# Patient Record
Sex: FEMALE | Race: WHITE | Employment: OTHER | URBAN - METROPOLITAN AREA
[De-identification: names, ages, dates, MRNs, and addresses within clinical notes are randomized per-mention and may not be internally consistent; named-entity substitution may affect disease eponyms.]

---

## 2017-01-01 PROBLEM — D72.819 LEUKOPENIA: Status: ACTIVE | Noted: 2017-01-01

## 2017-01-01 PROBLEM — R55 SYNCOPE: Status: ACTIVE | Noted: 2017-01-01

## 2017-01-02 PROBLEM — D63.8 ANEMIA OF CHRONIC DISEASE: Chronic | Status: ACTIVE | Noted: 2017-01-02

## 2017-01-02 PROBLEM — E78.2 MIXED HYPERLIPIDEMIA: Chronic | Status: ACTIVE | Noted: 2017-01-02

## 2018-04-04 ENCOUNTER — HOSPITAL ENCOUNTER (OUTPATIENT)
Dept: GENERAL RADIOLOGY | Age: 68
Discharge: HOME | End: 2018-04-06
Payer: MEDICARE

## 2018-04-04 ENCOUNTER — HOSPITAL ENCOUNTER (OUTPATIENT)
Dept: NUCLEAR MEDICINE | Age: 68
Discharge: HOME | End: 2018-04-04
Payer: MEDICARE

## 2018-04-04 ENCOUNTER — HOSPITAL ENCOUNTER (OUTPATIENT)
Age: 68
Discharge: HOME | End: 2018-04-06
Payer: MEDICARE

## 2018-04-04 DIAGNOSIS — N20.0 CALCULUS OF KIDNEY: ICD-10-CM

## 2018-04-04 PROCEDURE — 6360000002 HC RX W HCPCS: Performed by: UROLOGY

## 2018-04-04 PROCEDURE — 3430000000 HC RX DIAGNOSTIC RADIOPHARMACEUTICAL: Performed by: RADIOLOGY

## 2018-04-04 PROCEDURE — A9562 TC99M MERTIATIDE: HCPCS | Performed by: RADIOLOGY

## 2018-04-04 PROCEDURE — 78708 K FLOW/FUNCT IMAGE W/DRUG: CPT

## 2018-04-04 PROCEDURE — 74018 RADEX ABDOMEN 1 VIEW: CPT

## 2018-04-04 RX ORDER — FUROSEMIDE 10 MG/ML
10 INJECTION INTRAMUSCULAR; INTRAVENOUS ONCE
Status: COMPLETED | OUTPATIENT
Start: 2018-04-04 | End: 2018-04-04

## 2018-04-04 RX ADMIN — FUROSEMIDE 10 MG: 10 INJECTION, SOLUTION INTRAVENOUS at 12:02

## 2018-04-04 RX ADMIN — Medication 10 MILLICURIE: at 11:16

## 2018-04-14 ENCOUNTER — PREP FOR PROCEDURE (OUTPATIENT)
Dept: UROLOGY | Age: 68
End: 2018-04-14

## 2018-04-14 RX ORDER — SODIUM CHLORIDE 0.9 % (FLUSH) 0.9 %
10 SYRINGE (ML) INJECTION PRN
Status: CANCELLED | OUTPATIENT
Start: 2018-04-14

## 2018-04-14 RX ORDER — SODIUM CHLORIDE, SODIUM LACTATE, POTASSIUM CHLORIDE, CALCIUM CHLORIDE 600; 310; 30; 20 MG/100ML; MG/100ML; MG/100ML; MG/100ML
INJECTION, SOLUTION INTRAVENOUS CONTINUOUS
Status: CANCELLED | OUTPATIENT
Start: 2018-04-14

## 2018-04-14 RX ORDER — SODIUM CHLORIDE 0.9 % (FLUSH) 0.9 %
10 SYRINGE (ML) INJECTION EVERY 12 HOURS SCHEDULED
Status: CANCELLED | OUTPATIENT
Start: 2018-04-14

## 2018-04-19 ENCOUNTER — HOSPITAL ENCOUNTER (OUTPATIENT)
Age: 68
Setting detail: OUTPATIENT SURGERY
Discharge: HOME | End: 2018-04-19
Attending: UROLOGY | Admitting: UROLOGY
Payer: MEDICARE

## 2018-04-19 ENCOUNTER — ANESTHESIA EVENT (OUTPATIENT)
Dept: OPERATING ROOM | Age: 68
End: 2018-04-19
Payer: MEDICARE

## 2018-04-19 ENCOUNTER — ANESTHESIA (OUTPATIENT)
Dept: OPERATING ROOM | Age: 68
End: 2018-04-19
Payer: MEDICARE

## 2018-04-19 ENCOUNTER — APPOINTMENT (OUTPATIENT)
Dept: GENERAL RADIOLOGY | Age: 68
End: 2018-04-19
Attending: UROLOGY
Payer: MEDICARE

## 2018-04-19 VITALS
SYSTOLIC BLOOD PRESSURE: 163 MMHG | WEIGHT: 158 LBS | TEMPERATURE: 98.6 F | DIASTOLIC BLOOD PRESSURE: 72 MMHG | RESPIRATION RATE: 16 BRPM | HEART RATE: 88 BPM | BODY MASS INDEX: 29.83 KG/M2 | HEIGHT: 61 IN | OXYGEN SATURATION: 98 %

## 2018-04-19 VITALS — OXYGEN SATURATION: 99 % | SYSTOLIC BLOOD PRESSURE: 90 MMHG | DIASTOLIC BLOOD PRESSURE: 43 MMHG

## 2018-04-19 DIAGNOSIS — N20.0 KIDNEY STONE: ICD-10-CM

## 2018-04-19 LAB
HCT VFR BLD CALC: 32.6 % (ref 34–48)
HEMOGLOBIN: 11 G/DL (ref 11.5–15.5)
MCH RBC QN AUTO: 32.9 PG (ref 26–35)
MCHC RBC AUTO-ENTMCNC: 33.7 % (ref 32–34.5)
MCV RBC AUTO: 97.6 FL (ref 80–99.9)
PDW BLD-RTO: 14 FL (ref 11.5–15)
PLATELET # BLD: 129 E9/L (ref 130–450)
PMV BLD AUTO: 9.5 FL (ref 7–12)
RBC # BLD: 3.34 E12/L (ref 3.5–5.5)
WBC # BLD: 3.8 E9/L (ref 4.5–11.5)

## 2018-04-19 PROCEDURE — 74420 UROGRAPHY RTRGR +-KUB: CPT

## 2018-04-19 PROCEDURE — 87088 URINE BACTERIA CULTURE: CPT

## 2018-04-19 PROCEDURE — 87077 CULTURE AEROBIC IDENTIFY: CPT

## 2018-04-19 PROCEDURE — 6360000002 HC RX W HCPCS: Performed by: UROLOGY

## 2018-04-19 PROCEDURE — 2720000010 HC SURG SUPPLY STERILE: Performed by: UROLOGY

## 2018-04-19 PROCEDURE — 6360000002 HC RX W HCPCS: Performed by: NURSE ANESTHETIST, CERTIFIED REGISTERED

## 2018-04-19 PROCEDURE — 36415 COLL VENOUS BLD VENIPUNCTURE: CPT

## 2018-04-19 PROCEDURE — 3600000014 HC SURGERY LEVEL 4 ADDTL 15MIN: Performed by: UROLOGY

## 2018-04-19 PROCEDURE — 3700000000 HC ANESTHESIA ATTENDED CARE: Performed by: UROLOGY

## 2018-04-19 PROCEDURE — A9577 INJ MULTIHANCE: HCPCS | Performed by: UROLOGY

## 2018-04-19 PROCEDURE — C2617 STENT, NON-COR, TEM W/O DEL: HCPCS | Performed by: UROLOGY

## 2018-04-19 PROCEDURE — C1894 INTRO/SHEATH, NON-LASER: HCPCS | Performed by: UROLOGY

## 2018-04-19 PROCEDURE — 3700000001 HC ADD 15 MINUTES (ANESTHESIA): Performed by: UROLOGY

## 2018-04-19 PROCEDURE — C1773 RET DEV, INSERTABLE: HCPCS | Performed by: UROLOGY

## 2018-04-19 PROCEDURE — 2580000003 HC RX 258: Performed by: NURSE ANESTHETIST, CERTIFIED REGISTERED

## 2018-04-19 PROCEDURE — 3600000004 HC SURGERY LEVEL 4 BASE: Performed by: UROLOGY

## 2018-04-19 PROCEDURE — 7100000010 HC PHASE II RECOVERY - FIRST 15 MIN: Performed by: UROLOGY

## 2018-04-19 PROCEDURE — C1758 CATHETER, URETERAL: HCPCS | Performed by: UROLOGY

## 2018-04-19 PROCEDURE — 6360000004 HC RX CONTRAST MEDICATION: Performed by: UROLOGY

## 2018-04-19 PROCEDURE — 2580000003 HC RX 258: Performed by: UROLOGY

## 2018-04-19 PROCEDURE — C1769 GUIDE WIRE: HCPCS | Performed by: UROLOGY

## 2018-04-19 PROCEDURE — 87186 SC STD MICRODIL/AGAR DIL: CPT

## 2018-04-19 PROCEDURE — 85027 COMPLETE CBC AUTOMATED: CPT

## 2018-04-19 PROCEDURE — 7100000011 HC PHASE II RECOVERY - ADDTL 15 MIN: Performed by: UROLOGY

## 2018-04-19 DEVICE — STENT URET 6FR L24CM PERCFLX HYDR+ DBL PGTL THRD 2: Type: IMPLANTABLE DEVICE | Status: FUNCTIONAL

## 2018-04-19 RX ORDER — SODIUM CHLORIDE 0.9 % (FLUSH) 0.9 %
10 SYRINGE (ML) INJECTION PRN
Status: DISCONTINUED | OUTPATIENT
Start: 2018-04-19 | End: 2018-04-19 | Stop reason: HOSPADM

## 2018-04-19 RX ORDER — PROPOFOL 10 MG/ML
INJECTION, EMULSION INTRAVENOUS CONTINUOUS PRN
Status: DISCONTINUED | OUTPATIENT
Start: 2018-04-19 | End: 2018-04-19 | Stop reason: SDUPTHER

## 2018-04-19 RX ORDER — SODIUM CHLORIDE 0.9 % (FLUSH) 0.9 %
10 SYRINGE (ML) INJECTION EVERY 12 HOURS SCHEDULED
Status: DISCONTINUED | OUTPATIENT
Start: 2018-04-19 | End: 2018-04-19 | Stop reason: HOSPADM

## 2018-04-19 RX ORDER — DEXAMETHASONE SODIUM PHOSPHATE 4 MG/ML
INJECTION, SOLUTION INTRA-ARTICULAR; INTRALESIONAL; INTRAMUSCULAR; INTRAVENOUS; SOFT TISSUE PRN
Status: DISCONTINUED | OUTPATIENT
Start: 2018-04-19 | End: 2018-04-19 | Stop reason: SDUPTHER

## 2018-04-19 RX ORDER — SODIUM CHLORIDE, SODIUM LACTATE, POTASSIUM CHLORIDE, CALCIUM CHLORIDE 600; 310; 30; 20 MG/100ML; MG/100ML; MG/100ML; MG/100ML
INJECTION, SOLUTION INTRAVENOUS CONTINUOUS
Status: DISCONTINUED | OUTPATIENT
Start: 2018-04-19 | End: 2018-04-19 | Stop reason: HOSPADM

## 2018-04-19 RX ORDER — SODIUM CHLORIDE 9 MG/ML
INJECTION, SOLUTION INTRAVENOUS CONTINUOUS PRN
Status: DISCONTINUED | OUTPATIENT
Start: 2018-04-19 | End: 2018-04-19 | Stop reason: SDUPTHER

## 2018-04-19 RX ORDER — ONDANSETRON 2 MG/ML
INJECTION INTRAMUSCULAR; INTRAVENOUS PRN
Status: DISCONTINUED | OUTPATIENT
Start: 2018-04-19 | End: 2018-04-19 | Stop reason: SDUPTHER

## 2018-04-19 RX ORDER — MIDAZOLAM HYDROCHLORIDE 1 MG/ML
INJECTION INTRAMUSCULAR; INTRAVENOUS PRN
Status: DISCONTINUED | OUTPATIENT
Start: 2018-04-19 | End: 2018-04-19 | Stop reason: SDUPTHER

## 2018-04-19 RX ORDER — FENTANYL CITRATE 50 UG/ML
INJECTION, SOLUTION INTRAMUSCULAR; INTRAVENOUS PRN
Status: DISCONTINUED | OUTPATIENT
Start: 2018-04-19 | End: 2018-04-19 | Stop reason: SDUPTHER

## 2018-04-19 RX ORDER — OXYCODONE HYDROCHLORIDE AND ACETAMINOPHEN 5; 325 MG/1; MG/1
1 TABLET ORAL EVERY 4 HOURS PRN
Status: DISCONTINUED | OUTPATIENT
Start: 2018-04-19 | End: 2018-04-19 | Stop reason: HOSPADM

## 2018-04-19 RX ADMIN — FENTANYL CITRATE 50 MCG: 50 INJECTION, SOLUTION INTRAMUSCULAR; INTRAVENOUS at 08:30

## 2018-04-19 RX ADMIN — FENTANYL CITRATE 50 MCG: 50 INJECTION, SOLUTION INTRAMUSCULAR; INTRAVENOUS at 07:50

## 2018-04-19 RX ADMIN — SODIUM CHLORIDE, POTASSIUM CHLORIDE, SODIUM LACTATE AND CALCIUM CHLORIDE: 600; 310; 30; 20 INJECTION, SOLUTION INTRAVENOUS at 06:19

## 2018-04-19 RX ADMIN — ONDANSETRON 4 MG: 2 INJECTION INTRAMUSCULAR; INTRAVENOUS at 07:50

## 2018-04-19 RX ADMIN — MIDAZOLAM HYDROCHLORIDE 2 MG: 1 INJECTION, SOLUTION INTRAMUSCULAR; INTRAVENOUS at 07:40

## 2018-04-19 RX ADMIN — DEXAMETHASONE SODIUM PHOSPHATE 10 MG: 4 INJECTION, SOLUTION INTRAMUSCULAR; INTRAVENOUS at 07:45

## 2018-04-19 RX ADMIN — FENTANYL CITRATE 50 MCG: 50 INJECTION, SOLUTION INTRAMUSCULAR; INTRAVENOUS at 07:45

## 2018-04-19 RX ADMIN — PROPOFOL 75 MCG/KG/MIN: 10 INJECTION, EMULSION INTRAVENOUS at 07:45

## 2018-04-19 RX ADMIN — CEFAZOLIN SODIUM 2 G: 2 SOLUTION INTRAVENOUS at 07:40

## 2018-04-19 RX ADMIN — MIDAZOLAM HYDROCHLORIDE 2 MG: 1 INJECTION, SOLUTION INTRAMUSCULAR; INTRAVENOUS at 07:43

## 2018-04-19 RX ADMIN — FENTANYL CITRATE 100 MCG: 50 INJECTION, SOLUTION INTRAMUSCULAR; INTRAVENOUS at 08:00

## 2018-04-19 RX ADMIN — SODIUM CHLORIDE: 9 INJECTION, SOLUTION INTRAVENOUS at 07:40

## 2018-04-19 ASSESSMENT — PULMONARY FUNCTION TESTS
PIF_VALUE: 0
PIF_VALUE: 1
PIF_VALUE: 1
PIF_VALUE: 0
PIF_VALUE: 1
PIF_VALUE: 1
PIF_VALUE: 0
PIF_VALUE: 0
PIF_VALUE: 1
PIF_VALUE: 0
PIF_VALUE: 1
PIF_VALUE: 1
PIF_VALUE: 0
PIF_VALUE: 1
PIF_VALUE: 0

## 2018-04-19 ASSESSMENT — PAIN SCALES - GENERAL
PAINLEVEL_OUTOF10: 0

## 2018-04-19 ASSESSMENT — PAIN - FUNCTIONAL ASSESSMENT: PAIN_FUNCTIONAL_ASSESSMENT: 0-10

## 2018-04-21 LAB
ORGANISM: ABNORMAL
URINE CULTURE, ROUTINE: ABNORMAL
URINE CULTURE, ROUTINE: ABNORMAL

## 2018-12-14 ENCOUNTER — HOSPITAL ENCOUNTER (OUTPATIENT)
Age: 68
Discharge: HOME | End: 2018-12-16

## 2018-12-14 PROCEDURE — 87081 CULTURE SCREEN ONLY: CPT

## 2018-12-14 PROCEDURE — 88305 TISSUE EXAM BY PATHOLOGIST: CPT

## 2018-12-17 LAB — CLOTEST: NORMAL

## 2020-02-25 ENCOUNTER — HOSPITAL ENCOUNTER (OUTPATIENT)
Age: 70
Discharge: HOME | End: 2020-02-27
Payer: MEDICARE

## 2020-02-25 PROCEDURE — 87186 SC STD MICRODIL/AGAR DIL: CPT

## 2020-02-25 PROCEDURE — 87077 CULTURE AEROBIC IDENTIFY: CPT

## 2020-02-25 PROCEDURE — 87088 URINE BACTERIA CULTURE: CPT

## 2020-02-28 LAB
ORGANISM: ABNORMAL
URINE CULTURE, ROUTINE: ABNORMAL

## 2021-07-18 ENCOUNTER — APPOINTMENT (OUTPATIENT)
Dept: CT IMAGING | Age: 71
End: 2021-07-18
Payer: MEDICARE

## 2021-07-18 ENCOUNTER — HOSPITAL ENCOUNTER (EMERGENCY)
Age: 71
Discharge: HOME | End: 2021-07-18
Payer: MEDICARE

## 2021-07-18 VITALS
HEART RATE: 78 BPM | DIASTOLIC BLOOD PRESSURE: 78 MMHG | RESPIRATION RATE: 18 BRPM | OXYGEN SATURATION: 98 % | WEIGHT: 158 LBS | TEMPERATURE: 97.1 F | BODY MASS INDEX: 31.85 KG/M2 | HEIGHT: 59 IN | SYSTOLIC BLOOD PRESSURE: 122 MMHG

## 2021-07-18 DIAGNOSIS — M25.451 RIGHT HIP JOINT EFFUSION: ICD-10-CM

## 2021-07-18 DIAGNOSIS — R10.9 RIGHT FLANK PAIN: ICD-10-CM

## 2021-07-18 DIAGNOSIS — M16.11 OSTEOARTHRITIS OF RIGHT HIP, UNSPECIFIED OSTEOARTHRITIS TYPE: ICD-10-CM

## 2021-07-18 DIAGNOSIS — N10 ACUTE PYELONEPHRITIS: Primary | ICD-10-CM

## 2021-07-18 LAB
ALBUMIN SERPL-MCNC: 4 G/DL (ref 3.5–5.2)
ALP BLD-CCNC: 87 U/L (ref 35–104)
ALT SERPL-CCNC: 14 U/L (ref 0–32)
ANION GAP SERPL CALCULATED.3IONS-SCNC: 10 MMOL/L (ref 7–16)
AST SERPL-CCNC: 26 U/L (ref 0–31)
BACTERIA: ABNORMAL /HPF
BASOPHILS ABSOLUTE: 0.03 E9/L (ref 0–0.2)
BASOPHILS RELATIVE PERCENT: 0.9 % (ref 0–2)
BILIRUB SERPL-MCNC: 0.4 MG/DL (ref 0–1.2)
BILIRUBIN DIRECT: 0.2 MG/DL (ref 0–0.3)
BILIRUBIN URINE: NEGATIVE
BILIRUBIN, INDIRECT: 0.2 MG/DL (ref 0–1)
BLOOD, URINE: ABNORMAL
BUN BLDV-MCNC: 40 MG/DL (ref 6–23)
CALCIUM SERPL-MCNC: 9.5 MG/DL (ref 8.6–10.2)
CHLORIDE BLD-SCNC: 103 MMOL/L (ref 98–107)
CLARITY: CLEAR
CO2: 24 MMOL/L (ref 22–29)
COLOR: YELLOW
CREAT SERPL-MCNC: 1 MG/DL (ref 0.5–1)
EOSINOPHILS ABSOLUTE: 0.11 E9/L (ref 0.05–0.5)
EOSINOPHILS RELATIVE PERCENT: 3.2 % (ref 0–6)
GFR AFRICAN AMERICAN: >60
GFR NON-AFRICAN AMERICAN: 55 ML/MIN/1.73
GLUCOSE BLD-MCNC: 125 MG/DL (ref 74–99)
GLUCOSE URINE: NEGATIVE MG/DL
HCT VFR BLD CALC: 31.2 % (ref 34–48)
HEMOGLOBIN: 10.3 G/DL (ref 11.5–15.5)
IMMATURE GRANULOCYTES #: 0.02 E9/L
IMMATURE GRANULOCYTES %: 0.6 % (ref 0–5)
KETONES, URINE: NEGATIVE MG/DL
LACTIC ACID: 1.2 MMOL/L (ref 0.5–2.2)
LEUKOCYTE ESTERASE, URINE: ABNORMAL
LIPASE: 26 U/L (ref 13–60)
LYMPHOCYTES ABSOLUTE: 1.18 E9/L (ref 1.5–4)
LYMPHOCYTES RELATIVE PERCENT: 34.8 % (ref 20–42)
MCH RBC QN AUTO: 33.8 PG (ref 26–35)
MCHC RBC AUTO-ENTMCNC: 33 % (ref 32–34.5)
MCV RBC AUTO: 102.3 FL (ref 80–99.9)
MONOCYTES ABSOLUTE: 0.34 E9/L (ref 0.1–0.95)
MONOCYTES RELATIVE PERCENT: 10 % (ref 2–12)
NEUTROPHILS ABSOLUTE: 1.71 E9/L (ref 1.8–7.3)
NEUTROPHILS RELATIVE PERCENT: 50.5 % (ref 43–80)
NITRITE, URINE: POSITIVE
PDW BLD-RTO: 14.6 FL (ref 11.5–15)
PH UA: 5.5 (ref 5–9)
PLATELET # BLD: 148 E9/L (ref 130–450)
PMV BLD AUTO: 9.1 FL (ref 7–12)
POTASSIUM SERPL-SCNC: 4.9 MMOL/L (ref 3.5–5)
PROTEIN UA: NEGATIVE MG/DL
RBC # BLD: 3.05 E12/L (ref 3.5–5.5)
RBC UA: ABNORMAL /HPF (ref 0–2)
SODIUM BLD-SCNC: 137 MMOL/L (ref 132–146)
SPECIFIC GRAVITY UA: 1.02 (ref 1–1.03)
TOTAL PROTEIN: 6.7 G/DL (ref 6.4–8.3)
UROBILINOGEN, URINE: 0.2 E.U./DL
WBC # BLD: 3.4 E9/L (ref 4.5–11.5)
WBC UA: ABNORMAL /HPF (ref 0–5)

## 2021-07-18 PROCEDURE — 81001 URINALYSIS AUTO W/SCOPE: CPT

## 2021-07-18 PROCEDURE — 87088 URINE BACTERIA CULTURE: CPT

## 2021-07-18 PROCEDURE — 6360000002 HC RX W HCPCS: Performed by: NURSE PRACTITIONER

## 2021-07-18 PROCEDURE — 96374 THER/PROPH/DIAG INJ IV PUSH: CPT

## 2021-07-18 PROCEDURE — 99283 EMERGENCY DEPT VISIT LOW MDM: CPT

## 2021-07-18 PROCEDURE — 2580000003 HC RX 258: Performed by: NURSE PRACTITIONER

## 2021-07-18 PROCEDURE — 74176 CT ABD & PELVIS W/O CONTRAST: CPT

## 2021-07-18 PROCEDURE — 83605 ASSAY OF LACTIC ACID: CPT

## 2021-07-18 PROCEDURE — 36415 COLL VENOUS BLD VENIPUNCTURE: CPT

## 2021-07-18 PROCEDURE — 6370000000 HC RX 637 (ALT 250 FOR IP): Performed by: NURSE PRACTITIONER

## 2021-07-18 PROCEDURE — 96375 TX/PRO/DX INJ NEW DRUG ADDON: CPT

## 2021-07-18 PROCEDURE — 83690 ASSAY OF LIPASE: CPT

## 2021-07-18 PROCEDURE — 85025 COMPLETE CBC W/AUTO DIFF WBC: CPT

## 2021-07-18 PROCEDURE — 80048 BASIC METABOLIC PNL TOTAL CA: CPT

## 2021-07-18 PROCEDURE — 87186 SC STD MICRODIL/AGAR DIL: CPT

## 2021-07-18 PROCEDURE — 80076 HEPATIC FUNCTION PANEL: CPT

## 2021-07-18 RX ORDER — KETOROLAC TROMETHAMINE 30 MG/ML
15 INJECTION, SOLUTION INTRAMUSCULAR; INTRAVENOUS ONCE
Status: COMPLETED | OUTPATIENT
Start: 2021-07-18 | End: 2021-07-18

## 2021-07-18 RX ORDER — SULFAMETHOXAZOLE AND TRIMETHOPRIM 800; 160 MG/1; MG/1
1 TABLET ORAL 2 TIMES DAILY
Qty: 20 TABLET | Refills: 0 | Status: SHIPPED | OUTPATIENT
Start: 2021-07-18 | End: 2021-07-22 | Stop reason: ALTCHOICE

## 2021-07-18 RX ORDER — 0.9 % SODIUM CHLORIDE 0.9 %
1000 INTRAVENOUS SOLUTION INTRAVENOUS ONCE
Status: COMPLETED | OUTPATIENT
Start: 2021-07-18 | End: 2021-07-18

## 2021-07-18 RX ORDER — SULFAMETHOXAZOLE AND TRIMETHOPRIM 800; 160 MG/1; MG/1
1 TABLET ORAL ONCE
Status: COMPLETED | OUTPATIENT
Start: 2021-07-18 | End: 2021-07-18

## 2021-07-18 RX ADMIN — SULFAMETHOXAZOLE AND TRIMETHOPRIM 1 TABLET: 800; 160 TABLET ORAL at 21:53

## 2021-07-18 RX ADMIN — KETOROLAC TROMETHAMINE 15 MG: 30 INJECTION, SOLUTION INTRAMUSCULAR; INTRAVENOUS at 21:18

## 2021-07-18 RX ADMIN — CEFTRIAXONE 1000 MG: 1 INJECTION, POWDER, FOR SOLUTION INTRAMUSCULAR; INTRAVENOUS at 21:19

## 2021-07-18 RX ADMIN — SODIUM CHLORIDE 1000 ML: 9 INJECTION, SOLUTION INTRAVENOUS at 19:47

## 2021-07-18 ASSESSMENT — PAIN SCALES - GENERAL
PAINLEVEL_OUTOF10: 8
PAINLEVEL_OUTOF10: 8

## 2021-07-18 ASSESSMENT — PAIN DESCRIPTION - LOCATION: LOCATION: FLANK

## 2021-07-18 ASSESSMENT — PAIN DESCRIPTION - FREQUENCY: FREQUENCY: CONTINUOUS

## 2021-07-18 ASSESSMENT — PAIN DESCRIPTION - ORIENTATION: ORIENTATION: RIGHT

## 2021-07-18 ASSESSMENT — PAIN DESCRIPTION - ONSET: ONSET: ON-GOING

## 2021-07-18 ASSESSMENT — PAIN DESCRIPTION - PAIN TYPE: TYPE: ACUTE PAIN

## 2021-07-18 ASSESSMENT — PAIN DESCRIPTION - PROGRESSION: CLINICAL_PROGRESSION: NOT CHANGED

## 2021-07-18 ASSESSMENT — PAIN DESCRIPTION - DESCRIPTORS: DESCRIPTORS: ACHING

## 2021-07-18 NOTE — ED PROVIDER NOTES
[facility] [facility]  Department of Emergency Medicine   ED  Encounter Note  Admit Date/RoomTime: 2021  7:17 PM  ED Room: CAROL/CAROL    NAME: Tim Pulido  : 1950  MRN: [id number]     Chief Complaint:  Flank Pain (right sided flank pain for 2 weeks  -[place])    History of Present Illness       Tim Pulido is a 70 y.o. old female who presents to the emergency department by private vehicle with her daughter, for gradual onset sharp pain in the right flank without radiation which began 2 week(s) prior to arrival.  There has been similar episodes in the past and has a history of kidney stones. Since onset the symptoms have been persistent and gradually worsening. Patient reports that when she is driving in the car every bump hurts she denies any rashes, fever, chills, nausea, vomiting she has had some constipation. She denies any bloody or tarry stools. Reports the pain is 8/10. She additionally was recently started on Mobic for right hip pain over the past 2 weeks for arthritis. Patient recently moved to an apartment has been emptying boxes and has been doing more than she normally does. She denies any loss of bladder bowel function denies any rectal numbness. Her urine is cloudy and denies any hematuria. The pain is associated with no additional symptoms and moderate in severity. The pain is aggravated by nothing in particular and relieved by none and nothing. There has been NO chest pain, shortness of breath, fever, chills, sweating, vomiting, anorexia, weight loss, diarrhea, constipation, dark/black stools, blood in stool, urinary frequency, hematuria, urinary urgency, urinary incontinence, vaginal discharge or vaginal itching. ROS   Pertinent positives and negatives are stated within HPI, all other systems reviewed and are negative.     Past Medical History:  has a past medical history of Anemia, Anxiety, Cancer ([address] Depression, Dyslipidemia, Dysrhythmia, High triglycerides, IBS (irritable bowel syndrome), Kidney stones, Muscle spasm, Neck injury, and Syncope and collapse. Surgical History has a past surgical history that includes Neck surgery; Hysterectomy; other surgical history (Left, 12/12/2013); Cholecystectomy (12/23/2013); Tonsillectomy; bone marrow biopsy (09/12/2016); Cataract removal (Bilateral); Colonoscopy; Upper gastrointestinal endoscopy; and pr cysto/uretero/pyeloscopy w/lithotripsy (Left, 4/19/2018). Social History:  reports that she has never smoked. She has never used smokeless tobacco. She reports that she does not drink alcohol and does not use drugs. Family History: family history is not on file. Allergies: Patient has no known allergies. Physical Exam   Oxygen Saturation Interpretation: Normal on room air analysis. ED Triage Vitals   BP Temp Temp src Pulse Resp SpO2 Height Weight   -- -- -- -- -- -- -- --          General Appearance/Constitutional:  Alert, development consistent with age. HEENT:  NC/NT. PERRLA. Airway patent. Neck:  Supple. No lymphadenopathy. Respiratory:  No retractions. Lungs Clear to auscultation and breath sounds equal.  CV:  Regular rate and rhythm. GI:  normal appearing, non-distended with no visible hernias. Bowel sounds: normal bowel sounds. Tenderness: There is moderate tenderness present - located diffusely in the entire abdomen., There is no rebound tenderness. , There is no guarding. There is no distension. , There is no pulsatile mass. .        Liver: non-tender and non-palpable. Spleen:  non-tender and non-palpable. Back: CVA Tenderness: No CVA tenderness. : /Pelvic examination deferred / declined. Integument:  Normal turgor. Warm, dry, without visible rash, unless noted elsewhere. Lymphatics: No edema, cap.refill <3sec. Neurological:  Orientation age-appropriate. Motor functions intact.     Lab / Imaging Results   (All laboratory American 55 >=60 mL/min/1.73    GFR African American >60     Calcium 9.5 8.6 - 10.2 mg/dL   Hepatic function panel   Result Value Ref Range    Total Protein 6.7 6.4 - 8.3 g/dL    Albumin 4.0 3.5 - 5.2 g/dL    Alkaline Phosphatase 87 35 - 104 U/L    ALT 14 0 - 32 U/L    AST 26 0 - 31 U/L    Total Bilirubin 0.4 0.0 - 1.2 mg/dL    Bilirubin, Direct 0.2 0.0 - 0.3 mg/dL    Bilirubin, Indirect 0.2 0.0 - 1.0 mg/dL   Microscopic Urinalysis   Result Value Ref Range    WBC, UA 10-20 (A) 0 - 5 /HPF    RBC, UA NONE 0 - 2 /HPF    Bacteria, UA MODERATE (A) None Seen /HPF     Imaging: All Radiology results interpreted by Radiologist unless otherwise noted. CT ABDOMEN PELVIS WO CONTRAST Additional Contrast? None   Final Result   No acute specific abdominopelvic process is identified to explain right flank   pain. No obstructive uropathy. Punctate non-obstructing right nephrolith. Moderate right hip joint effusion, not present in 2018. Bilateral hip joint DJD, without evidence of acute fracture. Postsurgical and other chronic/nonemergent findings, as described. ED Course / Medical Decision Making     Medications   0.9 % sodium chloride bolus (0 mLs Intravenous Stopped 7/18/21 2017)   cefTRIAXone (ROCEPHIN) 1,000 mg in sterile water 10 mL IV syringe (0 mg Intravenous Stopped 7/18/21 2123)   ketorolac (TORADOL) injection 15 mg (15 mg Intravenous Given 7/18/21 2118)   sulfamethoxazole-trimethoprim (BACTRIM DS;SEPTRA DS) 800-160 MG per tablet 1 tablet (1 tablet Oral Given 7/18/21 2153)      7/18/21      Time: 2105 Patients condition is improving after treatment. 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  Patient prefers being consulted with Dr. Opal Patel because her family members go to him.     Consultations:             None    Procedures:   none    MDM: This is a 70-year-old female who arrives today with complaints of right flank pain and will obtain labs, give IV fluids and obtain CT of the abdomen to rule out obstructive uropathy. Urinalysis reveals UTI with moderate bacteria, positive nitrates, 10-20 WBCs will submit culture. BUN 40 creatinine 1.0. WBCs 3.4 Hgb is 10.3. Patient was given Rocephin in the ED for suspected pyelonephritis. CT of the abdomen reveals no acute specific abdominal pelvic process to explain the right flank pain there is a nonobstructing right kidney stone. Moderate right hip joint effusion is new since 2018 she has bilateral hip degenerative joints without any acute fractures. Patient has no signs of acute septic joint. She is requesting to see Dr. Daniel Esquivel and will prescribe a cane for ambulation. Patient has asymmetric atrophy of the left kidney compared to the right. Patient will be given urology for follow-up as well as orthopedic. Previous urine cultures reviewed with Dr. Mellisa Novak and responded to Bactrim and will give first dose in the ED and send her home with a prescription for Bactrim. Patient is afebrile, nontoxic in appearance, hemodynamically stable for discharge and given specific signs and symptoms to her and her daughter for reevaluation at any time. Patient will also follow-up with her PCP. Plan of Care/Counseling:  BETTY Brooks CNP reviewed today's visit with the patient in addition to providing specific details for the plan of care and counseling regarding the diagnosis and prognosis. Questions are answered at this time and are agreeable with the plan. Assessment      1. Acute pyelonephritis    2. Right flank pain    3. Right hip joint effusion    4.  Osteoarthritis of right hip, unspecified osteoarthritis type      This patient's ED course included: a personal history and physicial examination, re-evaluation prior to disposition, multiple bedside re-evaluations, IV medications and complex medical decision making and emergency management  This patient has remained hemodynamically stable, improved and been closely monitored during their ED course. Plan   Discharged home  Patient condition is good. New Medications     Discharge Medication List as of 7/18/2021  9:47 PM      START taking these medications    Details   sulfamethoxazole-trimethoprim (BACTRIM DS) 800-160 MG per tablet Take 1 tablet by mouth 2 times daily for 10 days, Disp-20 tablet, R-0Normal           Electronically signed by BETTY Rose CNP   DD: 7/18/21  **This report was transcribed using voice recognition software. Every effort was made to ensure accuracy; however, inadvertent computerized transcription errors may be present.   END OF PROVIDER NOTE     BETTY Rose CNP  07/20/21 0107

## 2021-07-21 LAB
ORGANISM: ABNORMAL
URINE CULTURE, ROUTINE: ABNORMAL

## 2021-07-22 RX ORDER — CEFDINIR 300 MG/1
300 CAPSULE ORAL 2 TIMES DAILY
Qty: 14 CAPSULE | Refills: 0 | Status: SHIPPED | OUTPATIENT
Start: 2021-07-22 | End: 2021-07-29

## 2021-09-22 ENCOUNTER — HOSPITAL ENCOUNTER (OUTPATIENT)
Dept: CT IMAGING | Age: 71
Discharge: HOME | End: 2021-09-24
Payer: MEDICARE

## 2021-09-22 DIAGNOSIS — M16.11 ARTHRITIS OF RIGHT HIP: ICD-10-CM

## 2021-09-22 PROCEDURE — 73700 CT LOWER EXTREMITY W/O DYE: CPT

## 2021-10-04 ENCOUNTER — APPOINTMENT (OUTPATIENT)
Dept: CT IMAGING | Age: 71
DRG: 812 | End: 2021-10-04
Payer: MEDICARE

## 2021-10-04 ENCOUNTER — HOSPITAL ENCOUNTER (INPATIENT)
Age: 71
LOS: 2 days | Discharge: HOME HEALTH | DRG: 812 | End: 2021-10-07
Attending: STUDENT IN AN ORGANIZED HEALTH CARE EDUCATION/TRAINING PROGRAM | Admitting: INTERNAL MEDICINE
Payer: MEDICARE

## 2021-10-04 ENCOUNTER — APPOINTMENT (OUTPATIENT)
Dept: GENERAL RADIOLOGY | Age: 71
DRG: 812 | End: 2021-10-04
Payer: MEDICARE

## 2021-10-04 DIAGNOSIS — R55 SYNCOPE AND COLLAPSE: Primary | ICD-10-CM

## 2021-10-04 DIAGNOSIS — D64.9 ANEMIA, UNSPECIFIED TYPE: ICD-10-CM

## 2021-10-04 LAB
ALBUMIN SERPL-MCNC: 3.1 G/DL (ref 3.5–5.2)
ALP BLD-CCNC: 75 U/L (ref 35–104)
ALT SERPL-CCNC: 15 U/L (ref 0–32)
ANION GAP SERPL CALCULATED.3IONS-SCNC: 7 MMOL/L (ref 7–16)
AST SERPL-CCNC: 41 U/L (ref 0–31)
BASOPHILS ABSOLUTE: 0.02 E9/L (ref 0–0.2)
BASOPHILS RELATIVE PERCENT: 1 % (ref 0–2)
BILIRUB SERPL-MCNC: 0.4 MG/DL (ref 0–1.2)
BUN BLDV-MCNC: 21 MG/DL (ref 6–23)
CALCIUM SERPL-MCNC: 8.3 MG/DL (ref 8.6–10.2)
CHLORIDE BLD-SCNC: 106 MMOL/L (ref 98–107)
CO2: 24 MMOL/L (ref 22–29)
CREAT SERPL-MCNC: 0.9 MG/DL (ref 0.5–1)
D DIMER: 1978 NG/ML DDU
EOSINOPHILS ABSOLUTE: 0.02 E9/L (ref 0.05–0.5)
EOSINOPHILS RELATIVE PERCENT: 1 % (ref 0–6)
GFR AFRICAN AMERICAN: >60
GFR NON-AFRICAN AMERICAN: >60 ML/MIN/1.73
GLUCOSE BLD-MCNC: 115 MG/DL (ref 74–99)
HCT VFR BLD CALC: 22.7 % (ref 34–48)
HEMOGLOBIN: 7.3 G/DL (ref 11.5–15.5)
LYMPHOCYTES ABSOLUTE: 0.25 E9/L (ref 1.5–4)
LYMPHOCYTES RELATIVE PERCENT: 11 % (ref 20–42)
MCH RBC QN AUTO: 34.1 PG (ref 26–35)
MCHC RBC AUTO-ENTMCNC: 32.2 % (ref 32–34.5)
MCV RBC AUTO: 106.1 FL (ref 80–99.9)
MONOCYTES ABSOLUTE: 0.09 E9/L (ref 0.1–0.95)
MONOCYTES RELATIVE PERCENT: 4 % (ref 2–12)
NEUTROPHILS ABSOLUTE: 1.91 E9/L (ref 1.8–7.3)
NEUTROPHILS RELATIVE PERCENT: 83 % (ref 43–80)
OVALOCYTES: ABNORMAL
PDW BLD-RTO: 13.7 FL (ref 11.5–15)
PLATELET # BLD: 86 E9/L (ref 130–450)
PLATELET CONFIRMATION: NORMAL
PMV BLD AUTO: 9.6 FL (ref 7–12)
POTASSIUM REFLEX MAGNESIUM: 5 MMOL/L (ref 3.5–5)
PRO-BNP: 188 PG/ML (ref 0–125)
RBC # BLD: 2.14 E12/L (ref 3.5–5.5)
SODIUM BLD-SCNC: 137 MMOL/L (ref 132–146)
TOTAL PROTEIN: 5.1 G/DL (ref 6.4–8.3)
TROPONIN, HIGH SENSITIVITY: 34 NG/L (ref 0–9)
WBC # BLD: 2.3 E9/L (ref 4.5–11.5)

## 2021-10-04 PROCEDURE — 99285 EMERGENCY DEPT VISIT HI MDM: CPT

## 2021-10-04 PROCEDURE — 71275 CT ANGIOGRAPHY CHEST: CPT

## 2021-10-04 PROCEDURE — 80053 COMPREHEN METABOLIC PANEL: CPT

## 2021-10-04 PROCEDURE — 84484 ASSAY OF TROPONIN QUANT: CPT

## 2021-10-04 PROCEDURE — 6360000004 HC RX CONTRAST MEDICATION: Performed by: RADIOLOGY

## 2021-10-04 PROCEDURE — 85378 FIBRIN DEGRADE SEMIQUANT: CPT

## 2021-10-04 PROCEDURE — 36415 COLL VENOUS BLD VENIPUNCTURE: CPT

## 2021-10-04 PROCEDURE — 85025 COMPLETE CBC W/AUTO DIFF WBC: CPT

## 2021-10-04 PROCEDURE — 71045 X-RAY EXAM CHEST 1 VIEW: CPT

## 2021-10-04 PROCEDURE — 83880 ASSAY OF NATRIURETIC PEPTIDE: CPT

## 2021-10-04 RX ORDER — 0.9 % SODIUM CHLORIDE 0.9 %
1000 INTRAVENOUS SOLUTION INTRAVENOUS ONCE
Status: COMPLETED | OUTPATIENT
Start: 2021-10-04 | End: 2021-10-05

## 2021-10-04 RX ADMIN — IOPAMIDOL 75 ML: 755 INJECTION, SOLUTION INTRAVENOUS at 22:12

## 2021-10-04 ASSESSMENT — ENCOUNTER SYMPTOMS
WHEEZING: 0
EYE DISCHARGE: 0
DIARRHEA: 0
VOMITING: 0
BACK PAIN: 0
COUGH: 0
SORE THROAT: 0
SHORTNESS OF BREATH: 0
ABDOMINAL DISTENTION: 0
NAUSEA: 0
SINUS PRESSURE: 0
EYE PAIN: 0
EYE REDNESS: 0

## 2021-10-04 ASSESSMENT — PAIN DESCRIPTION - LOCATION: LOCATION: HIP

## 2021-10-04 ASSESSMENT — PAIN DESCRIPTION - ORIENTATION: ORIENTATION: RIGHT

## 2021-10-04 NOTE — ED PROVIDER NOTES
Navin Kramer is a 70 y.o. female with a PMHx significant for fibromyalgia, HLD, anemia of chronic disease who presents for evaluation of syncopal episode, beginning prior to arrival.  The complaint has been intermittent, moderate in severity, and worsened by movement and exertion. .   The patient states that she had a total hip replacement on the right by Dr. Chilo Austin today. She went in around 0800 and it was supposed to last one hour. She notes around 1400 she got up to go to the bathroom, she started to feel light headed and was hypotensive. They started her on fluids and observed her for a little bit. She felt better, was able to ambulate and went home. Patient notes that at home she went to get up stairs where she started to feel weak, fell on her legs and passed out. She did not hit her head, and was found to be hypotensive. The history is provided by the patient and medical records. Review of Systems   Constitutional: Negative for chills and fever. HENT: Negative for ear pain, sinus pressure and sore throat. Eyes: Negative for pain, discharge and redness. Respiratory: Negative for cough, shortness of breath and wheezing. Cardiovascular: Negative for chest pain. Gastrointestinal: Negative for abdominal distention, diarrhea, nausea and vomiting. Genitourinary: Negative for dysuria and frequency. Musculoskeletal: Negative for arthralgias and back pain. Skin: Negative for rash and wound. Neurological: Positive for syncope and light-headedness. Negative for weakness and headaches. Hematological: Negative for adenopathy. All other systems reviewed and are negative. Physical Exam  Vitals and nursing note reviewed. Constitutional:       General: She is not in acute distress. Appearance: Normal appearance. She is well-developed. She is not ill-appearing. HENT:      Head: Normocephalic and atraumatic.       Right Ear: External ear normal.      Left Ear: External ear normal.   Eyes:      General:         Right eye: No discharge. Left eye: No discharge. Conjunctiva/sclera: Conjunctivae normal.   Cardiovascular:      Rate and Rhythm: Normal rate and regular rhythm. Heart sounds: Normal heart sounds. No murmur heard. Pulmonary:      Effort: Pulmonary effort is normal. No respiratory distress. Breath sounds: Normal breath sounds. No stridor. Abdominal:      General: There is no distension. Palpations: Abdomen is soft. There is no mass. Tenderness: There is no abdominal tenderness. Hernia: No hernia is present. Musculoskeletal:      Cervical back: Normal range of motion and neck supple. Skin:     General: Skin is warm and dry. Coloration: Skin is not jaundiced or pale. Neurological:      General: No focal deficit present. Mental Status: She is alert and oriented to person, place, and time. Cranial Nerves: No cranial nerve deficit. Coordination: Coordination normal.          Procedures     MDM     ED Course as of Oct 07 1411   Mon Oct 04, 2021   2141 Patient declining rectal exam at this time. [BB]   2259 Patient signed out to Dr. Taylor Aleman pending CTA of chest.    [BB]   Tue Oct 05, 2021   0025 EKG interpreted by emergency physicianEKG shows normal sinus rhythm 85 bpm.  Normal axis. Normal QRS. Nonspecific ST-T wave changes noted. No STEMI.    [CB]   0129 Assumed care of patient from previous provider. EKG showed no ischemic findings. Repeat troponin was 34 with a negative delta. Patient CTA showed no PE. Patient does have significant anemia likely related to recent procedure. Due to the patient's syncope and dizziness and inability to ambulate the patient be admitted.   Case discussed with Dorothy who is on-call for [facility] who is excepted the patient for admission.    [CB]      ED Course User Index  [BB] Gordo Dias DO  [CB] Poli Cabrera, DO Gumaro Dcik presents to the ED for Eosinophils Absolute 0.02 (L) 0.05 - 0.50 E9/L    Basophils Absolute 0.01 0.00 - 0.20 E9/L    Polychromasia 1+     Poikilocytes 1+     Ovalocytes 1+     Basophilic Stippling 1+    Platelet Confirmation   Result Value Ref Range    Platelet Confirmation CONFIRMED    Hemoglobin and hematocrit, blood   Result Value Ref Range    Hemoglobin 7.2 (L) 11.5 - 15.5 g/dL    Hematocrit 22.9 (L) 34.0 - 48.0 %   CBC WITH AUTO DIFFERENTIAL   Result Value Ref Range    WBC 1.9 (L) 4.5 - 11.5 E9/L    RBC 2.53 (L) 3.50 - 5.50 E12/L    Hemoglobin 8.3 (L) 11.5 - 15.5 g/dL    Hematocrit 26.7 (L) 34.0 - 48.0 %    .5 (H) 80.0 - 99.9 fL    MCH 32.8 26.0 - 35.0 pg    MCHC 31.1 (L) 32.0 - 34.5 %    RDW 14.7 11.5 - 15.0 fL    Platelets 83 (L) 652 - 450 E9/L    MPV 9.4 7.0 - 12.0 fL    Neutrophils % 67.5 43.0 - 80.0 %    Lymphocytes % 25.4 20.0 - 42.0 %    Monocytes % 3.5 2.0 - 12.0 %    Eosinophils % 1.8 0.0 - 6.0 %    Basophils % 1.8 0.0 - 2.0 %    Neutrophils Absolute 1.29 (L) 1.80 - 7.30 E9/L    Lymphocytes Absolute 0.48 (L) 1.50 - 4.00 E9/L    Monocytes Absolute 0.08 (L) 0.10 - 0.95 E9/L    Eosinophils Absolute 0.03 (L) 0.05 - 0.50 E9/L    Basophils Absolute 0.03 0.00 - 0.20 E9/L    nRBC 0.0 /100 WBC    Anisocytosis 1+     Poikilocytes 1+     Ovalocytes 1+    Platelet Confirmation   Result Value Ref Range    Platelet Confirmation CONFIRMED    EKG 12 Lead   Result Value Ref Range    Ventricular Rate 85 BPM    Atrial Rate 85 BPM    P-R Interval 146 ms    QRS Duration 92 ms    Q-T Interval 366 ms    QTc Calculation (Bazett) 435 ms    P Axis 29 degrees    R Axis -25 degrees    T Axis 47 degrees   TYPE AND SCREEN   Result Value Ref Range    ABO/Rh CANCELED     Antibody Screen NEG    PREPARE RBC (CROSSMATCH), 1 Units   Result Value Ref Range    Product Code Blood Bank [id number]     Description Blood Bank Red Blood Cells, Apheresis, Leuko-reduced     Unit Number [id number]     Dispense Status Blood Bank transfused    ABORH TUBE   Result Value Ref Range    ABO/Rh O POS    PREPARE RBC (CROSSMATCH)   Result Value Ref Range    Product Code Blood Bank [id number]     Description Blood Bank Red Blood Cells, Leuko-reduced     Unit Number [id number]     Dispense Status Blood Bank transfused        RADIOLOGY:  US CAROTID ARTERY BILATERAL   Final Result   The right internal carotid artery demonstrates 0-50% stenosis. The left internal carotid artery demonstrates 50-69% stenosis, at the bulb. Bilateral vertebral arteries are patent with flow in the normal direction. CTA PULMONARY W CONTRAST   Final Result   No evidence of acute pulmonary embolism. No aneurysm formation or dissection of the thoracic aorta. No acute pulmonary process. .         XR CHEST PORTABLE   Final Result   No acute process. ------------------------- NURSING NOTES AND VITALS REVIEWED ---------------------------  Date / Time Roomed:  10/4/2021  6:21 PM  ED Bed Assignment:  [id number]    The nursing notes within the ED encounter and vital signs as below have been reviewed.      Patient Vitals for the past 24 hrs:   BP Temp Temp src Pulse Resp SpO2 Weight   10/07/21 0959 (!) 112/55 -- -- 91 -- -- --   10/07/21 0954 (!) 141/54 -- -- 79 -- 100 % --   10/07/21 0953 -- 99.5 °F (37.5 °C) Oral 78 20 99 % --   10/07/21 0111 -- -- -- -- -- -- 159 lb 3.2 oz (72.2 kg)   10/07/21 0005 -- 98.2 °F (36.8 °C) Oral -- -- -- --   10/06/21 2145 -- 100.1 °F (37.8 °C) Oral -- -- -- --   10/06/21 1948 (!) 144/52 100.4 °F (38 °C) Oral 91 16 100 % --   10/06/21 1715 (!) 161/56 99.8 °F (37.7 °C) Oral 92 18 99 % --   10/06/21 1643 (!) 158/74 99.6 °F (37.6 °C) Oral 93 18 -- --       Oxygen Saturation Interpretation: Normal    ------------------------------------------ PROGRESS NOTES ------------------------------------------  Counseling:  I have spoken with the patient and discussed todays results, in addition to providing specific details for the plan of care and counseling regarding the diagnosis and prognosis. Their questions are answered at this time and they are agreeable with the plan of admission.    --------------------------------- ADDITIONAL PROVIDER NOTES ---------------------------------  Consultations:  Spoke with Ari Hernandez on call for Dr. Andrews Farrell. Discussed case. They will admit the patient. This patient's ED course included: a personal history and physicial examination, re-evaluation prior to disposition, multiple bedside re-evaluations, IV medications, cardiac monitoring, continuous pulse oximetry and complex medical decision making and emergency management    This patient has remained hemodynamically stable during their ED course. Diagnosis:  1. Syncope and collapse    2. Anemia, unspecified type        Disposition:  Patient's disposition: Admit to med/surg floor  Patient's condition is stable.          Shaniqua Hernandez DO  10/08/21 1215

## 2021-10-04 NOTE — ED NOTES
Patient stated she had her right hip replaced at the orthopedic center, and was discharged from facility yesterday. Patient stated she had a syncopal episode at the facility, and once she returned home to her apartment. Patient denies hitting head, denies thinner, and states she remembers the entire thing. Patient states we she stands she gets very dizzy.      Nathen Mac RN  10/04/21 1918

## 2021-10-05 ENCOUNTER — APPOINTMENT (OUTPATIENT)
Dept: ULTRASOUND IMAGING | Age: 71
DRG: 812 | End: 2021-10-05
Payer: MEDICARE

## 2021-10-05 PROBLEM — R55 SYNCOPE AND COLLAPSE: Status: ACTIVE | Noted: 2021-10-05

## 2021-10-05 LAB
ABO/RH: NORMAL
ABO/RH: NORMAL
ANTIBODY SCREEN: NORMAL
BACTERIA: NORMAL /HPF
BILIRUBIN URINE: NEGATIVE
BLOOD BANK DISPENSE STATUS: NORMAL
BLOOD BANK PRODUCT CODE: NORMAL
BLOOD, URINE: NEGATIVE
BPU ID: NORMAL
CLARITY: CLEAR
COLOR: YELLOW
DESCRIPTION BLOOD BANK: NORMAL
EKG ATRIAL RATE: 85 BPM
EKG P AXIS: 29 DEGREES
EKG P-R INTERVAL: 146 MS
EKG Q-T INTERVAL: 366 MS
EKG QRS DURATION: 92 MS
EKG QTC CALCULATION (BAZETT): 435 MS
EKG R AXIS: -25 DEGREES
EKG T AXIS: 47 DEGREES
EKG VENTRICULAR RATE: 85 BPM
GLUCOSE URINE: NEGATIVE MG/DL
HCT VFR BLD CALC: 20.9 % (ref 34–48)
HEMOGLOBIN: 6.5 G/DL (ref 11.5–15.5)
KETONES, URINE: NEGATIVE MG/DL
LEUKOCYTE ESTERASE, URINE: ABNORMAL
NITRITE, URINE: NEGATIVE
PH UA: 6 (ref 5–9)
PROTEIN UA: NEGATIVE MG/DL
RBC UA: NORMAL /HPF (ref 0–2)
SPECIFIC GRAVITY UA: 1.01 (ref 1–1.03)
TROPONIN, HIGH SENSITIVITY: 34 NG/L (ref 0–9)
UROBILINOGEN, URINE: 0.2 E.U./DL
WBC UA: NORMAL /HPF (ref 0–5)

## 2021-10-05 PROCEDURE — G0378 HOSPITAL OBSERVATION PER HR: HCPCS

## 2021-10-05 PROCEDURE — 84484 ASSAY OF TROPONIN QUANT: CPT

## 2021-10-05 PROCEDURE — 2580000003 HC RX 258: Performed by: PHYSICIAN ASSISTANT

## 2021-10-05 PROCEDURE — 6370000000 HC RX 637 (ALT 250 FOR IP): Performed by: PHYSICIAN ASSISTANT

## 2021-10-05 PROCEDURE — 93005 ELECTROCARDIOGRAM TRACING: CPT | Performed by: STUDENT IN AN ORGANIZED HEALTH CARE EDUCATION/TRAINING PROGRAM

## 2021-10-05 PROCEDURE — 86900 BLOOD TYPING SEROLOGIC ABO: CPT

## 2021-10-05 PROCEDURE — 86923 COMPATIBILITY TEST ELECTRIC: CPT

## 2021-10-05 PROCEDURE — 93010 ELECTROCARDIOGRAM REPORT: CPT | Performed by: INTERNAL MEDICINE

## 2021-10-05 PROCEDURE — 96361 HYDRATE IV INFUSION ADD-ON: CPT

## 2021-10-05 PROCEDURE — 96360 HYDRATION IV INFUSION INIT: CPT

## 2021-10-05 PROCEDURE — 81001 URINALYSIS AUTO W/SCOPE: CPT

## 2021-10-05 PROCEDURE — 87088 URINE BACTERIA CULTURE: CPT

## 2021-10-05 PROCEDURE — 85014 HEMATOCRIT: CPT

## 2021-10-05 PROCEDURE — 36415 COLL VENOUS BLD VENIPUNCTURE: CPT

## 2021-10-05 PROCEDURE — 96372 THER/PROPH/DIAG INJ SC/IM: CPT

## 2021-10-05 PROCEDURE — 86901 BLOOD TYPING SEROLOGIC RH(D): CPT

## 2021-10-05 PROCEDURE — 2060000000 HC ICU INTERMEDIATE R&B

## 2021-10-05 PROCEDURE — P9016 RBC LEUKOCYTES REDUCED: HCPCS

## 2021-10-05 PROCEDURE — 86850 RBC ANTIBODY SCREEN: CPT

## 2021-10-05 PROCEDURE — 85018 HEMOGLOBIN: CPT

## 2021-10-05 PROCEDURE — 6360000002 HC RX W HCPCS: Performed by: PHYSICIAN ASSISTANT

## 2021-10-05 PROCEDURE — 36430 TRANSFUSION BLD/BLD COMPNT: CPT

## 2021-10-05 PROCEDURE — 2580000003 HC RX 258: Performed by: EMERGENCY MEDICINE

## 2021-10-05 PROCEDURE — 93880 EXTRACRANIAL BILAT STUDY: CPT

## 2021-10-05 RX ORDER — POTASSIUM CHLORIDE 7.45 MG/ML
10 INJECTION INTRAVENOUS PRN
Status: DISCONTINUED | OUTPATIENT
Start: 2021-10-05 | End: 2021-10-07 | Stop reason: HOSPADM

## 2021-10-05 RX ORDER — ASPIRIN 81 MG/1
81 TABLET ORAL DAILY
Status: DISCONTINUED | OUTPATIENT
Start: 2021-10-05 | End: 2021-10-06

## 2021-10-05 RX ORDER — ALPRAZOLAM 0.25 MG/1
0.5 TABLET ORAL 4 TIMES DAILY PRN
Status: DISCONTINUED | OUTPATIENT
Start: 2021-10-05 | End: 2021-10-07 | Stop reason: HOSPADM

## 2021-10-05 RX ORDER — SODIUM CHLORIDE 0.9 % (FLUSH) 0.9 %
10 SYRINGE (ML) INJECTION PRN
Status: DISCONTINUED | OUTPATIENT
Start: 2021-10-05 | End: 2021-10-07 | Stop reason: HOSPADM

## 2021-10-05 RX ORDER — SODIUM CHLORIDE 9 MG/ML
INJECTION, SOLUTION INTRAVENOUS PRN
Status: DISCONTINUED | OUTPATIENT
Start: 2021-10-05 | End: 2021-10-07 | Stop reason: HOSPADM

## 2021-10-05 RX ORDER — PALIPERIDONE 3 MG/1
3 TABLET, EXTENDED RELEASE ORAL EVERY MORNING
Status: DISCONTINUED | OUTPATIENT
Start: 2021-10-05 | End: 2021-10-07 | Stop reason: HOSPADM

## 2021-10-05 RX ORDER — TRAMADOL HYDROCHLORIDE 50 MG/1
50 TABLET ORAL EVERY 6 HOURS PRN
Status: DISCONTINUED | OUTPATIENT
Start: 2021-10-05 | End: 2021-10-07 | Stop reason: HOSPADM

## 2021-10-05 RX ORDER — ACETAMINOPHEN 650 MG/1
650 SUPPOSITORY RECTAL EVERY 6 HOURS PRN
Status: DISCONTINUED | OUTPATIENT
Start: 2021-10-05 | End: 2021-10-07 | Stop reason: HOSPADM

## 2021-10-05 RX ORDER — POTASSIUM CHLORIDE 20 MEQ/1
40 TABLET, EXTENDED RELEASE ORAL PRN
Status: DISCONTINUED | OUTPATIENT
Start: 2021-10-05 | End: 2021-10-07 | Stop reason: HOSPADM

## 2021-10-05 RX ORDER — ACETAMINOPHEN 325 MG/1
650 TABLET ORAL EVERY 6 HOURS PRN
Status: DISCONTINUED | OUTPATIENT
Start: 2021-10-05 | End: 2021-10-07 | Stop reason: HOSPADM

## 2021-10-05 RX ORDER — MULTIVIT-MINERALS/FOLIC ACID 150 MCG
2 TABLET,CHEWABLE ORAL DAILY
COMMUNITY

## 2021-10-05 RX ORDER — TRAZODONE HYDROCHLORIDE 50 MG/1
100 TABLET ORAL NIGHTLY
Status: DISCONTINUED | OUTPATIENT
Start: 2021-10-05 | End: 2021-10-07 | Stop reason: HOSPADM

## 2021-10-05 RX ORDER — SODIUM CHLORIDE 9 MG/ML
25 INJECTION, SOLUTION INTRAVENOUS PRN
Status: DISCONTINUED | OUTPATIENT
Start: 2021-10-05 | End: 2021-10-07 | Stop reason: HOSPADM

## 2021-10-05 RX ORDER — SODIUM CHLORIDE 0.9 % (FLUSH) 0.9 %
10 SYRINGE (ML) INJECTION EVERY 12 HOURS SCHEDULED
Status: DISCONTINUED | OUTPATIENT
Start: 2021-10-05 | End: 2021-10-07 | Stop reason: HOSPADM

## 2021-10-05 RX ORDER — SODIUM CHLORIDE 9 MG/ML
INJECTION, SOLUTION INTRAVENOUS CONTINUOUS
Status: DISCONTINUED | OUTPATIENT
Start: 2021-10-05 | End: 2021-10-07

## 2021-10-05 RX ADMIN — VORTIOXETINE 20 MG: 10 TABLET, FILM COATED ORAL at 14:38

## 2021-10-05 RX ADMIN — SODIUM CHLORIDE 1000 ML: 9 INJECTION, SOLUTION INTRAVENOUS at 00:51

## 2021-10-05 RX ADMIN — ALPRAZOLAM 0.5 MG: 0.25 TABLET ORAL at 18:31

## 2021-10-05 RX ADMIN — PALIPERIDONE 3 MG: 3 TABLET, EXTENDED RELEASE ORAL at 14:38

## 2021-10-05 RX ADMIN — TRAMADOL HYDROCHLORIDE 50 MG: 50 TABLET, COATED ORAL at 14:41

## 2021-10-05 RX ADMIN — ASPIRIN 81 MG: 81 TABLET, COATED ORAL at 08:35

## 2021-10-05 RX ADMIN — SODIUM CHLORIDE: 9 INJECTION, SOLUTION INTRAVENOUS at 08:43

## 2021-10-05 RX ADMIN — SODIUM CHLORIDE, PRESERVATIVE FREE 10 ML: 5 INJECTION INTRAVENOUS at 08:51

## 2021-10-05 RX ADMIN — TRAMADOL HYDROCHLORIDE 50 MG: 50 TABLET, COATED ORAL at 04:13

## 2021-10-05 RX ADMIN — ENOXAPARIN SODIUM 40 MG: 40 INJECTION SUBCUTANEOUS at 08:35

## 2021-10-05 RX ADMIN — ALPRAZOLAM 0.5 MG: 0.25 TABLET ORAL at 04:13

## 2021-10-05 ASSESSMENT — PAIN DESCRIPTION - ONSET: ONSET: ON-GOING

## 2021-10-05 ASSESSMENT — PAIN DESCRIPTION - ORIENTATION
ORIENTATION: RIGHT
ORIENTATION: RIGHT

## 2021-10-05 ASSESSMENT — PAIN DESCRIPTION - DESCRIPTORS: DESCRIPTORS: ACHING;CONSTANT;DISCOMFORT

## 2021-10-05 ASSESSMENT — PAIN DESCRIPTION - PROGRESSION: CLINICAL_PROGRESSION: NOT CHANGED

## 2021-10-05 ASSESSMENT — PAIN - FUNCTIONAL ASSESSMENT: PAIN_FUNCTIONAL_ASSESSMENT: PREVENTS OR INTERFERES WITH MANY ACTIVE NOT PASSIVE ACTIVITIES

## 2021-10-05 ASSESSMENT — PAIN SCALES - GENERAL
PAINLEVEL_OUTOF10: 5
PAINLEVEL_OUTOF10: 7
PAINLEVEL_OUTOF10: 7
PAINLEVEL_OUTOF10: 3

## 2021-10-05 ASSESSMENT — PAIN DESCRIPTION - PAIN TYPE: TYPE: ACUTE PAIN;SURGICAL PAIN

## 2021-10-05 ASSESSMENT — PAIN DESCRIPTION - FREQUENCY: FREQUENCY: CONTINUOUS

## 2021-10-05 ASSESSMENT — PAIN DESCRIPTION - LOCATION
LOCATION: HIP
LOCATION: HIP

## 2021-10-05 ASSESSMENT — PAIN DESCRIPTION - DIRECTION: RADIATING_TOWARDS: NON-RADIATING

## 2021-10-05 NOTE — ED NOTES
Transport here to take patient to [address] [place] [address],[address]. Informed them blood was just started and patient could not leave at this time.      Kenji Eldridge RN  10/05/21 4010

## 2021-10-05 NOTE — ED NOTES
Critical call from lab - Hgb 6.5 communication made provider Noel Fisher.  No verbal orders received            Kiet Tariq RN  10/05/21 7183

## 2021-10-05 NOTE — ED NOTES
Orthostatics completed laying and sitting. Pt stated unable to stand d/t recent hip surgery.       Heike Santoro RN  10/04/21 6038

## 2021-10-05 NOTE — H&P
[address] [facility]  History and Physical      CHIEF COMPLAINT:    Chief Complaint   Patient presents with    Loss of Consciousness     right hip replacement today, had 2 syncopal episodes after procedure        Patient of Leela Babcock  presents with:  Syncope    History of Present Illness:    Patient is a 70year old with past medical history of anxiety, depression, fibromyalgia, cervical CA, anemia of chronic disease, HLD who presents to ED with syncopal episode. Patient had outpatient total hip replacement yesterday and was discharged home later in the day. Patient states she got up to go to the bathroom when she started to feel lightheaded. Patient was given IV fluids, was then able to ambulate and went home. At home, patient continued to feel lightheaded and weak. Patient states she stood up, fell to her knees, and passed out. Vital signs in ED: Temp 98.3, heart rate 103, respiratory rate 16, /52. Lab work significant for: Hemoglobin 7.3 and hematocrit 22.7. Patient explains she follows with hematology for chronic anemia. Baseline hemoglobin appears to be between 10-11. CTA pulmonary negative for acute process. Upon examination, patient is resting in bed. Patient currently denies dizziness or lightheadedness. Patient also denies chest pain, shortness of breath, fever, chills, nausea, vomiting, and diarrhea. Patient refused rectal exam in ED. Denies bleeding from any source. REVIEW OF SYSTEMS:  Pertinent negatives are above in HPI. 10 point ROS otherwise negative. Past Medical History:   Diagnosis Date    Anemia     Anxiety     With panic attacks.  Cancer [facility])     Cervical with radiation implant; history of hysterctomy due to CA, 1995.  Depression     Major depressive disorder with psychotic features.     Dyslipidemia     Dysrhythmia     High triglycerides     patient denies    IBS (irritable bowel syndrome)     Kidney stones     Muscle spasm     Neck injury 1992    Syncope and collapse          Past Surgical History:   Procedure Laterality Date    BONE MARROW BIOPSY  09/12/2016    left buttocks area    CATARACT REMOVAL Bilateral     CHOLECYSTECTOMY  12/23/2013    COLONOSCOPY      HYSTERECTOMY      NECK SURGERY      x 3    OTHER SURGICAL HISTORY Left 12/12/2013    CYSTOSCOPY-RETROGRADE;LASER LITHOTRIPSY;LEFT J-STENT    PA CYSTO/URETERO/PYELOSCOPY W/LITHOTRIPSY Left 4/19/2018    CYSTOSCOPY RETROGRADE PYELOGRAM LASER LITHOTRIPSY  LEFT J-MARGE stent insertion performed by Brooke Terrell MD at [address] [place] [address] ENDOSCOPY         Medications Prior to Admission:    Not in a hospital admission. Note that the patient's home medications were reviewed and the above list is accurate to the best of my knowledge at the time of the exam.    Allergies:    Patient has no known allergies. Social History:    reports that she has never smoked. She has never used smokeless tobacco. She reports that she does not drink alcohol and does not use drugs. Family History:   family history is not on file. PHYSICAL EXAM:    Vitals:  BP (!) 126/56   Pulse 88   Temp 100.1 °F (37.8 °C) (Oral)   Resp 20   Ht 5' (1.524 m)   Wt 150 lb (68 kg)   SpO2 96%   BMI 29.29 kg/m²       General appearance: NAD, conversant  Eyes: Sclerae anicteric, PERRLA  HEENT: AT/NC, MMM  Neck: FROM, supple, no thyromegaly  Lymph: No cervical / supraclavicular lymphadenopathy  Lungs: Clear to auscultation, WOB normal  CV: RRR, no MRGs, no lower extremity edema  Abdomen: Soft, non-tender; no masses or HSM, +BS  Extremities: FROM without synovitis. No clubbing or cyanosis of the hands. Skin: no rash, induration, lesions, or ulcers  Psych: Calm and cooperative. Normal judgement and insight. Normal mood and affect. Neuro: Alert and interactive, face symmetric, speech fluent. LABS:  All labs reviewed.   Of note:  CBC with Differential: patient and she voices understanding    Medication for other comorbidities continue as appropriate dose adjustment as necessary. DVT prophylaxis  PT OT  Discharge planning-she can be discharged tomorrow  Case discussed with attending and agreed upon plan of care. Code status: Full  Requires inpatient level of care  BETTY aMddox CNP    2:34 PM  10/5/2021     Above note edited to reflect my thoughts    I personally saw, examined and provided care for the patient. Radiographs, labs and medication list were reviewed by me independently. The case was discussed in detail and plans for care were established. Review of BETTY Maddox CNP, documentation was conducted and revisions were made as appropriate directly by me. I agree with the above documented exam, problem list, and plan of care.      Jacky Gutierrez MD  8:30 PM  10/6/2021

## 2021-10-05 NOTE — PROGRESS NOTES
Database complete. Medications reconciled by med tech Jena Salgado. Care plans and education initiated. Currently requires a walker per pt. Urologist is Dr. Simeon Gayle. Hematologist is Dr. Shirley Fernandez. Orthopedic Surgeon is Dr. Maye Fatima. Padmini Coburn states she is set up with [facility].

## 2021-10-05 NOTE — ED NOTES
Patient observed within first 15 minutes of transfusion. Vitals stable at this time with no distress noted.      Kassie Rico RN  10/05/21 2540

## 2021-10-05 NOTE — ED NOTES
Report given to Catrachita Prasad RN.       Clayton Hoffman RN  10/05/21 7661 S: Patient seen for L knee revision 4 24 19 . O: BP (!) 161/64   Pulse 95   Temp 98.4 °F (36.9 °C) (Axillary)   Resp 16   Ht 5' (1.524 m)   Wt 172 lb (78 kg)   SpO2 98%   BMI 33.59 kg/m² , Exam:WNL    A:  6 weeks sp L knee revision    P:  Knee doing well. Continue cares. Full consult dictation to follow.     Electronically signed by Alexander Magana MD on 5/31/2019 at 7:36 AM

## 2021-10-06 LAB
ANION GAP SERPL CALCULATED.3IONS-SCNC: 7 MMOL/L (ref 7–16)
BASOPHILIC STIPPLING: ABNORMAL
BASOPHILS ABSOLUTE: 0.01 E9/L (ref 0–0.2)
BASOPHILS RELATIVE PERCENT: 0.6 % (ref 0–2)
BLOOD BANK DISPENSE STATUS: NORMAL
BLOOD BANK PRODUCT CODE: NORMAL
BPU ID: NORMAL
BUN BLDV-MCNC: 15 MG/DL (ref 6–23)
CALCIUM SERPL-MCNC: 8 MG/DL (ref 8.6–10.2)
CHLORIDE BLD-SCNC: 108 MMOL/L (ref 98–107)
CO2: 23 MMOL/L (ref 22–29)
CREAT SERPL-MCNC: 0.8 MG/DL (ref 0.5–1)
DESCRIPTION BLOOD BANK: NORMAL
EOSINOPHILS ABSOLUTE: 0.02 E9/L (ref 0.05–0.5)
EOSINOPHILS RELATIVE PERCENT: 1.1 % (ref 0–6)
GFR AFRICAN AMERICAN: >60
GFR NON-AFRICAN AMERICAN: >60 ML/MIN/1.73
GLUCOSE BLD-MCNC: 124 MG/DL (ref 74–99)
HCT VFR BLD CALC: 22.3 % (ref 34–48)
HCT VFR BLD CALC: 22.9 % (ref 34–48)
HEMOGLOBIN: 7.1 G/DL (ref 11.5–15.5)
HEMOGLOBIN: 7.2 G/DL (ref 11.5–15.5)
IMMATURE GRANULOCYTES #: 0.01 E9/L
IMMATURE GRANULOCYTES %: 0.6 % (ref 0–5)
LV EF: 65 %
LVEF MODALITY: NORMAL
LYMPHOCYTES ABSOLUTE: 0.38 E9/L (ref 1.5–4)
LYMPHOCYTES RELATIVE PERCENT: 21.2 % (ref 20–42)
MCH RBC QN AUTO: 33.5 PG (ref 26–35)
MCHC RBC AUTO-ENTMCNC: 31.8 % (ref 32–34.5)
MCV RBC AUTO: 105.2 FL (ref 80–99.9)
MONOCYTES ABSOLUTE: 0.24 E9/L (ref 0.1–0.95)
MONOCYTES RELATIVE PERCENT: 13.4 % (ref 2–12)
NEUTROPHILS ABSOLUTE: 1.13 E9/L (ref 1.8–7.3)
NEUTROPHILS RELATIVE PERCENT: 63.1 % (ref 43–80)
OVALOCYTES: ABNORMAL
PDW BLD-RTO: 14.3 FL (ref 11.5–15)
PLATELET # BLD: 69 E9/L (ref 130–450)
PLATELET CONFIRMATION: NORMAL
PMV BLD AUTO: 9.3 FL (ref 7–12)
POIKILOCYTES: ABNORMAL
POLYCHROMASIA: ABNORMAL
POTASSIUM REFLEX MAGNESIUM: 4 MMOL/L (ref 3.5–5)
RBC # BLD: 2.12 E12/L (ref 3.5–5.5)
SODIUM BLD-SCNC: 138 MMOL/L (ref 132–146)
WBC # BLD: 1.8 E9/L (ref 4.5–11.5)

## 2021-10-06 PROCEDURE — 97165 OT EVAL LOW COMPLEX 30 MIN: CPT

## 2021-10-06 PROCEDURE — 97530 THERAPEUTIC ACTIVITIES: CPT

## 2021-10-06 PROCEDURE — 85025 COMPLETE CBC W/AUTO DIFF WBC: CPT

## 2021-10-06 PROCEDURE — 36415 COLL VENOUS BLD VENIPUNCTURE: CPT

## 2021-10-06 PROCEDURE — G0378 HOSPITAL OBSERVATION PER HR: HCPCS

## 2021-10-06 PROCEDURE — 96372 THER/PROPH/DIAG INJ SC/IM: CPT

## 2021-10-06 PROCEDURE — 2580000003 HC RX 258: Performed by: PHYSICIAN ASSISTANT

## 2021-10-06 PROCEDURE — 6360000002 HC RX W HCPCS: Performed by: PHYSICIAN ASSISTANT

## 2021-10-06 PROCEDURE — 2060000000 HC ICU INTERMEDIATE R&B

## 2021-10-06 PROCEDURE — 6370000000 HC RX 637 (ALT 250 FOR IP): Performed by: ORTHOPAEDIC SURGERY

## 2021-10-06 PROCEDURE — 85014 HEMATOCRIT: CPT

## 2021-10-06 PROCEDURE — 80048 BASIC METABOLIC PNL TOTAL CA: CPT

## 2021-10-06 PROCEDURE — 97161 PT EVAL LOW COMPLEX 20 MIN: CPT

## 2021-10-06 PROCEDURE — 6370000000 HC RX 637 (ALT 250 FOR IP): Performed by: PHYSICIAN ASSISTANT

## 2021-10-06 PROCEDURE — 93306 TTE W/DOPPLER COMPLETE: CPT

## 2021-10-06 PROCEDURE — 36430 TRANSFUSION BLD/BLD COMPNT: CPT

## 2021-10-06 PROCEDURE — 85018 HEMOGLOBIN: CPT

## 2021-10-06 RX ORDER — SODIUM CHLORIDE 9 MG/ML
INJECTION, SOLUTION INTRAVENOUS PRN
Status: DISCONTINUED | OUTPATIENT
Start: 2021-10-06 | End: 2021-10-07 | Stop reason: HOSPADM

## 2021-10-06 RX ORDER — TRAMADOL HYDROCHLORIDE 50 MG/1
TABLET ORAL
Status: DISPENSED
Start: 2021-10-06 | End: 2021-10-06

## 2021-10-06 RX ADMIN — PALIPERIDONE 3 MG: 3 TABLET, EXTENDED RELEASE ORAL at 09:32

## 2021-10-06 RX ADMIN — ASPIRIN 325 MG: 325 TABLET, COATED ORAL at 17:34

## 2021-10-06 RX ADMIN — ALPRAZOLAM 0.5 MG: 0.25 TABLET ORAL at 09:47

## 2021-10-06 RX ADMIN — ENOXAPARIN SODIUM 40 MG: 40 INJECTION SUBCUTANEOUS at 09:32

## 2021-10-06 RX ADMIN — TRAMADOL HYDROCHLORIDE 50 MG: 50 TABLET, COATED ORAL at 09:59

## 2021-10-06 RX ADMIN — SODIUM CHLORIDE, PRESERVATIVE FREE 10 ML: 5 INJECTION INTRAVENOUS at 20:01

## 2021-10-06 RX ADMIN — SODIUM CHLORIDE, PRESERVATIVE FREE 10 ML: 5 INJECTION INTRAVENOUS at 02:34

## 2021-10-06 RX ADMIN — SODIUM CHLORIDE: 9 INJECTION, SOLUTION INTRAVENOUS at 02:34

## 2021-10-06 RX ADMIN — ACETAMINOPHEN 650 MG: 325 TABLET ORAL at 20:01

## 2021-10-06 RX ADMIN — TRAMADOL HYDROCHLORIDE 50 MG: 50 TABLET, COATED ORAL at 02:34

## 2021-10-06 RX ADMIN — ASPIRIN 81 MG: 81 TABLET, COATED ORAL at 09:32

## 2021-10-06 RX ADMIN — SODIUM CHLORIDE, PRESERVATIVE FREE 10 ML: 5 INJECTION INTRAVENOUS at 13:14

## 2021-10-06 RX ADMIN — SODIUM CHLORIDE, PRESERVATIVE FREE 10 ML: 5 INJECTION INTRAVENOUS at 09:32

## 2021-10-06 RX ADMIN — VORTIOXETINE 20 MG: 10 TABLET, FILM COATED ORAL at 09:32

## 2021-10-06 RX ADMIN — ALPRAZOLAM 0.5 MG: 0.25 TABLET ORAL at 02:34

## 2021-10-06 RX ADMIN — TRAMADOL HYDROCHLORIDE 50 MG: 50 TABLET, COATED ORAL at 18:39

## 2021-10-06 ASSESSMENT — PAIN DESCRIPTION - ORIENTATION
ORIENTATION: RIGHT
ORIENTATION: RIGHT

## 2021-10-06 ASSESSMENT — PAIN SCALES - GENERAL
PAINLEVEL_OUTOF10: 7
PAINLEVEL_OUTOF10: 0
PAINLEVEL_OUTOF10: 0
PAINLEVEL_OUTOF10: 7
PAINLEVEL_OUTOF10: 7
PAINLEVEL_OUTOF10: 0

## 2021-10-06 ASSESSMENT — PAIN DESCRIPTION - PAIN TYPE
TYPE: SURGICAL PAIN
TYPE: SURGICAL PAIN

## 2021-10-06 ASSESSMENT — PAIN DESCRIPTION - LOCATION
LOCATION: HIP
LOCATION: HIP

## 2021-10-06 ASSESSMENT — PAIN DESCRIPTION - PROGRESSION: CLINICAL_PROGRESSION: NOT CHANGED

## 2021-10-06 ASSESSMENT — PAIN DESCRIPTION - DESCRIPTORS: DESCRIPTORS: ACHING;DISCOMFORT

## 2021-10-06 ASSESSMENT — PAIN - FUNCTIONAL ASSESSMENT: PAIN_FUNCTIONAL_ASSESSMENT: PREVENTS OR INTERFERES SOME ACTIVE ACTIVITIES AND ADLS

## 2021-10-06 ASSESSMENT — PAIN DESCRIPTION - ONSET: ONSET: ON-GOING

## 2021-10-06 ASSESSMENT — PAIN DESCRIPTION - FREQUENCY: FREQUENCY: CONTINUOUS

## 2021-10-06 NOTE — PROGRESS NOTES
Orthopaedic Surgery Progress Note  SHADI Reyes MD      SUBJECTIVE:  Pt admitted after 2 syncopal episodes after surgery. She presented to [address] [place] [address] with Hgb 7.3. She was started on Lovenox instead of continuing  mg daily and hgb dropped to 6.5. She was transfused 1 unit PRBC. I was not consulted or notified Anthony Stock was admitted to the hospital.      Currently she is doing well. No acute events over night. Pain controlled. Denies chest pain or shortness of breath. She did walk with a walker. OBJECTIVE:   AAOx3, NAD    Right lower extremity    Dressings C/D/I  SILT L1-S1  TA/EHL/GS intact  Calf soft, NT  +2 DP, W/WP     Data:  CBC:   Lab Results   Component Value Date    WBC 1.8 10/06/2021    RBC 2.12 10/06/2021    HGB 7.2 10/06/2021    HCT 22.9 10/06/2021    .2 10/06/2021    MCH 33.5 10/06/2021    MCHC 31.8 10/06/2021    RDW 14.3 10/06/2021    PLT 69 10/06/2021    MPV 9.3 10/06/2021     BMP:    Lab Results   Component Value Date     10/06/2021    K 4.0 10/06/2021     10/06/2021    CO2 23 10/06/2021    BUN 15 10/06/2021    LABALBU 3.1 10/04/2021    LABALBU 4.2 11/12/2010    CREATININE 0.8 10/06/2021    CALCIUM 8.0 10/06/2021    GFRAA >60 10/06/2021    LABGLOM >60 10/06/2021    GLUCOSE 124 10/06/2021    GLUCOSE 118 11/12/2010         A/P: POD 2 right ECHO, acute blood loss anemia  - Recommend she be transfused 1 more unit PRBC  - Hold lovenox and begin  mg daily  - WBAT  - Pain control  - PT/OT  - D/C planning for home  - Follow up in the office in 2 weeks at scheduled appointment    SHADI Reyes MD

## 2021-10-06 NOTE — PROGRESS NOTES
Occupational Therapy  OCCUPATIONAL THERAPY INITIAL EVALUATION     [address] [place], [place]        ZAZX:                                                  Patient Name: Merline Snipe    MRN: [id number]    : 1950    Room: [address] [place] [address]      Evaluating OT: Law Herring, OTR/L      Referring Provider: JON Martinez    Specific Provider Orders/Date: OT eval and treat 10/5/21      Diagnosis:  Syncope and collapse [R55]  Anemia, unspecified type [D64.9]   S/P: R hip replacement 10/4/21 at [id number] [place] [address]    Pertinent Medical History: syncope, collapse, anemia, neck injury      Precautions:  Fall Risk, posterolateral hip, WBAT     Assessment of current deficits   [x] Functional mobility  [x]ADLs  [x] Strength               []Cognition    [x] Functional transfers   [x] IADLs         [x] Safety Awareness   [x]Endurance    [] Fine Coordination              [x] Balance      [] Vision/perception   []Sensation     []Gross Motor Coordination  [] ROM  [] Delirium                   [] Motor Control     OT PLAN OF CARE   OT POC based on physician orders, patient diagnosis and results of clinical assessment    Frequency/Duration 2-4 days/wk for 2 weeks PRN   Specific OT Treatment Interventions to include:   * Instruction/training on adapted ADL techniques and AE recommendations to increase functional independence within precautions       * Training on energy conservation strategies, correct breathing pattern and techniques to improve independence/tolerance for self-care routine  * Functional transfer/mobility training/DME recommendations for increased independence, safety, and fall prevention  * Patient/Family education to increase follow through with safety techniques and functional independence  * Recommendation of environmental modifications for increased safety with functional transfers/mobility and ADLs  * Therapeutic exercise to improve motor endurance, ROM, and functional strength for ADLs/functional transfers  * Therapeutic activities to facilitate/challenge dynamic balance, stand tolerance for increased safety and independence with ADLs        Recommended Adaptive Equipment: reacher, sock aid, long handled sponge, long handled shoehorn    Home Living: Pt lives with son in 1 story apartment with 6 THOMAS and 1 rail. Bathroom setup: tub/shower; pt reports that she is borrowing shower chair from brother. Unclear if it is a shower chair or tub bench. Equipment owned: shower chair, wheeled walker, cane. Prior Level of Function: independent with ADLs , independent with IADLs; ambulated with wheeled walker since surgery on 10/4/21; used cane PRN prior to surgery. Pain Level: Pt reported pinching pain in R hip that increases when standing  Cognition: A&O: 4/4;    Memory:  good   Sequencing:  good   Problem solving:  good   Judgement/safety:  Fair+     Functional Assessment:  AM-PAC Daily Activity Raw Score: 17/24   Initial Eval Status  Date: 10/5/21 Treatment Status  Date: STGs = LTGs  Time frame: 10-14 days   Feeding Independent      Grooming Stand by Assist  Independent    UB Dressing Supervision/Setup   Independent    LB Dressing Max Assist  Educated on hip precautions and potential adaptive equipment for LB ADLs. Could benefit from reinforcement. Modified [place]    Bathing Moderate Assist  Modified [place]    Toileting Minimal Assist   Modified [place]    Bed Mobility  Supine to sit: Minimal Assist     Supine to sit:  Independent      Functional Transfers Contact Guard Assist   Sit <> stand from bed  Independent    Functional Mobility Contact Guard Assist with ww  Short distance in room  Modified [place]    Balance Sitting:     Static:  independent    Dynamic: Min assist  Standing: CGA     Activity Tolerance Good with light activity     Visual/  Perceptual Glasses: none at bedside                Hand Dominance R

## 2021-10-06 NOTE — PROGRESS NOTES
Physical Therapy    Facility/Department: [facility] SURG  Initial Assessment    NAME: Gumaro Dick  : 1950  MRN: [id number]    Date of Service: 10/6/2021       REQUIRES PT FOLLOW UP: Yes       Patient Diagnosis(es): The primary encounter diagnosis was Syncope and collapse. A diagnosis of Anemia, unspecified type was also pertinent to this visit. has a past medical history of Anemia, Anxiety, Cancer ([address] Depression, Dyslipidemia, Dysrhythmia, High triglycerides, IBS (irritable bowel syndrome), Kidney stones, Muscle spasm, Neck injury, and Syncope and collapse.   has a past surgical history that includes Neck surgery; Hysterectomy; other surgical history (Left, 2013); Cholecystectomy (2013); Tonsillectomy; bone marrow biopsy (2016); Cataract removal (Bilateral); Colonoscopy; Upper gastrointestinal endoscopy; and pr cysto/uretero/pyeloscopy w/lithotripsy (Left, 2018). Evaluating Therapist: Aliyah Baltazar PT     Referring Provider:  JON Motley    PT order : PT eval and treat     Room #:  604   DIAGNOSIS:  Syncope and collapse. Additional Pertinent History: R ECHO 10/4 at surgical center. Passed out once home   PRECAUTIONS:  Falls, posterolateral hip, WBAT     Social:  Pt lives with  Son  in a  1  floor plan  6  steps and  1  rails to enter. Prior to admission pt walked with  ww since sx      Initial Evaluation  Date: 10/6/2021  Treatment      Short Term/ Long Term   Goals   Was pt agreeable to Eval/treatment? Yes      Does pt have pain?  R hip      Bed Mobility  Rolling:  NT    Supine to sit: min assist   Sit to supine:  NT   Scooting:  SBA    S/I    Transfers Sit to stand:  CGA   Stand to sit:  CGA   Stand pivot:  NT    S/I     Ambulation     100  feet with ww  with  SBA /CGA  200 feet with ww  with SBA        Stair negotiation: ascended and descended NT    6  steps with  1  rail with  SBA    LE ROM  Decreased throughout R hip, distally WFL      LE strength  3-/ 5 R hip, 4/ 5 distally      AM- PAC RAW score   18/ 24            Pt is alert and Oriented x 3     Balance: SBA/CGA. Fall risk due to LE weakness and recent syncopal episode. Endurance: decreased   Bed/Chair alarm: no chair alarms available      ASSESSMENT  Pt displays functional ability as noted in the objective portion of this evaluation. Conditions Requiring Skilled Therapeutic Intervention:    [x]Decreased strength     []Decreased ROM  [x]Decreased functional mobility  [x]Decreased balance   [x]Decreased endurance   [x]Decreased posture  []Decreased sensation  []Decreased coordination   []Decreased vision  []Decreased safety awareness   [x]Increased pain       Treatment/Education:    Pt in bed upon arrival; agreeable to PT. Instructed in hip precautions prior to mobility. Min assist needed for R LE with supine to sit. Cues for hand and foot placement with transfers. Pt amb with reciprocal gait, flexed posture. Pt educated on fall risk,  Safe and proper technique with mobility        Patient response to education:   Pt verbalized understanding Pt demonstrated skill Pt requires further education in this area    x  with cues   x       Comments:  Pt left  In chair after session, with call light in reach. Rehab potential is Good for reaching above PT goals. Pts/ family goals   1. None stated     Patient and or family understand(s) diagnosis, prognosis, and plan of care. -  Yes     PLAN  PT care will be provided in accordance with the objectives noted above. Whenever appropriate, clear delegation orders will be provided for nursing staff. Exercises and functional mobility practice will be used as well as appropriate assistive devices or modalities to obtain goals. Patient and family education will also be administered as needed.         PLAN OF CARE:    Current Treatment Recommendations     [x] Strengthening to improve independence with functional mobility   [x] ROM to improve independence with functional mobility   [x] Balance Training to improve static/dynamic balance and to reduce fall risk  [x] Endurance Training to improve activity tolerance during functional mobility   [x] Transfer Training to improve safety and independence with all functional transfers   [x] Gait Training to improve gait mechanics, endurance and assess need for appropriate assistive device  [x] Stair Training in preparation for safe discharge home and/or into the community   [x] Positioning to prevent skin breakdown and contractures  [x] Safety and Education Training   [x] Patient/Caregiver Education   [] HEP  [] Other      Frequency of treatments will be daily as able x 3- 7 days. Time in: 1050   Time out:  1115       Evaluation Time includes thorough review of current medical information, gathering information on past medical history/social history and prior level of function, completion of standardized testing/informal observation of tasks, assessment of data and education on plan of care and goals.     CPT codes:  [x] Low Complexity PT evaluation 42018  [] Moderate Complexity PT evaluation 84822  [] High Complexity PT evaluation 42387  [] PT Re-evaluation 71105  [] Gait training 56086  minutes  [x] Therapeutic activities 35120 10 minutes  [] Therapeutic exercises 12223  minutes  [] Neuromuscular reeducation 45159  minutes       Hubert 18 number:  PT 0103

## 2021-10-07 VITALS
HEIGHT: 60 IN | TEMPERATURE: 99.5 F | RESPIRATION RATE: 20 BRPM | HEART RATE: 91 BPM | DIASTOLIC BLOOD PRESSURE: 55 MMHG | BODY MASS INDEX: 31.25 KG/M2 | OXYGEN SATURATION: 100 % | WEIGHT: 159.2 LBS | SYSTOLIC BLOOD PRESSURE: 112 MMHG

## 2021-10-07 LAB
ANISOCYTOSIS: ABNORMAL
BASOPHILS ABSOLUTE: 0.03 E9/L (ref 0–0.2)
BASOPHILS RELATIVE PERCENT: 1.8 % (ref 0–2)
EOSINOPHILS ABSOLUTE: 0.03 E9/L (ref 0.05–0.5)
EOSINOPHILS RELATIVE PERCENT: 1.8 % (ref 0–6)
HCT VFR BLD CALC: 26.7 % (ref 34–48)
HEMOGLOBIN: 8.3 G/DL (ref 11.5–15.5)
LYMPHOCYTES ABSOLUTE: 0.48 E9/L (ref 1.5–4)
LYMPHOCYTES RELATIVE PERCENT: 25.4 % (ref 20–42)
MCH RBC QN AUTO: 32.8 PG (ref 26–35)
MCHC RBC AUTO-ENTMCNC: 31.1 % (ref 32–34.5)
MCV RBC AUTO: 105.5 FL (ref 80–99.9)
MONOCYTES ABSOLUTE: 0.08 E9/L (ref 0.1–0.95)
MONOCYTES RELATIVE PERCENT: 3.5 % (ref 2–12)
NEUTROPHILS ABSOLUTE: 1.29 E9/L (ref 1.8–7.3)
NEUTROPHILS RELATIVE PERCENT: 67.5 % (ref 43–80)
NUCLEATED RED BLOOD CELLS: 0 /100 WBC
OVALOCYTES: ABNORMAL
PDW BLD-RTO: 14.7 FL (ref 11.5–15)
PLATELET # BLD: 83 E9/L (ref 130–450)
PLATELET CONFIRMATION: NORMAL
PMV BLD AUTO: 9.4 FL (ref 7–12)
POIKILOCYTES: ABNORMAL
RBC # BLD: 2.53 E12/L (ref 3.5–5.5)
URINE CULTURE, ROUTINE: NORMAL
WBC # BLD: 1.9 E9/L (ref 4.5–11.5)

## 2021-10-07 PROCEDURE — 2580000003 HC RX 258: Performed by: PHYSICIAN ASSISTANT

## 2021-10-07 PROCEDURE — 85025 COMPLETE CBC W/AUTO DIFF WBC: CPT

## 2021-10-07 PROCEDURE — 97530 THERAPEUTIC ACTIVITIES: CPT

## 2021-10-07 PROCEDURE — 97535 SELF CARE MNGMENT TRAINING: CPT

## 2021-10-07 PROCEDURE — 6370000000 HC RX 637 (ALT 250 FOR IP): Performed by: PHYSICIAN ASSISTANT

## 2021-10-07 PROCEDURE — 36415 COLL VENOUS BLD VENIPUNCTURE: CPT

## 2021-10-07 PROCEDURE — G0378 HOSPITAL OBSERVATION PER HR: HCPCS

## 2021-10-07 PROCEDURE — 6370000000 HC RX 637 (ALT 250 FOR IP): Performed by: ORTHOPAEDIC SURGERY

## 2021-10-07 RX ADMIN — ALPRAZOLAM 0.5 MG: 0.25 TABLET ORAL at 00:04

## 2021-10-07 RX ADMIN — ASPIRIN 325 MG: 325 TABLET, COATED ORAL at 08:03

## 2021-10-07 RX ADMIN — ALPRAZOLAM 0.5 MG: 0.25 TABLET ORAL at 17:52

## 2021-10-07 RX ADMIN — TRAMADOL HYDROCHLORIDE 50 MG: 50 TABLET, COATED ORAL at 08:09

## 2021-10-07 RX ADMIN — VORTIOXETINE 20 MG: 10 TABLET, FILM COATED ORAL at 08:03

## 2021-10-07 RX ADMIN — ALPRAZOLAM 0.5 MG: 0.25 TABLET ORAL at 08:03

## 2021-10-07 RX ADMIN — PALIPERIDONE 3 MG: 3 TABLET, EXTENDED RELEASE ORAL at 08:04

## 2021-10-07 RX ADMIN — TRAZODONE HYDROCHLORIDE 100 MG: 50 TABLET ORAL at 00:04

## 2021-10-07 RX ADMIN — TRAMADOL HYDROCHLORIDE 50 MG: 50 TABLET, COATED ORAL at 17:53

## 2021-10-07 RX ADMIN — SODIUM CHLORIDE, PRESERVATIVE FREE 10 ML: 5 INJECTION INTRAVENOUS at 08:09

## 2021-10-07 ASSESSMENT — PAIN DESCRIPTION - LOCATION: LOCATION: HIP

## 2021-10-07 ASSESSMENT — PAIN DESCRIPTION - ORIENTATION: ORIENTATION: RIGHT

## 2021-10-07 ASSESSMENT — PAIN SCALES - GENERAL: PAINLEVEL_OUTOF10: 7

## 2021-10-07 NOTE — PROGRESS NOTES
Physical Therapy    Facility/Department: [facility] SURG  Treatment note    NAME: Trent Araujo  : 1950  MRN: [id number]    Date of Service: 10/7/2021               Patient Diagnosis(es): The primary encounter diagnosis was Syncope and collapse. A diagnosis of Anemia, unspecified type was also pertinent to this visit. has a past medical history of Anemia, Anxiety, Cancer ([address] Depression, Dyslipidemia, Dysrhythmia, High triglycerides, IBS (irritable bowel syndrome), Kidney stones, Muscle spasm, Neck injury, and Syncope and collapse.   has a past surgical history that includes Neck surgery; Hysterectomy; other surgical history (Left, 2013); Cholecystectomy (2013); Tonsillectomy; bone marrow biopsy (2016); Cataract removal (Bilateral); Colonoscopy; Upper gastrointestinal endoscopy; and pr cysto/uretero/pyeloscopy w/lithotripsy (Left, 2018). Evaluating Therapist: Zoltan Cifuentes PT     Referring Provider:  JON Hendrickson    PT order : PT eval and treat     Room #:  604   DIAGNOSIS:  Syncope and collapse. Additional Pertinent History: R ECHO 10/4 at surgical center. Passed out once home   PRECAUTIONS:  Falls, posterolateral hip, WBAT     Social:  Pt lives with  Son  in a  1  floor plan  6  steps and  1  rails to enter. Prior to admission pt walked with  ww since sx      Initial Evaluation  Date: 10/6/2021  Treatment  10/7/21    Short Term/ Long Term   Goals   Was pt agreeable to Eval/treatment? Yes  yes    Does pt have pain?  R hip  R hip pain/soreness    Bed Mobility  Rolling:  NT    Supine to sit: min assist   Sit to supine:  NT   Scooting:  SBA  Supine to sit: Min A R LE support  S/I    Transfers Sit to stand:  CGA   Stand to sit:  CGA   Stand pivot:  NT  Sit <> stand CGA  S/I     Ambulation     100  feet with ww  with  SBA /CGA 15 x 2 + 140 feet x 1 using WW for support CGA for balance 200 feet with ww  with SBA        Stair negotiation: ascended and descended NT  NT  6 steps with  1  rail with  SBA    LE ROM  Decreased throughout R hip, distally WFL      LE strength  3-/ 5 R hip, 4/ 5 distally      AM- PAC RAW score   18/ 24 18/24          Pt is alert and able to follow instruction  Balance: fair minus dynamic using [employer] for support    Pt performed therapeutic exercise of the following: Pt displayed quad sets ankle pumps while in the chair, was encouraged to perform there to tolerance    Patient education  Pt was educated on UE usage to assist with transfers, gait promoting posture, exercise promoting circulation and strengthening    Patient response to education:   Pt verbalized understanding Pt demonstrated skill Pt requires further education in this area   Yes With instruction yes     ASSESSMENT:   Comments: Nurse ok with rx. Pt found in bed, agreed to rx, states needs to go to the bathroom. Pt assisted to EOB, sat EOB SBA. Gait to the bathroom performed followed by gait to the hallway, leann slow, inconsistent, step to pattern used. Pt slightly unsteady, states feels weak, light hands on assist required for balance and safety. Pt fatigued after activity. Treatment: Pt practiced and was instructed in the following treatment: transfer safety, gait mechanics, therapeutic exercise    Pt was left in a bedside chair with call light in reach. Time in 0758   Time out 0828   Total Treatment Time 30 minutes   CPT codes:     Therapeutic activities 30766 30 minutes   Therapeutic exercises 36627 0 minutes       Pt is making fair progress toward established Physical Therapy goals. Continue with physical therapy current plan of care.     Izzy Ray [facility]   License Number: [id number]

## 2021-10-07 NOTE — DISCHARGE SUMMARY
Physician Discharge Summary     Patient ID:  Nilesh Julio  [id number]  47 y.o.  1950    Admit date: 10/4/2021    Discharge date and time: 10/7/2021    Admission Diagnoses:   Patient Active Problem List   Diagnosis    Fibromyalgia    IBS (irritable bowel syndrome)    Major depression    Anxiety    Syncope    Orthostatic hypotension    Mixed hyperlipidemia    Anemia of chronic disease    Syncope and collapse       Discharge Diagnoses: Syncope    Consults: none    Procedures: none    Hospital Course: The patient is a 70 y.o. female of [address] [place] [address],  with significant past medical history of anxiety, depression, fibromyalgia, cervical CA, anemia of chronic disease, HLD who presents to ED with syncopal episode. Syncope  -Syncopal episode post op same-day hip replacement 10/4  -Hx chronic anemia  -Hgb 7.3/hematocrit 22.7 on admit >> 6.5/20.9 >>transfusion >> 7.1/22.3>> 7.2/22.9 2nd transfusion >> 8.3/26.7 today  -BP stable + orthostatics despite blood transfusions and IVF hydration will order PANFILO hose knee high. Discussed importance of habit changes when rising from sitting position, keeping legs elevated at rest etc. she voices understanding  -US bilateral carotids: right 0-50% stenosis, left 50-69% stenosis  -ECHO EF 65%   -IV hydration  -Daily labs  -Monitor vital signs        DVT Prophylaxis   PT/OT   Discharge planning home on discharge with outpatient therapy, possibly today. Recent Labs     10/04/21  2008 10/05/21  1544 10/06/21  0359 10/06/21  1310 10/07/21  0455   WBC 2.3*  --  1.8*  --  1.9*   HGB 7.3*   < > 7.1* 7.2* 8.3*   HCT 22.7*   < > 22.3* 22.9* 26.7*   PLT 86*  --  69*  --  83*    < > = values in this interval not displayed.        Recent Labs     10/04/21  2008 10/06/21  0359    138   K 5.0 4.0    108*   CO2 24 23   BUN 21 15   CREATININE 0.9 0.8   CALCIUM 8.3* 8.0*       XR CHEST PORTABLE    Result Date: 10/4/2021  EXAMINATION: ONE XRAY VIEW OF THE CHEST 10/4/2021 7:33 pm COMPARISON: 01/01/2017 HISTORY: ORDERING SYSTEM PROVIDED HISTORY: syncope TECHNOLOGIST PROVIDED HISTORY: Reason for exam:->syncope FINDINGS: The lungs are without acute focal process. There is no effusion or pneumothorax. The cardiomediastinal silhouette is without acute process. The osseous structures are without acute process. No acute process. CTA PULMONARY W CONTRAST    Result Date: 10/4/2021  EXAMINATION: CTA OF THE CHEST 10/4/2021 10:12 pm TECHNIQUE: CTA of the chest was performed after the administration of intravenous contrast.  Multiplanar reformatted images are provided for review. MIP images are provided for review. Dose modulation, iterative reconstruction, and/or weight based adjustment of the mA/kV was utilized to reduce the radiation dose to as low as reasonably achievable. COMPARISON: None. HISTORY: ORDERING SYSTEM PROVIDED HISTORY: elevated dimer; syncope TECHNOLOGIST PROVIDED HISTORY: Reason for exam:->elevated dimer; syncope Decision Support Exception - unselect if not a suspected or confirmed emergency medical condition->Emergency Medical Condition (MA) FINDINGS: There is no acute pulmonary embolus in the main PA, right left main PAs in the lobar, segmental and subsegmental branches to the 3/4 order. There is no aneurysm formation or dissection of the thoracic aorta. Heart is normal size. Calcifications are seen in the coronary arteries. There is no pericardial effusion. There is no mediastinal masses or adenopathy. Lungs are normally expanded. Areas of linear subsegmental atelectasis of undetermined age but more likely subacute chronic findings are seen both lungs. There is no pleural effusions. There is no acute infiltrates. Upper abdominal structures are not fully covered on this study. What is visualized appear unremarkable. Patient had previous cholecystectomy. There is fat replacement of the pancreas.   The biliary tree and pancreatic ductal systems are not dilated. The adrenals do not appears to be enlarged. Calcified atheromatous changes are seen in the visualized upper abdominal aorta. No evidence of acute pulmonary embolism. No aneurysm formation or dissection of the thoracic aorta. No acute pulmonary process. .     US CAROTID ARTERY BILATERAL    Result Date: 10/5/2021  EXAMINATION: ULTRASOUND EVALUATION OF THE CAROTID ARTERIES 10/5/2021 TECHNIQUE: Duplex ultrasound using B-mode/gray scaled imaging, Doppler spectral analysis and color flow Doppler was obtained of the carotid arteries. COMPARISON: None. HISTORY: ORDERING SYSTEM PROVIDED HISTORY: syncopal episode TECHNOLOGIST PROVIDED HISTORY: Reason for exam:->syncopal episode What reading provider will be dictating this exam?->CRC FINDINGS: RIGHT: There is minimal plaque at the right carotid bifurcation. The right cervical internal carotid artery displays peak systolic velocity of 180 cm/sec and normal vascular waveform. The right cervical vertebral artery is patent with appropriate direction of flow. LEFT: There is minimal plaque at the left carotid bifurcation. The left cervical internal carotid artery displays peak systolic velocity of 297 cm/sec and normal vascular waveform. The left cervical vertebral artery is patent with appropriate direction of flow. The right internal carotid artery demonstrates 0-50% stenosis. The left internal carotid artery demonstrates 50-69% stenosis, at the bulb. Bilateral vertebral arteries are patent with flow in the normal direction. Discharge Exam:    HEENT: NCAT,  PERRLA, No JVD  Heart:  RRR, no murmurs, gallops, or rubs.   Lungs:  CTA bilaterally, no wheeze, rales or rhonchi  Abd: bowel sounds present, nontender, nondistended, no masses  Extrem:  No clubbing, cyanosis, or edema    Disposition: home     Patient Condition at Discharge: Stable    Patient Instructions:      Medication List      CONTINUE taking these medications    aspirin 81 MG tablet     Centrum MultiGummies Chew     Invega 3 MG extended release tablet  Generic drug: paliperidone     traMADol 50 MG tablet  Commonly known as: ULTRAM     traZODone 100 MG tablet  Commonly known as: DESYREL  Take 1 tablet by mouth nightly. Trintellix 20 MG Tabs tablet  Generic drug: VORTIoxetine HBr     Xanax 0.5 MG tablet  Generic drug: ALPRAZolam          Activity: activity as tolerated  Diet: regular diet    Pt has been advised to: Follow-up with LEANDRA SUTTON III, DO in 1 week. Follow-up with consultants as recommended by them    Note that over 30 minutes was spent in preparing discharge papers, discussing discharge with patient, medication review, etc.    Signed:  BETTY Light CNP  10/7/2021  4:04 PM     Above note edited to reflect my thoughts     I personally saw, examined and provided care for the patient. Radiographs, labs and medication list were reviewed by me independently. The case was discussed in detail and plans for care were established. Review of MARIA GUADALUPE Light   , documentation was conducted and revisions were made as appropriate directly by me. I agree with the above documented exam, problem list, and plan of care.      Raúl Luna MD  7:22 PM  10/7/2021

## 2021-10-07 NOTE — CARE COORDINATION
Social work / Discharge Planning:       Patient lives with her son and uses a ww. She is active with [facility]. No new orders needed per liaison. AM PAC 18/24 (walking 100 ft). Patient does not qualify for JULISA. Discharge plan is home with [facility].   Electronically signed by BRIAN Jeffries on 10/7/2021 at 10:13 AM

## 2021-10-07 NOTE — PROGRESS NOTES
Subjective: The patient is awake and alert. States she feels better than she did a few days ago. No acute events overnight. Denies chest pain, angina, SOB     Objective:    BP (!) 112/55   Pulse 91   Temp 99.5 °F (37.5 °C) (Oral)   Resp 20   Ht 5' (1.524 m)   Wt 159 lb 3.2 oz (72.2 kg)   SpO2 100%   BMI 31.09 kg/m²     In: 777 [I.V.:777]  Out: 300   In: 777   Out: 300 [Urine:300]    General appearance: NAD, conversant  HEENT: AT/NC, MMM  Neck: FROM, supple  Lungs: Clear to auscultation  CV: RRR, no MRGs  Vasc: Radial pulses 2+  Abdomen: Soft, non-tender; no masses or HSM  Extremities: No peripheral edema or digital cyanosis, R hip dressing CDI  Skin: no rash, lesions or ulcers  Psych: Alert and oriented to person, place and time  Neuro: Alert and interactive     Recent Labs     10/04/21  2008 10/05/21  1544 10/06/21  0359 10/06/21  1310 10/07/21  0455   WBC 2.3*  --  1.8*  --  1.9*   HGB 7.3*   < > 7.1* 7.2* 8.3*   HCT 22.7*   < > 22.3* 22.9* 26.7*   PLT 86*  --  69*  --  83*    < > = values in this interval not displayed. Recent Labs     10/04/21  2008 10/06/21  0359    138   K 5.0 4.0    108*   CO2 24 23   BUN 21 15   CREATININE 0.9 0.8   CALCIUM 8.3* 8.0*       Assessment:    Principal Problem:    Syncope  Active Problems:    Syncope and collapse  Resolved Problems:    * No resolved hospital problems. *      Plan:  Patient is a 70year old with past medical history of anxiety, depression, fibromyalgia, cervical CA, anemia of chronic disease, HLD who presents to ED with syncopal episode.     Syncope  -Syncopal episode post op same-day hip replacement 10/4  -Hx chronic anemia  -Hgb 7.3/hematocrit 22.7 on admit >> 6.5/20.9 >>transfusion >> 7.1/22.3>> 7.2/22.9 2nd transfusion >> 8.3/26.7 today  -BP stable + orthostatics despite blood transfusions and IVF hydration will order PANFILO hose knee high.   -US bilateral carotids: right 0-50% stenosis, left 50-69% stenosis  -ECHO EF 65%   -IV hydration  -Daily labs  -Monitor vital signs      DVT Prophylaxis   PT/OT   Discharge planning home on discharge with outpatient therapy, possibly today. BETTY Castro CNP  10:47 AM  10/7/2021     Above note edited to reflect my thoughts     I personally saw, examined and provided care for the patient. Radiographs, labs and medication list were reviewed by me independently. The case was discussed in detail and plans for care were established. Review of MARIA GUADALUPE Castro   , documentation was conducted and revisions were made as appropriate directly by me. I agree with the above documented exam, problem list, and plan of care.      Maria Guadalupe Aragon MD  7:21 PM  10/7/2021

## 2021-10-07 NOTE — PROGRESS NOTES
Occupational Therapy  OT BEDSIDE TREATMENT NOTE      Date:10/7/2021  Patient Name: Savita Lynn  MRN: [id number]  : 1950  Room: [address] [place] [address]     Evaluating OT: VALENTÍN Bustillo/L       Referring Provider: JON Layton    Specific Provider Orders/Date: OT eval and treat 10/5/21       Diagnosis:  Syncope and collapse [R55]  Anemia, unspecified type [D64.9]   S/P: R hip replacement 10/4/21 at [address] [place] [address]    Pertinent Medical History: syncope, collapse, anemia, neck injury       Precautions:  Fall Risk, posterolateral hip, WBAT      Assessment of current deficits   [x]? Functional mobility            [x]?ADLs           [x]? Strength                  []?Cognition    [x]? Functional transfers          [x]? IADLs         [x]? Safety Awareness   [x]? Endurance    []? Fine Coordination                         [x]? Balance      []? Vision/perception   []? Sensation      []? Gross Motor Coordination             []? ROM           []?  Delirium                   []? Motor Control      OT PLAN OF CARE   OT POC based on physician orders, patient diagnosis and results of clinical assessment     Frequency/Duration 2-4 days/wk for 2 weeks PRN   Specific OT Treatment Interventions to include:   * Instruction/training on adapted ADL techniques and AE recommendations to increase functional independence within precautions       * Training on energy conservation strategies, correct breathing pattern and techniques to improve independence/tolerance for self-care routine  * Functional transfer/mobility training/DME recommendations for increased independence, safety, and fall prevention  * Patient/Family education to increase follow through with safety techniques and functional independence  * Recommendation of environmental modifications for increased safety with functional transfers/mobility and ADLs  * Therapeutic exercise to improve motor endurance, ROM, and functional strength for ADLs/functional transfers  * Therapeutic activities to facilitate/challenge dynamic balance, stand tolerance for increased safety and independence with ADLs           Recommended Adaptive Equipment: hip kit     Home Living: Pt lives with son in 1 story apartment with 6 THOMAS and 1 rail. Bathroom setup: tub/shower; pt reports that she is borrowing shower chair from brother. Unclear if it is a shower chair or tub bench. Equipment owned: shower chair, wheeled walker, cane. Prior Level of Function: independent with ADLs , independent with IADLs; ambulated with wheeled walker since surgery on 10/4/21; used cane PRN prior to surgery.     Pain Level: Pt did not rate pain during this session. Cognition: Alert and oriented. Functional Assessment:  AM-PAC Daily Activity Raw Score: 17/24    Initial Eval Status  Date: 10/5/21 Treatment Status  Date: 10/7/21  STGs = LTGs  Time frame: 10-14 days   Feeding Independent        Grooming Stand by Assist Setup seated. Limited stand tolerance for standing at the sink.   Independent    UB Dressing Supervision/Setup  Setup   Independent    LB Dressing Max Assist  Educated on hip precautions and potential adaptive equipment for LB ADLs. Could benefit from reinforcement.  pt instructed with adaptive equipment for LB dressing due to hip precautions. Fair demonstration of equipment noted. Modified [place]    Bathing Moderate Assist Long handled sponge issued.   Modified [place]    Toileting Minimal Assist    Modified [place]    Bed Mobility  Supine to sit: Minimal Assist     Mod A supine to sit   Supine to sit: Independent       Functional Transfers Contact Guard Assist   Sit <> stand from bed CGA from bed and chair.   Cues for hand placement to increase safety.   Independent    Functional Mobility Contact Guard Assist with ww  Short distance in room  CGA using w/w  Modified [place]    Balance Sitting:     Static:  independent    Dynamic: Min assist  Standing: CGA       Activity Tolerance Good with light activity Fair           Comments:  Pt instructed with hip precautions and use of adaptive equipment for LB ADL. Hip kit issued. Pt also provided with raman hose sleeve. She states she will have assist with raman hose upon return to home. Pt with no complaint of dizziness when standing briefly during ADL activity. Pt remained in chair to eat lunch. Education/treatment:  ADL retraining with facilitation of movement and instruction of adaptive equipment for self care skills. Therapeutic activity to address balance and endurance for ADL and transfers. Pt education of hip precautions, adaptive equipment use, walker safety, transfer safety, and home safety. · Pt has made  progress towards set goals.        Time In: 11:00   Time Out: 11:25     Min Units   Therapeutic Ex 50819     Therapeutic Activities 01342 75 2   ADL/Self Care 50348 15 1   Orthotic Management 10005     Neuro Re-Ed 99765     Non-Billable Time     TOTAL TIMED TREATMENT [facility] CED/L [id number]

## 2021-10-09 NOTE — PROGRESS NOTES
Physician Progress Note      PATIENT:               Candida Campos  CSN #:                  [id number]  :                       1950  ADMIT DATE:       10/4/2021 6:21 PM  [address] DATE:        10/7/2021 9:00 PM  RESPONDING  PROVIDER #:        FORREST Siddiqui MD          QUERY TEXT:    Dear Attending Physician,    Pt admitted with Syncopal episodes . ? Pt noted to have decreased H/H 10/4   7.7/22.7 ,10/5 6.5/20.9, 10/6 7.1/22.3 10/7 8.3/26.7  with orthostatic blood   pressures . S/P recent same day Rt hip replacement surgery at an outpt   facility. ?  If possible, please document in progress notes and discharge   summary the relationship, if any, between Syncope, ABLA ,orthostatic blood   pressure and carotid stenosis. The medical record reflects the following:  Risk Factors: chronic anemia, s/p rt hip replacement 10/4 dc same day  Clinical Indicators: Per PCP 10/5,\". [facility] Taveras outpatient total hip replacement   yesterday and was discharged home later in the day. Patient states she got up   to go to the bathroom when she started to feel lightheaded. Patient was   given IV fluids, was then able to ambulate and went home. At home, patient   continued to feel lightheaded and weak. Patient states she stood up, fell to   her knees, and passed out  . Pinky Angles \"   Per PCP 10/6,..US bilateral carotids: right   0-50% stenosis, left 50-69% stenosis-ECHO EF 65% . Darby Angles \"    Per PCP 10/7,\". .BP   stable + orthostatics despite blood transfusions and   Treatment: IVF, blood transfusions, PANFILO hose    Thank you,  Tacos Parker RN [facility]  Clinical Documentation Improvement Specialist  [phone]  Options provided:  -- Syncopal episodes due to ABLA  -- Syncopal episodes due to orthostatic blood pressure, Please state the cause   of orthostatic blood pressures. -- Syncopal episodes due to 0-50% right carotid stenosis, 50-69% left carotid   stenosis  -- Syncopal episodes due other, Please document the cause of Syncopal   episodes.   -- Other - I will add my own diagnosis  -- Disagree - Not applicable / Not valid  -- Disagree - Clinically unable to determine / Unknown  -- Refer to Clinical Documentation Reviewer    PROVIDER RESPONSE TEXT:    This patient has Syncopal episodes due to ABLA.     Query created by: Wilman Schumacher on 10/7/2021 2:47 PM      Electronically signed by:  FORREST Vilchis MD 10/9/2021 4:20 PM

## 2021-10-18 ENCOUNTER — APPOINTMENT (OUTPATIENT)
Dept: GENERAL RADIOLOGY | Age: 71
DRG: 177 | End: 2021-10-18
Payer: MEDICARE

## 2021-10-18 ENCOUNTER — APPOINTMENT (OUTPATIENT)
Dept: CT IMAGING | Age: 71
DRG: 177 | End: 2021-10-18
Payer: MEDICARE

## 2021-10-18 ENCOUNTER — HOSPITAL ENCOUNTER (INPATIENT)
Age: 71
LOS: 5 days | Discharge: NURSING, REHAB OR LONG-TERM CARE | DRG: 177 | End: 2021-10-23
Attending: EMERGENCY MEDICINE
Payer: MEDICARE

## 2021-10-18 DIAGNOSIS — D64.9 ANEMIA REQUIRING TRANSFUSIONS: Primary | ICD-10-CM
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
## 2021-10-18 PROBLEM — J18.9 PNEUMONIA: Status: ACTIVE | Noted: 2021-10-18

## 2021-10-18 PROBLEM — Z79.899 POLYPHARMACY: Status: ACTIVE | Noted: 2021-10-18

## 2021-10-18 LAB
ABO/RH: NORMAL
ADENOVIRUS BY PCR: NOT DETECTED
ALBUMIN SERPL-MCNC: 3 G/DL (ref 3.5–5.2)
ALP BLD-CCNC: 124 U/L (ref 35–104)
ALT SERPL-CCNC: 22 U/L (ref 0–32)
ANION GAP SERPL CALCULATED.3IONS-SCNC: 10 MMOL/L (ref 7–16)
ANTIBODY SCREEN: NORMAL
AST SERPL-CCNC: 40 U/L (ref 0–31)
ATYPICAL LYMPHOCYTE RELATIVE PERCENT: 6 % (ref 0–4)
BASOPHILS ABSOLUTE: 0.11 E9/L (ref 0–0.2)
BASOPHILS RELATIVE PERCENT: 3 % (ref 0–2)
BILIRUB SERPL-MCNC: 0.7 MG/DL (ref 0–1.2)
BLOOD BANK DISPENSE STATUS: NORMAL
BLOOD BANK DISPENSE STATUS: NORMAL
BLOOD BANK PRODUCT CODE: NORMAL
BLOOD BANK PRODUCT CODE: NORMAL
BORDETELLA PARAPERTUSSIS BY PCR: NOT DETECTED
BORDETELLA PERTUSSIS BY PCR: NOT DETECTED
BPU ID: NORMAL
BPU ID: NORMAL
BUN BLDV-MCNC: 18 MG/DL (ref 6–23)
C-REACTIVE PROTEIN: 13 MG/DL (ref 0–0.4)
CALCIUM SERPL-MCNC: 8.9 MG/DL (ref 8.6–10.2)
CHLAMYDOPHILIA PNEUMONIAE BY PCR: NOT DETECTED
CHLORIDE BLD-SCNC: 106 MMOL/L (ref 98–107)
CO2: 24 MMOL/L (ref 22–29)
CORONAVIRUS 229E BY PCR: NOT DETECTED
CORONAVIRUS HKU1 BY PCR: NOT DETECTED
CORONAVIRUS NL63 BY PCR: NOT DETECTED
CORONAVIRUS OC43 BY PCR: NOT DETECTED
CREAT SERPL-MCNC: 0.7 MG/DL (ref 0.5–1)
DESCRIPTION BLOOD BANK: NORMAL
DESCRIPTION BLOOD BANK: NORMAL
EOSINOPHILS ABSOLUTE: 0.07 E9/L (ref 0.05–0.5)
EOSINOPHILS RELATIVE PERCENT: 2 % (ref 0–6)
FERRITIN: 728 NG/ML
FOLATE: >20 NG/ML (ref 4.8–24.2)
GFR AFRICAN AMERICAN: >60
GFR NON-AFRICAN AMERICAN: >60 ML/MIN/1.73
GLUCOSE BLD-MCNC: 126 MG/DL (ref 74–99)
HCT VFR BLD CALC: 21.6 % (ref 34–48)
HCT VFR BLD CALC: 29.9 % (ref 34–48)
HEMOGLOBIN: 6.9 G/DL (ref 11.5–15.5)
HEMOGLOBIN: 9.7 G/DL (ref 11.5–15.5)
HUMAN METAPNEUMOVIRUS BY PCR: NOT DETECTED
HUMAN RHINOVIRUS/ENTEROVIRUS BY PCR: NOT DETECTED
IMMATURE RETIC FRACT: 21.6 % (ref 3–15.9)
INFLUENZA A BY PCR: NOT DETECTED
INFLUENZA B BY PCR: NOT DETECTED
IRON SATURATION: 29 % (ref 15–50)
IRON: 47 MCG/DL (ref 37–145)
LACTATE DEHYDROGENASE: 424 U/L (ref 135–214)
LYMPHOCYTES ABSOLUTE: 0.58 E9/L (ref 1.5–4)
LYMPHOCYTES RELATIVE PERCENT: 10 % (ref 20–42)
MCH RBC QN AUTO: 32.2 PG (ref 26–35)
MCHC RBC AUTO-ENTMCNC: 31.9 % (ref 32–34.5)
MCV RBC AUTO: 100.9 FL (ref 80–99.9)
MONOCYTES ABSOLUTE: 0.4 E9/L (ref 0.1–0.95)
MONOCYTES RELATIVE PERCENT: 11 % (ref 2–12)
MYCOPLASMA PNEUMONIAE BY PCR: NOT DETECTED
NEUTROPHILS ABSOLUTE: 2.34 E9/L (ref 1.8–7.3)
NEUTROPHILS RELATIVE PERCENT: 65 % (ref 43–80)
OVALOCYTES: ABNORMAL
PARAINFLUENZA VIRUS 1 BY PCR: NOT DETECTED
PARAINFLUENZA VIRUS 2 BY PCR: NOT DETECTED
PARAINFLUENZA VIRUS 3 BY PCR: NOT DETECTED
PARAINFLUENZA VIRUS 4 BY PCR: NOT DETECTED
PDW BLD-RTO: 14.6 FL (ref 11.5–15)
PLATELET # BLD: 225 E9/L (ref 130–450)
PMV BLD AUTO: 9.3 FL (ref 7–12)
POIKILOCYTES: ABNORMAL
POLYCHROMASIA: ABNORMAL
POTASSIUM REFLEX MAGNESIUM: 4.3 MMOL/L (ref 3.5–5)
PROCALCITONIN: 0.08 NG/ML (ref 0–0.08)
PROMYELOCYTES PERCENT: 3 % (ref 0–0)
RBC # BLD: 2.14 E12/L (ref 3.5–5.5)
RESPIRATORY SYNCYTIAL VIRUS BY PCR: NOT DETECTED
RETIC HGB EQUIVALENT: 28.3 PG (ref 28.2–36.6)
RETICULOCYTE ABSOLUTE COUNT: 0.07 E12/L
RETICULOCYTE COUNT PCT: 2.2 % (ref 0.4–1.9)
SARS-COV-2, PCR: DETECTED
SODIUM BLD-SCNC: 140 MMOL/L (ref 132–146)
TEAR DROP CELLS: ABNORMAL
TOTAL IRON BINDING CAPACITY: 164 MCG/DL (ref 250–450)
TOTAL PROTEIN: 6.1 G/DL (ref 6.4–8.3)
TOXIC GRANULATION: ABNORMAL
VITAMIN B-12: >2000 PG/ML (ref 211–946)
WBC # BLD: 3.6 E9/L (ref 4.5–11.5)

## 2021-10-18 PROCEDURE — 86923 COMPATIBILITY TEST ELECTRIC: CPT

## 2021-10-18 PROCEDURE — 2580000003 HC RX 258

## 2021-10-18 PROCEDURE — 85014 HEMATOCRIT: CPT

## 2021-10-18 PROCEDURE — 1200000000 HC SEMI PRIVATE

## 2021-10-18 PROCEDURE — 80053 COMPREHEN METABOLIC PANEL: CPT

## 2021-10-18 PROCEDURE — 73502 X-RAY EXAM HIP UNI 2-3 VIEWS: CPT

## 2021-10-18 PROCEDURE — 6360000002 HC RX W HCPCS: Performed by: INTERNAL MEDICINE

## 2021-10-18 PROCEDURE — 84145 PROCALCITONIN (PCT): CPT

## 2021-10-18 PROCEDURE — 82728 ASSAY OF FERRITIN: CPT

## 2021-10-18 PROCEDURE — 99285 EMERGENCY DEPT VISIT HI MDM: CPT

## 2021-10-18 PROCEDURE — 85045 AUTOMATED RETICULOCYTE COUNT: CPT

## 2021-10-18 PROCEDURE — 2580000003 HC RX 258: Performed by: INTERNAL MEDICINE

## 2021-10-18 PROCEDURE — 86140 C-REACTIVE PROTEIN: CPT

## 2021-10-18 PROCEDURE — 36415 COLL VENOUS BLD VENIPUNCTURE: CPT

## 2021-10-18 PROCEDURE — 83615 LACTATE (LD) (LDH) ENZYME: CPT

## 2021-10-18 PROCEDURE — 72131 CT LUMBAR SPINE W/O DYE: CPT

## 2021-10-18 PROCEDURE — 82746 ASSAY OF FOLIC ACID SERUM: CPT

## 2021-10-18 PROCEDURE — P9016 RBC LEUKOCYTES REDUCED: HCPCS

## 2021-10-18 PROCEDURE — 83550 IRON BINDING TEST: CPT

## 2021-10-18 PROCEDURE — 82607 VITAMIN B-12: CPT

## 2021-10-18 PROCEDURE — 36430 TRANSFUSION BLD/BLD COMPNT: CPT

## 2021-10-18 PROCEDURE — 0202U NFCT DS 22 TRGT SARS-COV-2: CPT

## 2021-10-18 PROCEDURE — 86850 RBC ANTIBODY SCREEN: CPT

## 2021-10-18 PROCEDURE — 85025 COMPLETE CBC W/AUTO DIFF WBC: CPT

## 2021-10-18 PROCEDURE — 72128 CT CHEST SPINE W/O DYE: CPT

## 2021-10-18 PROCEDURE — 86900 BLOOD TYPING SEROLOGIC ABO: CPT

## 2021-10-18 PROCEDURE — 86901 BLOOD TYPING SEROLOGIC RH(D): CPT

## 2021-10-18 PROCEDURE — 83540 ASSAY OF IRON: CPT

## 2021-10-18 PROCEDURE — 85018 HEMOGLOBIN: CPT

## 2021-10-18 PROCEDURE — 6370000000 HC RX 637 (ALT 250 FOR IP): Performed by: INTERNAL MEDICINE

## 2021-10-18 RX ORDER — SODIUM CHLORIDE 9 MG/ML
INJECTION, SOLUTION INTRAVENOUS PRN
Status: DISCONTINUED | OUTPATIENT
Start: 2021-10-18 | End: 2021-10-23 | Stop reason: HOSPADM

## 2021-10-18 RX ORDER — SODIUM CHLORIDE 0.9 % (FLUSH) 0.9 %
5-40 SYRINGE (ML) INJECTION PRN
Status: DISCONTINUED | OUTPATIENT
Start: 2021-10-18 | End: 2021-10-23 | Stop reason: HOSPADM

## 2021-10-18 RX ORDER — ALPRAZOLAM 0.25 MG/1
0.5 TABLET ORAL 3 TIMES DAILY PRN
Status: DISCONTINUED | OUTPATIENT
Start: 2021-10-18 | End: 2021-10-23 | Stop reason: HOSPADM

## 2021-10-18 RX ORDER — ACETAMINOPHEN 650 MG/1
650 SUPPOSITORY RECTAL EVERY 6 HOURS PRN
Status: DISCONTINUED | OUTPATIENT
Start: 2021-10-18 | End: 2021-10-23 | Stop reason: HOSPADM

## 2021-10-18 RX ORDER — ASPIRIN 325 MG
325 TABLET ORAL DAILY
COMMUNITY

## 2021-10-18 RX ORDER — ZINC SULFATE 50(220)MG
50 CAPSULE ORAL DAILY
Status: DISCONTINUED | OUTPATIENT
Start: 2021-10-19 | End: 2021-10-23 | Stop reason: HOSPADM

## 2021-10-18 RX ORDER — ACETAMINOPHEN 325 MG/1
650 TABLET ORAL EVERY 6 HOURS PRN
Status: DISCONTINUED | OUTPATIENT
Start: 2021-10-18 | End: 2021-10-23 | Stop reason: HOSPADM

## 2021-10-18 RX ORDER — ASCORBIC ACID 500 MG
500 TABLET ORAL DAILY
Status: DISCONTINUED | OUTPATIENT
Start: 2021-10-19 | End: 2021-10-23 | Stop reason: HOSPADM

## 2021-10-18 RX ORDER — 0.9 % SODIUM CHLORIDE 0.9 %
1000 INTRAVENOUS SOLUTION INTRAVENOUS ONCE
Status: COMPLETED | OUTPATIENT
Start: 2021-10-18 | End: 2021-10-18

## 2021-10-18 RX ORDER — TRAMADOL HYDROCHLORIDE 50 MG/1
50 TABLET ORAL EVERY 6 HOURS PRN
Status: DISCONTINUED | OUTPATIENT
Start: 2021-10-18 | End: 2021-10-23 | Stop reason: HOSPADM

## 2021-10-18 RX ORDER — TRAZODONE HYDROCHLORIDE 50 MG/1
100 TABLET ORAL NIGHTLY
Status: DISCONTINUED | OUTPATIENT
Start: 2021-10-18 | End: 2021-10-23 | Stop reason: HOSPADM

## 2021-10-18 RX ORDER — PALIPERIDONE 3 MG/1
3 TABLET, EXTENDED RELEASE ORAL EVERY MORNING
Status: DISCONTINUED | OUTPATIENT
Start: 2021-10-19 | End: 2021-10-23 | Stop reason: HOSPADM

## 2021-10-18 RX ORDER — ONDANSETRON 4 MG/1
4 TABLET, ORALLY DISINTEGRATING ORAL EVERY 8 HOURS PRN
Status: DISCONTINUED | OUTPATIENT
Start: 2021-10-18 | End: 2021-10-23 | Stop reason: HOSPADM

## 2021-10-18 RX ORDER — POLYETHYLENE GLYCOL 3350 17 G/17G
17 POWDER, FOR SOLUTION ORAL DAILY PRN
Status: DISCONTINUED | OUTPATIENT
Start: 2021-10-18 | End: 2021-10-23 | Stop reason: HOSPADM

## 2021-10-18 RX ORDER — SODIUM CHLORIDE 9 MG/ML
25 INJECTION, SOLUTION INTRAVENOUS PRN
Status: DISCONTINUED | OUTPATIENT
Start: 2021-10-18 | End: 2021-10-23 | Stop reason: HOSPADM

## 2021-10-18 RX ORDER — ONDANSETRON 2 MG/ML
4 INJECTION INTRAMUSCULAR; INTRAVENOUS EVERY 6 HOURS PRN
Status: DISCONTINUED | OUTPATIENT
Start: 2021-10-18 | End: 2021-10-23 | Stop reason: HOSPADM

## 2021-10-18 RX ORDER — SODIUM CHLORIDE 0.9 % (FLUSH) 0.9 %
5-40 SYRINGE (ML) INJECTION EVERY 12 HOURS SCHEDULED
Status: DISCONTINUED | OUTPATIENT
Start: 2021-10-18 | End: 2021-10-23 | Stop reason: HOSPADM

## 2021-10-18 RX ORDER — DOXYCYCLINE HYCLATE 100 MG/1
100 CAPSULE ORAL EVERY 12 HOURS SCHEDULED
Status: DISCONTINUED | OUTPATIENT
Start: 2021-10-18 | End: 2021-10-19

## 2021-10-18 RX ADMIN — ENOXAPARIN SODIUM 40 MG: 40 INJECTION SUBCUTANEOUS at 18:15

## 2021-10-18 RX ADMIN — TRAMADOL HYDROCHLORIDE 50 MG: 50 TABLET, COATED ORAL at 18:14

## 2021-10-18 RX ADMIN — TRAZODONE HYDROCHLORIDE 100 MG: 50 TABLET ORAL at 19:54

## 2021-10-18 RX ADMIN — DOXYCYCLINE HYCLATE 100 MG: 100 CAPSULE ORAL at 18:14

## 2021-10-18 RX ADMIN — ALPRAZOLAM 0.5 MG: 0.25 TABLET ORAL at 23:06

## 2021-10-18 RX ADMIN — SODIUM CHLORIDE 1000 ML: 9 INJECTION, SOLUTION INTRAVENOUS at 09:42

## 2021-10-18 RX ADMIN — Medication 10 ML: at 19:54

## 2021-10-18 RX ADMIN — CEFTRIAXONE 1000 MG: 1 INJECTION, POWDER, FOR SOLUTION INTRAMUSCULAR; INTRAVENOUS at 18:15

## 2021-10-18 ASSESSMENT — ENCOUNTER SYMPTOMS
ABDOMINAL PAIN: 0
VOMITING: 0
NAUSEA: 0
CHEST TIGHTNESS: 0
BLOOD IN STOOL: 0
DIARRHEA: 0
SHORTNESS OF BREATH: 0
WHEEZING: 0
CONSTIPATION: 0
COUGH: 0

## 2021-10-18 ASSESSMENT — PAIN SCALES - GENERAL
PAINLEVEL_OUTOF10: 0
PAINLEVEL_OUTOF10: 4
PAINLEVEL_OUTOF10: 6

## 2021-10-18 ASSESSMENT — PAIN DESCRIPTION - PAIN TYPE: TYPE: ACUTE PAIN

## 2021-10-18 ASSESSMENT — PAIN DESCRIPTION - ORIENTATION: ORIENTATION: RIGHT

## 2021-10-18 ASSESSMENT — PAIN DESCRIPTION - LOCATION: LOCATION: HIP

## 2021-10-18 ASSESSMENT — PAIN DESCRIPTION - DESCRIPTORS: DESCRIPTORS: DULL

## 2021-10-18 NOTE — ED PROVIDER NOTES
[facility] [address]      Pt Name: Ada Dos Santos  MRN: [id number]  [place] 1950  Date of evaluation: 10/18/2021      CHIEF COMPLAINT       Chief Complaint   Patient presents with    Fall        HPI  Ada Dos Santos is a 70 y.o. female status post right hip replacement, with past medical history of anemia of chronic disease, orthostatic hypotension and history of syncope episodes presents after fall this morning. Patient states that she was trying to get up from the bed, and she fell on her right hip, states the pain is about a 4 out of 10 on the right hip no radiation, no alleviating or aggravating factors. Denies any numbness or tingling, no neurological changes. denies any loss of consciousness, head trauma.  from aspirin patient is is not on any blood thinners. Denies any fever, chills, n/v, headache, weakness, cp, palpitations, sob, cough, abd pain, dysuria, hematuria, diarrhea, constipation. Except as noted above the remainder of the review of systems was reviewed and negative. Review of Systems   Constitutional: Negative for activity change, appetite change, chills, fatigue and fever. HENT: Negative for congestion, nosebleeds and tinnitus. Eyes: Negative for visual disturbance. Respiratory: Negative for cough, chest tightness, shortness of breath and wheezing. Cardiovascular: Negative for chest pain, palpitations and leg swelling. Gastrointestinal: Negative for abdominal pain, blood in stool, constipation, diarrhea, nausea and vomiting. Endocrine: Negative for polyphagia and polyuria. Genitourinary: Negative for dysuria, flank pain, hematuria, vaginal bleeding, vaginal discharge and vaginal pain. Musculoskeletal: Positive for arthralgias, gait problem and myalgias. Negative for back pain. Skin: Negative for rash. Neurological: Negative for dizziness, syncope, weakness, numbness and headaches.    Psychiatric/Behavioral: Negative for behavioral problems and confusion. Physical Exam  Constitutional:       General: She is not in acute distress. Appearance: Normal appearance. She is normal weight. She is not ill-appearing, toxic-appearing or diaphoretic. HENT:      Head: Normocephalic and atraumatic. Right Ear: External ear normal.      Left Ear: External ear normal.      Nose: Nose normal. No congestion or rhinorrhea. Mouth/Throat:      Mouth: Mucous membranes are moist.      Pharynx: Oropharynx is clear. No oropharyngeal exudate or posterior oropharyngeal erythema. Eyes:      Extraocular Movements: Extraocular movements intact. Conjunctiva/sclera: Conjunctivae normal.      Pupils: Pupils are equal, round, and reactive to light. Cardiovascular:      Rate and Rhythm: Normal rate and regular rhythm. Pulses: Normal pulses. Heart sounds: Normal heart sounds. Pulmonary:      Effort: Pulmonary effort is normal. No respiratory distress. Breath sounds: Normal breath sounds. Abdominal:      General: Abdomen is flat. Bowel sounds are normal. There is no distension. Palpations: Abdomen is soft. There is no mass. Tenderness: There is no abdominal tenderness. There is no right CVA tenderness, left CVA tenderness or guarding. Hernia: No hernia is present. Musculoskeletal:         General: No deformity. Cervical back: Normal range of motion. Tenderness present. No rigidity. Thoracic back: Tenderness present. Lumbar back: Tenderness present. Right lower leg: No edema. Left lower leg: No edema. Comments: She has chronic tenderness on her cervical spine due to previous surgery. Now complaining of mid spinal tenderness on the thoracic and lumbar area but no step-off deformities noted. No bruises, abrasions, lacerations noted. Lymphadenopathy:      Cervical: No cervical adenopathy. Skin:     General: Skin is warm and dry.       Capillary Refill: Capillary refill takes less than 2 seconds. Neurological:      General: No focal deficit present. Mental Status: She is alert and oriented to person, place, and time. Mental status is at baseline. Cranial Nerves: No cranial nerve deficit. Sensory: No sensory deficit. Motor: No weakness. Coordination: Coordination normal.      Gait: Gait normal.      Deep Tendon Reflexes: Reflexes normal.   Psychiatric:         Mood and Affect: Mood normal.         Behavior: Behavior normal.         Thought Content: Thought content normal.          Procedures     MDM   70 y.o. female status post right hip replacement, with past medical history of anemia of chronic disease, orthostatic hypotension and history of syncope episodes presents after fall this morning. Patient states that she was trying to get up from the bed, and she fell on her right hip, states the pain is about a 4 out of 10 on the right hip no radiation, no alleviating or aggravating factors. Physical exam remarkable for mid spinal  Tenderness on lumbar and thoracic spine with no step-off deformities. Imaging( CT head, thoracic, lumbar and XR hip) largely negative for any acute fractures or dislocations, or traumatic injuries. Ct chest remarkable for scattered, patchy consolidations in bilateral lungs may represent infectious  process. Labs remarkable for COVID +, Hemoglobin of 6.9, hct 21.6. Patient started on 2 unit transfusion. She tolerated transfusion well but still felt weak and not comfortable going home. Spoke to Dr. Jessenia Thomas for admission and he agrees with the plan. ED Course as of Oct 23 1434   Mon Oct 18, 2021   1433 Patient is tolerating transfusion well. Denying any pain at the moment    [TC]   1604 On reevaluation patient still feeling very weak, not comfortable going home.      [TC]   1605 Spoke to Dr. Jessenia Thomas he will admit patient,.    Zack Caro MD       --------------------------------------------- Rhinovirus/Enterovirus by PCR Not Detected Not Detected    Influenza A by PCR Not Detected Not Detected    Influenza B by PCR Not Detected Not Detected    Mycoplasma pneumoniae by PCR Not Detected Not Detected    Parainfluenza Virus 1 by PCR Not Detected Not Detected    Parainfluenza Virus 2 by PCR Not Detected Not Detected    Parainfluenza Virus 3 by PCR Not Detected Not Detected    Parainfluenza Virus 4 by PCR Not Detected Not Detected    Respiratory Syncytial Virus by PCR Not Detected Not Detected   CBC Auto Differential   Result Value Ref Range    WBC 3.6 (L) 4.5 - 11.5 E9/L    RBC 2.14 (L) 3.50 - 5.50 E12/L    Hemoglobin 6.9 (LL) 11.5 - 15.5 g/dL    Hematocrit 21.6 (L) 34.0 - 48.0 %    .9 (H) 80.0 - 99.9 fL    MCH 32.2 26.0 - 35.0 pg    MCHC 31.9 (L) 32.0 - 34.5 %    RDW 14.6 11.5 - 15.0 fL    Platelets 826 975 - 329 E9/L    MPV 9.3 7.0 - 12.0 fL    Neutrophils % 65.0 43.0 - 80.0 %    Lymphocytes % 10.0 (L) 20.0 - 42.0 %    Monocytes % 11.0 2.0 - 12.0 %    Eosinophils % 2.0 0.0 - 6.0 %    Basophils % 3.0 (H) 0.0 - 2.0 %    Neutrophils Absolute 2.34 1.80 - 7.30 E9/L    Lymphocytes Absolute 0.58 (L) 1.50 - 4.00 E9/L    Monocytes Absolute 0.40 0.10 - 0.95 E9/L    Eosinophils Absolute 0.07 0.05 - 0.50 E9/L    Basophils Absolute 0.11 0.00 - 0.20 E9/L    Atypical Lymphocytes Relative 6.0 (H) 0.0 - 4.0 %    Promyelocytes Percent 3.0 0 - 0 %    Toxic Granulation 1+     Polychromasia 1+     Poikilocytes 1+     Ovalocytes 1+     Tear Drop Cells 1+    Comprehensive Metabolic Panel w/ Reflex to MG   Result Value Ref Range    Sodium 140 132 - 146 mmol/L    Potassium reflex Magnesium 4.3 3.5 - 5.0 mmol/L    Chloride 106 98 - 107 mmol/L    CO2 24 22 - 29 mmol/L    Anion Gap 10 7 - 16 mmol/L    Glucose 126 (H) 74 - 99 mg/dL    BUN 18 6 - 23 mg/dL    CREATININE 0.7 0.5 - 1.0 mg/dL    GFR Non-African American >60 >=60 mL/min/1.73    GFR African American >60     Calcium 8.9 8.6 - 10.2 mg/dL    Total Protein 6.1 (L) 6.4 - 8.3 g/dL    Albumin 3.0 (L) 3.5 - 5.2 g/dL    Total Bilirubin 0.7 0.0 - 1.2 mg/dL    Alkaline Phosphatase 124 (H) 35 - 104 U/L    ALT 22 0 - 32 U/L    AST 40 (H) 0 - 31 U/L   C-reactive protein   Result Value Ref Range    CRP 13.0 (H) 0.0 - 0.4 mg/dL   Procalcitonin   Result Value Ref Range    Procalcitonin 0.08 0.00 - 0.08 ng/mL   FERRITIN   Result Value Ref Range    Ferritin 728 ng/mL   Iron and TIBC   Result Value Ref Range    Iron 47 37 - 145 mcg/dL    TIBC 164 (L) 250 - 450 mcg/dL    Iron Saturation 29 15 - 50 %   Lactate dehydrogenase   Result Value Ref Range     (H) 135 - 214 U/L   VITAMIN B12 & FOLATE   Result Value Ref Range    Vitamin B-12 >2000 (H) 211 - 946 pg/mL    Folate >20.0 4.8 - 24.2 ng/mL   Comprehensive Metabolic Panel w/ Reflex to MG   Result Value Ref Range    Sodium 140 132 - 146 mmol/L    Potassium reflex Magnesium 4.0 3.5 - 5.0 mmol/L    Chloride 108 (H) 98 - 107 mmol/L    CO2 23 22 - 29 mmol/L    Anion Gap 9 7 - 16 mmol/L    Glucose 106 (H) 74 - 99 mg/dL    BUN 14 6 - 23 mg/dL    CREATININE 0.7 0.5 - 1.0 mg/dL    GFR Non-African American >60 >=60 mL/min/1.73    GFR African American >60     Calcium 8.6 8.6 - 10.2 mg/dL    Total Protein 5.5 (L) 6.4 - 8.3 g/dL    Albumin 2.6 (L) 3.5 - 5.2 g/dL    Total Bilirubin 0.6 0.0 - 1.2 mg/dL    Alkaline Phosphatase 103 35 - 104 U/L    ALT 16 0 - 32 U/L    AST 26 0 - 31 U/L   CBC auto differential   Result Value Ref Range    WBC 3.1 (L) 4.5 - 11.5 E9/L    RBC 2.74 (L) 3.50 - 5.50 E12/L    Hemoglobin 8.5 (L) 11.5 - 15.5 g/dL    Hematocrit 26.4 (L) 34.0 - 48.0 %    MCV 96.4 80.0 - 99.9 fL    MCH 31.0 26.0 - 35.0 pg    MCHC 32.2 32.0 - 34.5 %    RDW 15.9 (H) 11.5 - 15.0 fL    Platelets 120 891 - 774 E9/L    MPV 9.5 7.0 - 12.0 fL    Neutrophils % 57.8 43.0 - 80.0 %    Lymphocytes % 21.1 20.0 - 42.0 %    Monocytes % 11.9 2.0 - 12.0 %    Eosinophils % 0.9 0.0 - 6.0 %    Basophils % 5.5 (H) 0.0 - 2.0 %    Neutrophils Absolute 1.80 1.80 - 7.30 E9/L Lymphocytes Absolute 0.71 (L) 1.50 - 4.00 E9/L    Monocytes Absolute 0.37 0.10 - 0.95 E9/L    Eosinophils Absolute 0.03 (L) 0.05 - 0.50 E9/L    Basophils Absolute 0.17 0.00 - 0.20 E9/L    Atypical Lymphocytes Relative 1.9 0.0 - 4.0 %    Promyelocytes Percent 0.9 0 - 0 %    nRBC 0.0 /100 WBC    Anisocytosis 1+     Polychromasia 1+     Poikilocytes 1+     Ovalocytes 1+    Hemoglobin and hematocrit, blood   Result Value Ref Range    Hemoglobin 9.7 (L) 11.5 - 15.5 g/dL    Hematocrit 29.9 (L) 34.0 - 48.0 %   Reticulocytes   Result Value Ref Range    Retic Ct Pct 2.2 (H) 0.4 - 1.9 %    Retic Ct Abs 0.069 E12/L    Immature Retic Fract 21.6 (H) 3.0 - 15.9 %    Retic HGB Equivalent 28.3 28.2 - 36.6 pg   CBC   Result Value Ref Range    WBC 3.2 (L) 4.5 - 11.5 E9/L    RBC 3.05 (L) 3.50 - 5.50 E12/L    Hemoglobin 9.7 (L) 11.5 - 15.5 g/dL    Hematocrit 29.7 (L) 34.0 - 48.0 %    MCV 97.4 80.0 - 99.9 fL    MCH 31.8 26.0 - 35.0 pg    MCHC 32.7 32.0 - 34.5 %    RDW 14.8 11.5 - 15.0 fL    Platelets 170 330 - 600 E9/L    MPV 10.0 7.0 - 12.0 fL   Basic metabolic panel   Result Value Ref Range    Sodium 139 132 - 146 mmol/L    Potassium 4.5 3.5 - 5.0 mmol/L    Chloride 105 98 - 107 mmol/L    CO2 22 22 - 29 mmol/L    Anion Gap 12 7 - 16 mmol/L    Glucose 140 (H) 74 - 99 mg/dL    BUN 22 6 - 23 mg/dL    CREATININE 0.7 0.5 - 1.0 mg/dL    GFR Non-African American >60 >=60 mL/min/1.73    GFR African American >60     Calcium 9.1 8.6 - 10.2 mg/dL   CBC   Result Value Ref Range    WBC 3.4 (L) 4.5 - 11.5 E9/L    RBC 2.84 (L) 3.50 - 5.50 E12/L    Hemoglobin 8.9 (L) 11.5 - 15.5 g/dL    Hematocrit 26.9 (L) 34.0 - 48.0 %    MCV 94.7 80.0 - 99.9 fL    MCH 31.3 26.0 - 35.0 pg    MCHC 33.1 32.0 - 34.5 %    RDW 14.5 11.5 - 15.0 fL    Platelets 413 509 - 858 E9/L    MPV 9.4 7.0 - 12.0 fL   Basic metabolic panel   Result Value Ref Range    Sodium 141 132 - 146 mmol/L    Potassium 4.2 3.5 - 5.0 mmol/L    Chloride 109 (H) 98 - 107 mmol/L    CO2 23 22 - 29 mmol/L    Anion Gap 9 7 - 16 mmol/L    Glucose 123 (H) 74 - 99 mg/dL    BUN 24 (H) 6 - 23 mg/dL    CREATININE 0.7 0.5 - 1.0 mg/dL    GFR Non-African American >60 >=60 mL/min/1.73    GFR African American >60     Calcium 8.8 8.6 - 10.2 mg/dL   CBC   Result Value Ref Range    WBC 3.5 (L) 4.5 - 11.5 E9/L    RBC 2.79 (L) 3.50 - 5.50 E12/L    Hemoglobin 8.7 (L) 11.5 - 15.5 g/dL    Hematocrit 27.4 (L) 34.0 - 48.0 %    MCV 98.2 80.0 - 99.9 fL    MCH 31.2 26.0 - 35.0 pg    MCHC 31.8 (L) 32.0 - 34.5 %    RDW 14.6 11.5 - 15.0 fL    Platelets 629 094 - 700 E9/L    MPV 9.5 7.0 - 12.0 fL   Basic metabolic panel   Result Value Ref Range    Sodium 140 132 - 146 mmol/L    Potassium 4.3 3.5 - 5.0 mmol/L    Chloride 107 98 - 107 mmol/L    CO2 23 22 - 29 mmol/L    Anion Gap 10 7 - 16 mmol/L    Glucose 90 74 - 99 mg/dL    BUN 24 (H) 6 - 23 mg/dL    CREATININE 0.7 0.5 - 1.0 mg/dL    GFR Non-African American >60 >=60 mL/min/1.73    GFR African American >60     Calcium 8.4 (L) 8.6 - 10.2 mg/dL   CBC   Result Value Ref Range    WBC 4.2 (L) 4.5 - 11.5 E9/L    RBC 3.20 (L) 3.50 - 5.50 E12/L    Hemoglobin 9.9 (L) 11.5 - 15.5 g/dL    Hematocrit 30.8 (L) 34.0 - 48.0 %    MCV 96.3 80.0 - 99.9 fL    MCH 30.9 26.0 - 35.0 pg    MCHC 32.1 32.0 - 34.5 %    RDW 14.4 11.5 - 15.0 fL    Platelets 838 368 - 849 E9/L    MPV 9.3 7.0 - 12.0 fL   Basic metabolic panel   Result Value Ref Range    Sodium 136 132 - 146 mmol/L    Potassium 3.9 3.5 - 5.0 mmol/L    Chloride 103 98 - 107 mmol/L    CO2 22 22 - 29 mmol/L    Anion Gap 11 7 - 16 mmol/L    Glucose 137 (H) 74 - 99 mg/dL    BUN 26 (H) 6 - 23 mg/dL    CREATININE 0.7 0.5 - 1.0 mg/dL    GFR Non-African American >60 >=60 mL/min/1.73    GFR African American >60     Calcium 9.0 8.6 - 10.2 mg/dL   POCT Glucose   Result Value Ref Range    Meter Glucose 142 (H) 74 - 99 mg/dL   TYPE AND SCREEN   Result Value Ref Range    ABO/Rh O POS     Antibody Screen NEG    PREPARE RBC (CROSSMATCH), 1 Units   Result Value Ref Range    Product Code Blood Bank [id number]     Description Blood Bank Red Blood Cells, Leuko-reduced     Unit Number [id number]     Dispense Status Blood Bank transfused    PREPARE RBC (CROSSMATCH), 1 Units   Result Value Ref Range    Product Code Blood Bank [id number]     Description Blood Bank Red Blood Cells, Leuko-reduced     Unit Number [id number]     Dispense Status Blood Bank transfused        RADIOLOGY:  CT HEAD WO CONTRAST   Final Result   No acute intracranial abnormality. CT CHEST WO CONTRAST   Final Result   Scattered, patchy consolidations in bilateral lungs may represent infectious   process. Reactive lymph node in the mediastinum. XR HIP 2-3 VW W PELVIS RIGHT   Final Result   Anatomically aligned right hip arthroplasty with no unexpected findings. CT LUMBAR SPINE WO CONTRAST   Final Result   1. No sign of acute traumatic injury. 2. Moderate mid and lower lumbar degenerative disc disease and spondylosis. See body of report for findings at specific levels. CT THORACIC SPINE WO CONTRAST   Final Result   No traumatic injuries in thoracic spine      Patchy opacities in bilateral lungs. Please refer to separate report from CT   chest for further details. ------------------------- NURSING NOTES AND VITALS REVIEWED ---------------------------  Date / Time Roomed:  10/18/2021  8:23 AM  ED Bed Assignment:  [id number]    The nursing notes within the ED encounter and vital signs as below have been reviewed.      Patient Vitals for the past 24 hrs:   BP Temp Temp src Pulse Resp SpO2 Weight   10/23/21 1007 (!) 150/70 98.3 °F (36.8 °C) Oral 60 16 95 % --   10/23/21 0026 -- -- -- -- -- -- 183 lb (83 kg)   10/22/21 2215 (!) 142/84 97.8 °F (36.6 °C) Oral 66 18 97 % --       Oxygen Saturation Interpretation: Normal    ------------------------------------------ PROGRESS NOTES ------------------------------------------  Re-evaluation(s):  Time: 2773  Patients symptoms are worsening  Repeat physical examination is not changed    Counseling:  I have spoken with the patient and discussed todays results, in addition to providing specific details for the plan of care and counseling regarding the diagnosis and prognosis. Their questions are answered at this time and they are agreeable with the plan of admission.    --------------------------------- ADDITIONAL PROVIDER NOTES ---------------------------------  Consultations:  Time: 4:04. Spoke with Dr. Slade Duaret. Discussed case. They will admit the patient. This patient's ED course included: a personal history and physicial examination, re-evaluation prior to disposition, multiple bedside re-evaluations, IV medications, cardiac monitoring, continuous pulse oximetry and complex medical decision making and emergency management    This patient has remained hemodynamically stable during their ED course. Diagnosis:  1. Anemia requiring transfusions    2. Fall, initial encounter        Disposition:  Patient's disposition: Admit to med/surg floor  Patient's condition is stable.          Pablo Mcclendon MD  Resident  10/23/21 3018

## 2021-10-18 NOTE — ED NOTES
Bed: 20  Expected date:   Expected time:   Means of arrival:   Comments:  Gus Ramirez, [address] [facility]  10/18/21 2110

## 2021-10-19 ENCOUNTER — APPOINTMENT (OUTPATIENT)
Dept: CT IMAGING | Age: 71
DRG: 177 | End: 2021-10-19
Payer: MEDICARE

## 2021-10-19 PROBLEM — U07.1 PNEUMONIA DUE TO COVID-19 VIRUS: Status: ACTIVE | Noted: 2021-10-18

## 2021-10-19 PROBLEM — D63.8 ANEMIA OF CHRONIC ILLNESS: Status: ACTIVE | Noted: 2017-01-02

## 2021-10-19 PROBLEM — J12.82 PNEUMONIA DUE TO COVID-19 VIRUS: Status: ACTIVE | Noted: 2021-10-18

## 2021-10-19 LAB
ALBUMIN SERPL-MCNC: 2.6 G/DL (ref 3.5–5.2)
ALP BLD-CCNC: 103 U/L (ref 35–104)
ALT SERPL-CCNC: 16 U/L (ref 0–32)
ANION GAP SERPL CALCULATED.3IONS-SCNC: 9 MMOL/L (ref 7–16)
ANISOCYTOSIS: ABNORMAL
AST SERPL-CCNC: 26 U/L (ref 0–31)
ATYPICAL LYMPHOCYTE RELATIVE PERCENT: 1.9 % (ref 0–4)
BASOPHILS ABSOLUTE: 0.17 E9/L (ref 0–0.2)
BASOPHILS RELATIVE PERCENT: 5.5 % (ref 0–2)
BILIRUB SERPL-MCNC: 0.6 MG/DL (ref 0–1.2)
BUN BLDV-MCNC: 14 MG/DL (ref 6–23)
CALCIUM SERPL-MCNC: 8.6 MG/DL (ref 8.6–10.2)
CHLORIDE BLD-SCNC: 108 MMOL/L (ref 98–107)
CO2: 23 MMOL/L (ref 22–29)
CREAT SERPL-MCNC: 0.7 MG/DL (ref 0.5–1)
EOSINOPHILS ABSOLUTE: 0.03 E9/L (ref 0.05–0.5)
EOSINOPHILS RELATIVE PERCENT: 0.9 % (ref 0–6)
GFR AFRICAN AMERICAN: >60
GFR NON-AFRICAN AMERICAN: >60 ML/MIN/1.73
GLUCOSE BLD-MCNC: 106 MG/DL (ref 74–99)
HCT VFR BLD CALC: 26.4 % (ref 34–48)
HEMOGLOBIN: 8.5 G/DL (ref 11.5–15.5)
LYMPHOCYTES ABSOLUTE: 0.71 E9/L (ref 1.5–4)
LYMPHOCYTES RELATIVE PERCENT: 21.1 % (ref 20–42)
MCH RBC QN AUTO: 31 PG (ref 26–35)
MCHC RBC AUTO-ENTMCNC: 32.2 % (ref 32–34.5)
MCV RBC AUTO: 96.4 FL (ref 80–99.9)
MONOCYTES ABSOLUTE: 0.37 E9/L (ref 0.1–0.95)
MONOCYTES RELATIVE PERCENT: 11.9 % (ref 2–12)
NEUTROPHILS ABSOLUTE: 1.8 E9/L (ref 1.8–7.3)
NEUTROPHILS RELATIVE PERCENT: 57.8 % (ref 43–80)
NUCLEATED RED BLOOD CELLS: 0 /100 WBC
OVALOCYTES: ABNORMAL
PDW BLD-RTO: 15.9 FL (ref 11.5–15)
PLATELET # BLD: 247 E9/L (ref 130–450)
PMV BLD AUTO: 9.5 FL (ref 7–12)
POIKILOCYTES: ABNORMAL
POLYCHROMASIA: ABNORMAL
POTASSIUM REFLEX MAGNESIUM: 4 MMOL/L (ref 3.5–5)
PROMYELOCYTES PERCENT: 0.9 % (ref 0–0)
RBC # BLD: 2.74 E12/L (ref 3.5–5.5)
SODIUM BLD-SCNC: 140 MMOL/L (ref 132–146)
TOTAL PROTEIN: 5.5 G/DL (ref 6.4–8.3)
WBC # BLD: 3.1 E9/L (ref 4.5–11.5)

## 2021-10-19 PROCEDURE — 97110 THERAPEUTIC EXERCISES: CPT

## 2021-10-19 PROCEDURE — 85025 COMPLETE CBC W/AUTO DIFF WBC: CPT

## 2021-10-19 PROCEDURE — 1200000000 HC SEMI PRIVATE

## 2021-10-19 PROCEDURE — 6360000002 HC RX W HCPCS: Performed by: INTERNAL MEDICINE

## 2021-10-19 PROCEDURE — 2580000003 HC RX 258: Performed by: INTERNAL MEDICINE

## 2021-10-19 PROCEDURE — 80053 COMPREHEN METABOLIC PANEL: CPT

## 2021-10-19 PROCEDURE — 97530 THERAPEUTIC ACTIVITIES: CPT

## 2021-10-19 PROCEDURE — 97161 PT EVAL LOW COMPLEX 20 MIN: CPT

## 2021-10-19 PROCEDURE — 6370000000 HC RX 637 (ALT 250 FOR IP): Performed by: INTERNAL MEDICINE

## 2021-10-19 PROCEDURE — 71250 CT THORAX DX C-: CPT

## 2021-10-19 PROCEDURE — 36415 COLL VENOUS BLD VENIPUNCTURE: CPT

## 2021-10-19 PROCEDURE — 6370000000 HC RX 637 (ALT 250 FOR IP): Performed by: NURSE PRACTITIONER

## 2021-10-19 RX ORDER — DEXAMETHASONE SODIUM PHOSPHATE 4 MG/ML
6 INJECTION, SOLUTION INTRA-ARTICULAR; INTRALESIONAL; INTRAMUSCULAR; INTRAVENOUS; SOFT TISSUE DAILY
Status: DISCONTINUED | OUTPATIENT
Start: 2021-10-19 | End: 2021-10-23 | Stop reason: HOSPADM

## 2021-10-19 RX ADMIN — PALIPERIDONE 3 MG: 3 TABLET, EXTENDED RELEASE ORAL at 09:08

## 2021-10-19 RX ADMIN — ALPRAZOLAM 0.5 MG: 0.25 TABLET ORAL at 21:01

## 2021-10-19 RX ADMIN — ZINC SULFATE 220 MG (50 MG) CAPSULE 50 MG: CAPSULE at 09:08

## 2021-10-19 RX ADMIN — Medication 10 ML: at 09:11

## 2021-10-19 RX ADMIN — ENOXAPARIN SODIUM 40 MG: 40 INJECTION SUBCUTANEOUS at 09:08

## 2021-10-19 RX ADMIN — OXYCODONE HYDROCHLORIDE AND ACETAMINOPHEN 500 MG: 500 TABLET ORAL at 09:08

## 2021-10-19 RX ADMIN — Medication 10 ML: at 20:57

## 2021-10-19 RX ADMIN — TRAMADOL HYDROCHLORIDE 50 MG: 50 TABLET, COATED ORAL at 14:10

## 2021-10-19 RX ADMIN — TRAZODONE HYDROCHLORIDE 100 MG: 50 TABLET ORAL at 21:00

## 2021-10-19 RX ADMIN — DEXAMETHASONE SODIUM PHOSPHATE 6 MG: 4 INJECTION, SOLUTION INTRAMUSCULAR; INTRAVENOUS at 09:09

## 2021-10-19 ASSESSMENT — PAIN SCALES - GENERAL
PAINLEVEL_OUTOF10: 6
PAINLEVEL_OUTOF10: 0

## 2021-10-19 NOTE — PROGRESS NOTES
Physical Therapy    Facility/Department: [facility], operated by [facility] MED SURG  Initial Assessment    NAME: Yumiko Gonzales  : 1950  MRN: [id number]    Date of Service: 10/19/2021      Attending Provider:  Gareth Arteaga MD    Evaluating PT:  Bessie Morton. Darby Hernandez, P.T. Room #:  6301/0546-J  Diagnosis:  Anemia requiring transfusions [D64.9], COVID 19+, fell at home 3x since 10/4/21  Pertinent PMHx/PSHx:  R ECHO 10/4/21  Precautions:  WBAT RLE, R posterolateral hip precautions, Droplet+, and falls    SUBJECTIVE:    Pt lives with son in an apt with 7 stairs and 1 rail to enter. Pt ambulated with ww since R ECHO. OBJECTIVE:   Initial Evaluation  Date: 10/19/21 Treatment Short Term/ Long Term   Goals   Was pt agreeable to Eval/treatment? yes     Does pt have pain? No c/o pain, just had pain medication prior to my arrival     Bed Mobility  Rolling: MIN A  Supine to sit: MOD A  Sit to supine: MOD A  Scooting: MOD A  Independent    Transfers Sit to stand: MIN A  Stand to sit: MIN A  Stand pivot: MIN A with ww  Independent   Ambulation   40 feet with ww MIN A  200 feet with ww supervision   Stair negotiation: ascended and descended NA, pt in droplet + isolation  TBA when pt is out of isolation with goal of 7 steps with 1 rail SBA   AM-PAC 6 Clicks 84/14       BLE ROM is WFL, R hip is [facility] allowed by hip precautions. LLE strength is grossly 4/5 to 4+/5 and RLE is grossly 3-/5 to 4-/5. Sensation:  Pt denies numbness and tingling to extremities  Balance: sitting is SBA and standing with ww is MIN A  Endurance: fair    Therapeutic Exercises:  Pt was instructed in/performed supine exercises with the RLE: ankle PF/DF with bed sheet 2x10 reps, heel slides and quad sets AAROM with bed sheet 2x10 reps, hip ABD/ADD AAROM 2x10 reps.     Patient education  Pt educated on above exs as HEP    Patient response to education:   Pt verbalized understanding Pt demonstrated skill Pt requires further education in this area   yes yes yes ASSESSMENT:    Conditions Requiring Skilled Therapeutic Intervention:    [x]Decreased strength     []Decreased ROM  [x]Decreased functional mobility  [x]Decreased balance   [x]Decreased endurance   []Decreased posture  []Decreased sensation  []Decreased coordination   []Decreased vision  []Decreased safety awareness   []Increased pain   Comments:  Pt has decreased strength and endurance limiting functional mobility at this time and increases her risk of falls. Pt performed above exs as above then sat on EOB and c/o mild light headedness. With time light headedness improved and pt walked with ww in the room. She walked with slow gait speed, step too pattern, and unsteady gait requiring MIN A for balance and safety. Pt c/o fatigue and light headedness after amb and was assisted back into bed. Upon lying down pt felt better. Treatment:  Patient practiced and was instructed in the following treatment:     Bed mobility, transfers, and gait with ww to improve functional strength, balance, and endurance.  Above exs to help maintain RLE ROM and decrease spasm and pain. Pt was left supine in bed with call light left by patient. Pt's/ family goals   1. To go home. Patient and or family understand(s) diagnosis, prognosis, and plan of care. PHYSICAL THERAPY PLAN OF CARE:    PT POC is established based on physician order and patient diagnosis     Referring provider/PT Order:  PT eval and treat  Diagnosis:  Anemia requiring transfusions [D64.9]  Specific instructions for next treatment:  Work on above exs and improve functional mobility.      Current Treatment Recommendations:     [x] Strengthening to improve independence with functional mobility   [x] ROM to improve ROM and decrease spasm and pain which will help promote independence with functional mobility   [x] Balance Training to improve static/dynamic balance and to reduce fall risk  [x] Endurance Training to improve activity tolerance during functional mobility   [x] Transfer Training to improve safety and independence with all functional transfers   [x] Gait Training to improve gait mechanics, endurance and assess need for appropriate assistive device  [x] Stair Training in preparation for safe discharge home and/or into the community   [] Positioning to prevent skin breakdown and contractures  [] Safety and Education Training   [x] Patient/Caregiver Education   [] HEP  [] Other     PT long term treatment goals are located in above grid    Frequency of treatments: 2-5x/week x 1-2 weeks. Time in  14:25  Time out  15:00    Total Treatment Time  25 minutes     Evaluation Time includes thorough review of current medical information, gathering information on past medical history/social history and prior level of function, completion of standardized testing/informal observation of tasks, assessment of data and education on plan of care and goals. CPT codes:  [x] Low Complexity PT evaluation 00376  [] Moderate Complexity PT evaluation 57117  [] High Complexity PT evaluation 83658  [] PT Re-evaluation 02610  [] Gait training 01011 ** minutes  [] Manual therapy 38621 ** minutes  [x] Therapeutic activities 48604 15 minutes  [x] Therapeutic exercises 61334 10 minutes  [] Neuromuscular reeducation 92208 ** minutes     Bruce Lackey., P.T.   License Number: [id number]

## 2021-10-19 NOTE — ACP (ADVANCE CARE PLANNING)
Advance Care Planning     Advance Care Planning Activator (Inpatient)  Conversation Note      Date of ACP Conversation: 10/19/2021     Conversation Conducted with: Joan Weiss    ACP Activator: Jacquelyn Roberto RN      Health Care Decision Maker:     Current Designated Health Care Decision Maker:     Primary Decision Maker: Jackson Vega Child - [phone]    Secondary Decision Maker: Austen Bauman - Brother/Sister - [phone]  Click here to complete Healthcare Decision Makers including section of the Healthcare Decision Maker Relationship (ie \"Primary\")    Care Preferences    Ventilation: \"If you were in your present state of health and suddenly became very ill and were unable to breathe on your own, what would your preference be about the use of a ventilator (breathing machine) if it were available to you? \"      Would the patient desire the use of ventilator (breathing machine)?: yes    \"If your health worsens and it becomes clear that your chance of recovery is unlikely, what would your preference be about the use of a ventilator (breathing machine) if it were available to you? \"     Would the patient desire the use of ventilator (breathing machine)?: no      Resuscitation  \"CPR works best to restart the heart when there is a sudden event, like a heart attack, in someone who is otherwise healthy. Unfortunately, CPR does not typically restart the heart for people who have serious health conditions or who are very sick. \"    \"In the event your heart stopped as a result of an underlying serious health condition, would you want attempts to be made to restart your heart (answer \"yes\" for attempt to resuscitate) or would you prefer a natural death (answer \"no\" for do not attempt to resuscitate)? \" yes       [] Yes   [x] No   Educated Patient / Teresa Freeman regarding differences between Advance Directives and portable DNR orders.     Length of ACP Conversation in minutes:  10 minutes  Conversation Outcomes:  [x] ACP discussion completed  [] Existing advance directive reviewed with patient; no changes to patient's previously recorded wishes  [] New Advance Directive completed  [] Portable Do Not Rescitate prepared for Provider review and signature  [] POLST/POST/MOLST/MOST prepared for Provider review and signature      Follow-up plan:    [] Schedule follow-up conversation to continue planning  [x] Referred individual to Provider for additional questions/concerns   [] Advised patient/agent/surrogate to review completed ACP document and update if needed with changes in condition, patient preferences or care setting    [] This note routed to one or more involved healthcare providers

## 2021-10-19 NOTE — H&P
[address] [facility]  History and Physical      CHIEF COMPLAINT:    Chief Complaint   Patient presents with    Fall        Patient of Selma Neumann DO presents with:  Pneumonia due to COVID-19 virus    History of Present Illness:   Patient is a 28-year-old female with a past medical history of cervical CA, anxiety, anemia, dyslipidemia, IBS, and syncope and collapse. Patient presented to the ED with complaints of a fall at home due to weakness. Patient was recently discharged 2 weeks ago she had a right hip replacement. Patient denies hitting her head and denies any other injuries. Patient states this is never happened in the past.  She is on a full dose aspirin daily for DVT prophylaxis presumably. She indicates her hemoglobin \"kept dropping down low\". She denies any hematemesis hematochezia or hematuria. Patient is alert and oriented on examination. Patient denies any chest pain, shortness of breath, fever, chills, headache, and abdominal pain. Patient is vaccinated for COVID-19 however she did test positive. Patient appears to be asymptomatic for Covid. ER work-up revealed hemoglobin 6.9. Patient admitted to [place] [address] unit for further testing and treatment. REVIEW OF SYSTEMS:  Pertinent negatives are above in HPI. 10 point ROS otherwise negative. Past Medical History:   Diagnosis Date    Anemia     Anxiety     With panic attacks.  Cancer [facility])     Cervical with radiation implant; history of hysterctomy due to CA, 1995.  Depression     Major depressive disorder with psychotic features.     Dyslipidemia     Dysrhythmia     High triglycerides     patient denies    IBS (irritable bowel syndrome)     Kidney stones     Muscle spasm     Neck injury 1992    Syncope and collapse          Past Surgical History:   Procedure Laterality Date    BONE MARROW BIOPSY  09/12/2016    left buttocks area    CATARACT REMOVAL Bilateral     CHOLECYSTECTOMY  12/23/2013    COLONOSCOPY      HYSTERECTOMY      NECK SURGERY      x 3    OTHER SURGICAL HISTORY Left 12/12/2013    CYSTOSCOPY-RETROGRADE;LASER LITHOTRIPSY;LEFT J-STENT    CO CYSTO/URETERO/PYELOSCOPY W/LITHOTRIPSY Left 4/19/2018    CYSTOSCOPY RETROGRADE PYELOGRAM LASER LITHOTRIPSY  LEFT J-MARGE stent insertion performed by Brooke Terrell MD at [address] ENDOSCOPY         Medications Prior to Admission:    Medications Prior to Admission: aspirin 325 MG tablet, Take 325 mg by mouth daily  Multiple Vitamins-Minerals (CENTRUM MULTIGUMMIES) CHEW, Take 2 each by mouth daily  VORTIoxetine HBr (TRINTELLIX) 20 MG TABS tablet, Take 20 mg by mouth daily   paliperidone (INVEGA) 3 MG ER tablet, Take 3 mg by mouth every morning  traMADol (ULTRAM) 50 MG tablet, Take 50 mg by mouth every 6 hours as needed. traZODone (DESYREL) 100 MG tablet, Take 1 tablet by mouth nightly. ALPRAZolam (XANAX) 0.5 MG tablet, Take 0.5 mg by mouth 3 times daily as needed. Note that the patient's home medications were reviewed and the above list is accurate to the best of my knowledge at the time of the exam.    Allergies:    Patient has no known allergies. Social History:    reports that she has never smoked. She has never used smokeless tobacco. She reports that she does not drink alcohol and does not use drugs. Family History:   family history includes Heart Disease in her father; Marcene M[place] in her sister; Other in her mother.       PHYSICAL EXAM:    Vitals:  /71   Pulse 70   Temp 99.2 °F (37.3 °C) (Oral)   Resp 18   Ht 5' (1.524 m)   Wt 187 lb (84.8 kg)   SpO2 96%   BMI 36.52 kg/m²       General appearance: NAD, conversant, fatigued, pale  Eyes: Sclerae anicteric, PERRLA  HEENT: AT/NC, MMM  Neck: FROM, supple, no thyromegaly  Lymph: No cervical / supraclavicular lymphadenopathy  Lungs: Clear to auscultation, WOB normal  CV: RRR, no MRGs,   Abdomen: Soft, non-tender; no masses or HSM, +BS  Extremities: FROM without synovitis. No clubbing or cyanosis of the hands. Right hip dressing red and inflamed  Skin: no rash, induration, lesions, or ulcers  Psych: Calm and cooperative. Normal judgement and insight. Normal mood and affect. Neuro: Alert and interactive, face symmetric, speech fluent. LABS:  All labs reviewed. Of note:  CBC with Differential:    Lab Results   Component Value Date    WBC 3.1 10/19/2021    RBC 2.74 10/19/2021    HGB 8.5 10/19/2021    HCT 26.4 10/19/2021     10/19/2021    MCV 96.4 10/19/2021    MCH 31.0 10/19/2021    MCHC 32.2 10/19/2021    RDW 15.9 10/19/2021    NRBC 0.0 10/19/2021    LYMPHOPCT 21.1 10/19/2021    PROMYELOPCT 0.9 10/19/2021    MONOPCT 11.9 10/19/2021    BASOPCT 5.5 10/19/2021    MONOSABS 0.37 10/19/2021    LYMPHSABS 0.71 10/19/2021    EOSABS 0.03 10/19/2021    BASOSABS 0.17 10/19/2021     CMP:    Lab Results   Component Value Date     10/19/2021    K 4.0 10/19/2021     10/19/2021    CO2 23 10/19/2021    BUN 14 10/19/2021    CREATININE 0.7 10/19/2021    GFRAA >60 10/19/2021    LABGLOM >60 10/19/2021    GLUCOSE 106 10/19/2021    GLUCOSE 118 11/12/2010    PROT 5.5 10/19/2021    LABALBU 2.6 10/19/2021    LABALBU 4.2 11/12/2010    CALCIUM 8.6 10/19/2021    BILITOT 0.6 10/19/2021    ALKPHOS 103 10/19/2021    AST 26 10/19/2021    ALT 16 10/19/2021       Imaging:  CT thoracic spine: No traumatic injuries in thoracic spine. CT lumbar spine: No sign of acute traumatic injury. Moderate mid and lower lumbar degenerative disc disease and spondylosis. CT chest: Scattered patchy consolidations in bilateral lungs may represent infectious process. Reactive lymph node in the mediastinum. EKG:  NSR    Telemetry:  NSR    ASSESSMENT/PLAN:  Principal Problem:    Pneumonia due to COVID-19 virus  Active Problems:    Anemia of chronic illness    Anemia requiring transfusions    Polypharmacy  Resolved Problems:    * No resolved hospital problems. *    30-year-old female recently discharged with a right hip replacement admitted to telemetry unit with    Covid  -does not meet criteria for remdesivir  -Decadron 6 mg daily  -Vitamin D, zinc, vitamin C  -Monitor O2 saturations and supplement oxygen to keep saturations greater than 93%, wean as necessary, incentive spirometer, no nebulizers  -continue to monitor  oxygenation, check ambulating pulse ox in preparation for discharge  -Droplet plus isolation  -inflammatory marker is elevated as a manifestation of Covid    Anemia  -Monitor H&H and transfuse to keep hgb greater than 7.  Hct/Hgb on admission 21.6/6.9 transfuse 1 unit continue to monitor  -Patient follows with Dr. Zachary Hernandez for chronic anemia  -?consult to oncology-we will hold off for now-patient may follow-up with hematology oncology as an outpatient, unless she has recurrent drop again tomorrow    Medication for other comorbidities continue as appropriate dose adjustment as necessary. DVT prophylaxis  PT OT  Discharge planning  Case discussed with attending and agreed upon plan of care. Code status:Full  Requires inpatient level of care  BETTY Benjamin - CNP    3:02 PM     Above note edited to reflect my thoughts     I personally saw, examined and provided care for the patient. Radiographs, labs and medication list were reviewed by me independently. The case was discussed in detail and plans for care were established. Review of [address] [place] [address], documentation was conducted and revisions were made as appropriate directly by me. I agree with the above documented exam, problem list, and plan of care.      Sheryl Ortiz MD  8:51 PM  10/19/2021    10/19/2021

## 2021-10-19 NOTE — CARE COORDINATION
COVID + 10/18. Phone conversation w/ patient. Explained role of  and plan of care. Lives w/ her son in an apartment- 7 steps to entrance. Hx R ECHO 10/7- uses [employer], shower chair. Active w/ [facility]. PCP is Dr. Kota Rodriguez and pharmacy is [facility]. Per pt, plan is to go to snf on discharge- PT/OT evals pending- pt is requesting [facility] however, due to [facility], the only facility that patient could go to pending bed availability is [facility] - referral made to JOS[facility] Lowe response- will need insurance precert. Daughter David Somers [phone] updated on discharge plan. Will follow Natali Carias RN case manager     The Plan for Transition of Care is related to the following treatment goals: physical conditioning    The Patient and/or patient representative Nishi Nichols was provided with a choice of provider and agrees   with the discharge plan. [x] Yes [] No    Freedom of choice list was provided with basic dialogue that supports the patient's individualized plan of care/goals, treatment preferences and shares the quality data associated with the providers.  [x] Yes [] No

## 2021-10-19 NOTE — PROGRESS NOTES
Call placed to clinical manager and Denver Plump with Dr[facility] group to notify of positive covid test.    Electronically signed by Tasneem Jacobs RN on 10/18/2021 at 10:37 PM

## 2021-10-20 LAB
ANION GAP SERPL CALCULATED.3IONS-SCNC: 12 MMOL/L (ref 7–16)
BUN BLDV-MCNC: 22 MG/DL (ref 6–23)
CALCIUM SERPL-MCNC: 9.1 MG/DL (ref 8.6–10.2)
CHLORIDE BLD-SCNC: 105 MMOL/L (ref 98–107)
CO2: 22 MMOL/L (ref 22–29)
CREAT SERPL-MCNC: 0.7 MG/DL (ref 0.5–1)
GFR AFRICAN AMERICAN: >60
GFR NON-AFRICAN AMERICAN: >60 ML/MIN/1.73
GLUCOSE BLD-MCNC: 140 MG/DL (ref 74–99)
HCT VFR BLD CALC: 29.7 % (ref 34–48)
HEMOGLOBIN: 9.7 G/DL (ref 11.5–15.5)
MCH RBC QN AUTO: 31.8 PG (ref 26–35)
MCHC RBC AUTO-ENTMCNC: 32.7 % (ref 32–34.5)
MCV RBC AUTO: 97.4 FL (ref 80–99.9)
PDW BLD-RTO: 14.8 FL (ref 11.5–15)
PLATELET # BLD: 243 E9/L (ref 130–450)
PMV BLD AUTO: 10 FL (ref 7–12)
POTASSIUM SERPL-SCNC: 4.5 MMOL/L (ref 3.5–5)
RBC # BLD: 3.05 E12/L (ref 3.5–5.5)
SODIUM BLD-SCNC: 139 MMOL/L (ref 132–146)
WBC # BLD: 3.2 E9/L (ref 4.5–11.5)

## 2021-10-20 PROCEDURE — 6370000000 HC RX 637 (ALT 250 FOR IP): Performed by: INTERNAL MEDICINE

## 2021-10-20 PROCEDURE — 6370000000 HC RX 637 (ALT 250 FOR IP): Performed by: NURSE PRACTITIONER

## 2021-10-20 PROCEDURE — 2580000003 HC RX 258: Performed by: INTERNAL MEDICINE

## 2021-10-20 PROCEDURE — 97110 THERAPEUTIC EXERCISES: CPT

## 2021-10-20 PROCEDURE — 97165 OT EVAL LOW COMPLEX 30 MIN: CPT

## 2021-10-20 PROCEDURE — 1200000000 HC SEMI PRIVATE

## 2021-10-20 PROCEDURE — 85027 COMPLETE CBC AUTOMATED: CPT

## 2021-10-20 PROCEDURE — 80048 BASIC METABOLIC PNL TOTAL CA: CPT

## 2021-10-20 PROCEDURE — 36415 COLL VENOUS BLD VENIPUNCTURE: CPT

## 2021-10-20 PROCEDURE — 97530 THERAPEUTIC ACTIVITIES: CPT

## 2021-10-20 PROCEDURE — 6360000002 HC RX W HCPCS: Performed by: INTERNAL MEDICINE

## 2021-10-20 RX ADMIN — Medication 10 ML: at 22:08

## 2021-10-20 RX ADMIN — ENOXAPARIN SODIUM 40 MG: 40 INJECTION SUBCUTANEOUS at 10:39

## 2021-10-20 RX ADMIN — ZINC SULFATE 220 MG (50 MG) CAPSULE 50 MG: CAPSULE at 10:39

## 2021-10-20 RX ADMIN — ALPRAZOLAM 0.5 MG: 0.25 TABLET ORAL at 10:44

## 2021-10-20 RX ADMIN — ALPRAZOLAM 0.5 MG: 0.25 TABLET ORAL at 22:07

## 2021-10-20 RX ADMIN — Medication 10 ML: at 10:43

## 2021-10-20 RX ADMIN — DEXAMETHASONE SODIUM PHOSPHATE 6 MG: 4 INJECTION, SOLUTION INTRAMUSCULAR; INTRAVENOUS at 10:38

## 2021-10-20 RX ADMIN — TRAZODONE HYDROCHLORIDE 100 MG: 50 TABLET ORAL at 22:07

## 2021-10-20 RX ADMIN — OXYCODONE HYDROCHLORIDE AND ACETAMINOPHEN 500 MG: 500 TABLET ORAL at 10:39

## 2021-10-20 RX ADMIN — PALIPERIDONE 3 MG: 3 TABLET, EXTENDED RELEASE ORAL at 10:38

## 2021-10-20 ASSESSMENT — PAIN SCALES - GENERAL
PAINLEVEL_OUTOF10: 0
PAINLEVEL_OUTOF10: 0

## 2021-10-20 NOTE — PROGRESS NOTES
[facility] Quality Flow/Interdisciplinary Rounds Progress Note        Quality Flow Rounds held on October 20, 2021    Disciplines Attending:  Bedside Nurse, ,  and Nursing Unit Leadership    Hiram Macedo was admitted on 10/18/2021  8:23 AM    Anticipated Discharge Date:  Expected Discharge Date: 10/22/21    Disposition:    Robby Score:  Robby Scale Score: 19    Readmission Risk              Risk of Unplanned Readmission:  17           Discussed patient goal for the day, patient clinical progression, and barriers to discharge.   The following Goal(s) of the Day/Commitment(s) have been identified:  Diagnostics - Report Results and Labs - Report Results      Margo Ayala RN  October 20, 2021

## 2021-10-20 NOTE — PROGRESS NOTES
Subjective: The patient is awake and alert. Resting in bed without any complaints  No acute events overnight. Denies chest pain, angina, SOB     Objective:    /62   Pulse 58   Temp 96.1 °F (35.6 °C) (Oral)   Resp 16   Ht 5' (1.524 m)   Wt 186 lb (84.4 kg)   SpO2 98%   BMI 36.33 kg/m²     In: 360 [P.O.:360]  Out: 500   In: 360   Out: 500 [Urine:500]    General appearance: NAD, conversant, fatigued  HEENT: AT/NC, MMM  Neck: FROM, supple  Lungs: Clear to auscultation  CV: RRR, no MRGs  Vasc: Radial pulses 2+  Abdomen: Soft, non-tender; no masses or HSM  Extremities: No peripheral edema or digital cyanosis  Skin: no rash, lesions or ulcers  Psych: Alert and oriented to person, place and time  Neuro: Alert and interactive     Recent Labs     10/18/21  0850 10/18/21  1845 10/19/21  0905   WBC 3.6*  --  3.1*   HGB 6.9* 9.7* 8.5*   HCT 21.6* 29.9* 26.4*     --  247       Recent Labs     10/18/21  0850 10/19/21  0905    140   K 4.3 4.0    108*   CO2 24 23   BUN 18 14   CREATININE 0.7 0.7   CALCIUM 8.9 8.6       Assessment:    Principal Problem:    Pneumonia due to COVID-19 virus  Active Problems:    Anemia of chronic illness    Anemia requiring transfusions    Polypharmacy  Resolved Problems:    * No resolved hospital problems.  *      Plan:  70-year-old female recently discharged with a right hip replacement admitted to telemetry unit with     Covid  -does not meet criteria for remdesivir  -Decadron 6 mg daily  -Vitamin D, zinc, vitamin C  -Monitor O2 saturations and supplement oxygen to keep saturations greater than 93%, wean as necessary, incentive spirometer, no nebulizers  -continue to monitor  oxygenation, check ambulating pulse ox in preparation for discharge  -Droplet plus isolation  -inflammatory marker is elevated as a manifestation of Covid     Anemia  -Monitor H&H and transfuse to keep hgb greater than 7.  Hct/Hgb on admission 21.6/6.9 transfuse 1 unit continue to monitor hgb

## 2021-10-20 NOTE — PLAN OF CARE
Problem: Pain:  Goal: Pain level will decrease  Description: Pain level will decrease  Outcome: Met This Shift     Problem: Falls - Risk of:  Goal: Will remain free from falls  Description: Will remain free from falls  Outcome: Met This Shift     Problem: Gas Exchange - Impaired  Goal: Absence of hypoxia  Outcome: Met This Shift

## 2021-10-20 NOTE — CONSULTS
[facility]  Hematology/Oncology  Consult      Patient Name: Nida Apple  YOB: 1950  PCP: Michela Lee DO   Referring Provider:      Reason for Consultation:   Chief Complaint   Patient presents with    Fall         History of Present Illness: This is a 58-year-old patient of Dr. Lillian Cline following for low grade MDS. She receives intermittent ANIA for her anemia. She also has a PMH of cervical ca, IBS, anxiety, and dyslipidemia. She presented to the ED for evaluation of weakness with falling at home. She recently had a R hip replacement and was discharged 2 weeks ago. Upon evaluation her hgb 6.9 Today's CBC with hgb 8.5. WBC 3.1 CT lumbar spine no injury but degenerative disease and spondylosis. CT thoracic spine with no injury but patchy opacities in BL lungs. CT A/P showed patchy consolidations in BL lungs with reactive LN in the mediastinum. Ferritin 728, iron 47, iron sat 29, tibc 164, folate >20, B12>2000. Immature retic fract 21.6, retic ct pct 2.2. Consultation for evaluation of anemia. Diagnostic Data:     Past Medical History:   Diagnosis Date    Anemia     Anxiety     With panic attacks.  Cancer [facility])     Cervical with radiation implant; history of hysterctomy due to CA, 1995.  Depression     Major depressive disorder with psychotic features.     Dyslipidemia     Dysrhythmia     High triglycerides     patient denies    IBS (irritable bowel syndrome)     Kidney stones     Muscle spasm     Neck injury 1992    Syncope and collapse        Patient Active Problem List    Diagnosis Date Noted    Anemia requiring transfusions 10/18/2021    Pneumonia due to COVID-19 virus 10/18/2021    Polypharmacy 10/18/2021    Syncope and collapse 10/05/2021    Mixed hyperlipidemia 01/02/2017    Anemia of chronic illness 01/02/2017    Orthostatic hypotension     Syncope 01/01/2017    Anxiety     Major depression 03/29/2014    Fibromyalgia     IBS (irritable bowel syndrome)         Past Surgical History:   Procedure Laterality Date    BONE MARROW BIOPSY  09/12/2016    left buttocks area    CATARACT REMOVAL Bilateral     CHOLECYSTECTOMY  12/23/2013    COLONOSCOPY      HYSTERECTOMY      NECK SURGERY      x 3    OTHER SURGICAL HISTORY Left 12/12/2013    CYSTOSCOPY-RETROGRADE;LASER LITHOTRIPSY;LEFT J-STENT    MO CYSTO/URETERO/PYELOSCOPY W/LITHOTRIPSY Left 4/19/2018    CYSTOSCOPY RETROGRADE PYELOGRAM LASER LITHOTRIPSY  LEFT J-MARGE stent insertion performed by Cecily Gutierrez MD at [address] [place] [address] ENDOSCOPY         Family History  Family History   Problem Relation Age of Onset    Other Mother         heart and lung disease    Heart Disease Father     Lung Cancer Sister        Social History    TOBACCO:   reports that she has never smoked. She has never used smokeless tobacco.  ETOH:   reports no history of alcohol use. Home Medications  Prior to Admission medications    Medication Sig Start Date End Date Taking? Authorizing Provider   aspirin 325 MG tablet Take 325 mg by mouth daily   Yes Historical Provider, MD   Multiple Vitamins-Minerals (CENTRUM MULTIGUMMIES) CHEW Take 2 each by mouth daily   Yes Historical Provider, MD   VORTIoxetine HBr (TRINTELLIX) 20 MG TABS tablet Take 20 mg by mouth daily    Yes Historical Provider, MD   paliperidone (INVEGA) 3 MG ER tablet Take 3 mg by mouth every morning   Yes Historical Provider, MD   traMADol (ULTRAM) 50 MG tablet Take 50 mg by mouth every 6 hours as needed. Yes Historical Provider, MD   traZODone (DESYREL) 100 MG tablet Take 1 tablet by mouth nightly. 4/1/14  Yes Jai Clancy MD   ALPRAZolam Jane Sos) 0.5 MG tablet Take 0.5 mg by mouth 3 times daily as needed. Yes Historical Provider, MD       Allergies  No Known Allergies    Review of Systems: Talked with patient over phone.  Fatigued, R hip pain related to recent replacement, lightheadedness intermittently especially with exertion, mild infrequent cough         Objective  /62   Pulse 58   Temp 96.1 °F (35.6 °C) (Oral)   Resp 16   Ht 5' (1.524 m)   Wt 186 lb (84.4 kg)   SpO2 98%   BMI 36.33 kg/m²     Physical Exam:   Performance Status:  Not preformed due to covid-19 .     Recent Laboratory Data-   Lab Results   Component Value Date    WBC 3.1 (L) 10/19/2021    HGB 8.5 (L) 10/19/2021    HCT 26.4 (L) 10/19/2021    MCV 96.4 10/19/2021     10/19/2021    LYMPHOPCT 21.1 10/19/2021    RBC 2.74 (L) 10/19/2021    MCH 31.0 10/19/2021    MCHC 32.2 10/19/2021    RDW 15.9 (H) 10/19/2021    NEUTOPHILPCT 57.8 10/19/2021    MONOPCT 11.9 10/19/2021    BASOPCT 5.5 (H) 10/19/2021    NEUTROABS 1.80 10/19/2021    LYMPHSABS 0.71 (L) 10/19/2021    MONOSABS 0.37 10/19/2021    EOSABS 0.03 (L) 10/19/2021    BASOSABS 0.17 10/19/2021       Lab Results   Component Value Date     10/19/2021    K 4.0 10/19/2021     (H) 10/19/2021    CO2 23 10/19/2021    BUN 14 10/19/2021    CREATININE 0.7 10/19/2021    GLUCOSE 106 (H) 10/19/2021    CALCIUM 8.6 10/19/2021    PROT 5.5 (L) 10/19/2021    LABALBU 2.6 (L) 10/19/2021    BILITOT 0.6 10/19/2021    ALKPHOS 103 10/19/2021    AST 26 10/19/2021    ALT 16 10/19/2021    LABGLOM >60 10/19/2021    GFRAA >60 10/19/2021       Lab Results   Component Value Date    IRON 47 10/18/2021    TIBC 164 (L) 10/18/2021    FERRITIN 728 10/18/2021           Radiology-    Echo Complete    Result Date: 10/6/2021  Transthoracic Echocardiography Report (TTE)  Demographics   Patient Name    [facility]        Gender            Female                  [address], [address]  [phone]      Room Number       5426  Number   Account #       [de-identified]     Procedure Date    10/06/2021   Corporate ID                  Ordering                                Physician   Accession       [id number]    Referring         vEer Lizama  Number                        Physician Keith Lazo MD   Date of Birth   1950    Sonographer       Rosa ALY   Age             70 year(s)    Interpreting      [address] [place] [address]                                Physician         Physician Cardiology                                                  Terry Bhardwaj MD                                 Any Other  Procedure Type of Study   TTE procedure:Echo Complete W/Doppler & Color Flow. Procedure Date Date: 10/06/2021 Start: 07:43 AM Study Location: Portable Technical Quality: Adequate visualization Indications:Syncope. Patient Status: Routine Contrast Medium: Bubble Study. Height: 60 inches Weight: 150 pounds BSA: 1.65 m^2 BMI: 29.29 kg/m^2 HR: 90 bpm BP: 126/56 mmHg  Findings   Left Ventricle  Left ventricular internal dimensions were normal in diastole and systole. Borderline concentric left ventricular hypertrophy. No regional wall motion abnormalities seen. Normal left ventricular ejection fraction. Ejection fraction is visually estimated at 65%. Normal left ventricular diastolic filling pattern. Right Ventricle  Normal right ventricular size and function. Left Atrium  The left atrium is mildly dilated. Interatrial septum appears aneurysmal.   Right Atrium  Normal right atrium size. Mitral Valve  Structurally normal mitral valve. Mild mitral annular calcification. Tricuspid Valve  The tricuspid valve appears structurally normal.   Aortic Valve  The aortic valve appears mildly sclerotic. Pulmonic Valve  Pulmonic valve is structurally normal.   Pericardial Effusion  No evidence of pericardial effusion. Aorta  Aortic root dimension within normal limits. Miscellaneous  No identified intracardiac shunt via saline contrast injection including  valsalva   Conclusions   Summary  Left ventricular internal dimensions were normal in diastole and systole. Borderline concentric left ventricular hypertrophy. No regional wall motion abnormalities seen.   Normal left ventricular ejection fraction. The left atrium is mildly dilated. Interatrial septum appears aneurysmal.  Mild mitral annular calcification. The aortic valve appears mildly sclerotic.   No identified intracardiac shunt via saline contrast injection including  valsalva   Signature   ----------------------------------------------------------------  Electronically signed by Payal Boyce MD(Interpreting  physician) on 10/06/2021 03:36 PM  ----------------------------------------------------------------  M-Mode/2D Measurements & Calculations   LV Diastolic    LV Systolic Dimension: 2.7   AV Cusp Separation: 1.7 cmLA  Dimension: 4.3  cm                           Dimension: 4.4 cmAO Root  cm              LV Volume Diastolic: 24.2 ml Dimension: 2.5 cm  LV FS:37.2 %    LV Volume Systolic: 88.5 ml  LV PW           LV EDV/LV EDV Index: 97.5  Diastolic: 1.1  VY/52 PT/E^7EQ ESV/LV ESV  cm              Index: 26.3 ml/16ml/ m^2     RV Diastolic Dimension: 3.5  LV PW Systolic: EF Calculated: 86.8 %        cm  1.6 cm          LV Mass Index: 99 l/min*m^2  Septum                                       LA/Aorta: 2.39  Diastolic: 1.1                               Ascending Aorta: 2.3 cm  cm              LVOT: 2 cm                   LA volume/Index: 57 ml  Septum                                       /12CX/O^3  Systolic: 1.5                                RA Area: 17.6 cm^2  cm  CO: 7.38 l/min  CI: 4.47  l/m*m^2  LV Mass: 162.94  g  Doppler Measurements & Calculations   MV Peak E-Wave:   AV Peak Velocity: 1.57 m/s     LVOT Peak Velocity: 1.32  0.86 m/s          AV Peak Gradient: 9.9 mmHg     m/s  MV Peak A-Wave:   AV Mean Velocity: 1.14 m/s     LVOT Mean Velocity: 0.9  1.2 m/s           AV Mean Gradient: 6 mmHg       m/s  MV E/A Ratio:     AV VTI: 32.2 cm                LVOT Peak Gradient: 7  0.71              AV Area (Continuity):2.55 cm^2 mmHgLVOT Mean Gradient:  MV Peak Gradient:                                3.7 mmHg  6.5 mmHg LVOT VTI: 26.1 cm              Estimated RVSP: 26.3 mmHg  MV Mean Gradient: IVRT: 78.4 msec                Estimated RAP:3 mmHg  3.6 mmHg          Estimated PASP: 26.27 mmHg  MV Mean Velocity: Pulm. Vein A Reversal  0.92 m/s          Duration:78.4 msec             TR Velocity:2.41 m/s  MV Deceleration   Pulm. Vein D Velocity:0.52     TR Gradient:23.27 mmHg  Time: 206.8 msec  m/sPulm. Vein A Reversal       PV Peak Velocity: 1.26  MV P1/2t: 110.6   Velocity:0.35 m/s              m/s  msec              Pulm. Vein S Velocity: 0.92    PV Peak Gradient: 6.37  MVA by PHT:1.99   m/s                            mmHg  cm^2                                             PV Mean Velocity: 0.8 m/s  MV Area                                          PV Mean Gradient: 3 mmHg  (continuity): 3.2  cm^2  MV E' Septal  Velocity: 0.08  m/s  MV E' Lateral  Velocity: 10 m/s  [email or web address] [id number]    CT CHEST WO CONTRAST    Result Date: 10/19/2021  EXAMINATION: CT OF THE CHEST WITHOUT CONTRAST 10/19/2021 10:13 am TECHNIQUE: CT of the chest was performed without the administration of intravenous contrast. Multiplanar reformatted images are provided for review. Dose modulation, iterative reconstruction, and/or weight based adjustment of the mA/kV was utilized to reduce the radiation dose to as low as reasonably achievable. COMPARISON: October 4, 2021 CT chest HISTORY: ORDERING SYSTEM PROVIDED HISTORY: pulmonary infiltrates TECHNOLOGIST PROVIDED HISTORY: Reason for exam:->pulmonary infiltrates FINDINGS: Mediastinum: Prominent paratracheal lymph no identified measuring 1 cm in short axis. The pulmonary trunk is normal in size. Lungs/pleura:   Small bilateral pleural effusions identified. Scattered patchy consolidations seen in the bilateral lungs. No pneumothorax.  Upper Abdomen: No acute process identified Soft Tissues/Bones: No aggressive osseous lesions. Scattered, patchy consolidations in bilateral lungs may represent infectious process. Reactive lymph node in the mediastinum. CT THORACIC SPINE WO CONTRAST    Result Date: 10/18/2021  EXAMINATION: CT OF THE THORACIC SPINE WITHOUT CONTRAST  10/18/2021 9:14 am: TECHNIQUE: CT of the thoracic spine was performed without the administration of intravenous contrast. Multiplanar reformatted images are provided for review. Dose modulation, iterative reconstruction, and/or weight based adjustment of the mA/kV was utilized to reduce the radiation dose to as low as reasonably achievable. COMPARISON: None. HISTORY: ORDERING SYSTEM PROVIDED HISTORY: pain sp fall TECHNOLOGIST PROVIDED HISTORY: Reason for exam:->pain sp fall FINDINGS: BONES/ALIGNMENT: There is normal alignment of the spine. The vertebral body heights are maintained. No osseous destructive lesion is seen. DEGENERATIVE CHANGES: No gross spinal canal stenosis or bony neural foraminal narrowing of the thoracic spine. SOFT TISSUES: No paraspinal mass is seen. Partially imaged bilateral scattered opacities in the lungs. No traumatic injuries in thoracic spine Patchy opacities in bilateral lungs. Please refer to separate report from CT chest for further details. CT LUMBAR SPINE WO CONTRAST    Result Date: 10/18/2021  EXAMINATION: CT OF THE LUMBAR SPINE WITHOUT CONTRAST  10/18/2021 TECHNIQUE: CT of the lumbar spine was performed without the administration of intravenous contrast. Multiplanar reformatted images are provided for review. Adjustment of mA and/or kV according to patient size was utilized. Dose modulation, iterative reconstruction, and/or weight based adjustment of the mA/kV was utilized to reduce the radiation dose to as low as reasonably achievable.  COMPARISON: None HISTORY: ORDERING SYSTEM PROVIDED HISTORY: PAIN TECHNOLOGIST PROVIDED HISTORY: Reason for exam:->fall Decision Support Exception - unselect if not a suspected or confirmed emergency medical condition->Emergency Medical Condition ([place]) FINDINGS: BONES/ALIGNMENT:  There is mild, diffuse vertebral dextrocurvature. There are 5 lumbar type, non rib-bearing vertebra. There are no signs of acute fracture or dislocation. DEGENERATIVE CHANGES:  Moderate degenerative disc narrowing and spondylosis extends from L2-3 to the sacrum, most pronounced at L4-5 and L5-S1. There is mild degenerative retrolisthesis at L4-5. The facet joints are moderately degenerated and hypertrophied. The sacroiliac joints are morphologically normal, anatomically aligned, and display mld degenerative remodeling / nitrogen formation. L2-3 has a 3mm (grade 1) disc protrusion. The canal is moderately stenotic secondary to the protrusion and degenerated hypertrophied facets there is moderate bilateral foraminal stenosis. L3-4 is mildly stenotic secondary to degenerated, hypertrophied facets. L4-5 has a 5mm (grade 2) disc protrusion. The canal is moderately stenotic secondary to the protrusion and degenerated hypertrophied facets. there is moderate bilateral foraminal stenosis. L5-S1 has mild canal and moderate foraminal stenosis secondary to degenerated, hypertrophied facets. SOFT TISSUES/RETROPERITONEUM: Surgical clips lie in the pertebral right para aortic region. The paravertebral soft tissues are normal. Vasculature: The abdominal aorta, IVC and their branches are normal aside from moderate aortic atherosclerotic vascular disease. The visualized abdominal/pelvic viscera is normal.     1.  No sign of acute traumatic injury. 2. Moderate mid and lower lumbar degenerative disc disease and spondylosis. See body of report for findings at specific levels.      XR CHEST PORTABLE    Result Date: 10/4/2021  EXAMINATION: ONE XRAY VIEW OF THE CHEST 10/4/2021 7:33 pm COMPARISON: 01/01/2017 HISTORY: ORDERING SYSTEM PROVIDED HISTORY: syncope TECHNOLOGIST PROVIDED HISTORY: Reason for exam:->syncope FINDINGS: The lungs are without acute focal process. There is no effusion or pneumothorax. The cardiomediastinal silhouette is without acute process. The osseous structures are without acute process. No acute process. CT HIP RIGHT WO CONTRAST    Result Date: 9/22/2021  EXAMINATION: CT OF THE RIGHT HIP WITHOUT CONTRAST 9/22/2021 2:27 pm TECHNIQUE: CT of the right hip was performed without the administration of intravenous contrast.  Multiplanar reformatted images are provided for review. Dose modulation, iterative reconstruction, and/or weight based adjustment of the mA/kV was utilized to reduce the radiation dose to as low as reasonably achievable. COMPARISON: None. HISTORY ORDERING SYSTEM PROVIDED HISTORY: Arthritis of right hip FINDINGS: Bones: No acute fracture or dislocation in the regional skeleton. No lytic or sclerotic lesion. No osseous erosion Soft Tissue: Vascular calcification noted. Distal colonic diverticulosis. No acute diverticulitis. Status posthysterectomy. Joint: Moderate to severe osteoarthritis of the bilateral hip joints. No mid to lower lumbar spondylosis. CT scan of the right hip performed for preoperative planning. CTA PULMONARY W CONTRAST    Result Date: 10/4/2021  EXAMINATION: CTA OF THE CHEST 10/4/2021 10:12 pm TECHNIQUE: CTA of the chest was performed after the administration of intravenous contrast.  Multiplanar reformatted images are provided for review. MIP images are provided for review. Dose modulation, iterative reconstruction, and/or weight based adjustment of the mA/kV was utilized to reduce the radiation dose to as low as reasonably achievable. COMPARISON: None.  HISTORY: ORDERING SYSTEM PROVIDED HISTORY: elevated dimer; syncope TECHNOLOGIST PROVIDED HISTORY: Reason for exam:->elevated dimer; syncope Decision Support Exception - unselect if not a suspected or confirmed emergency medical condition->Emergency Medical Condition ([place]) FINDINGS: There is no acute pulmonary embolus in the main PA, right left main PAs in the lobar, segmental and subsegmental branches to the 3/4 order. There is no aneurysm formation or dissection of the thoracic aorta. Heart is normal size. Calcifications are seen in the coronary arteries. There is no pericardial effusion. There is no mediastinal masses or adenopathy. Lungs are normally expanded. Areas of linear subsegmental atelectasis of undetermined age but more likely subacute chronic findings are seen both lungs. There is no pleural effusions. There is no acute infiltrates. Upper abdominal structures are not fully covered on this study. What is visualized appear unremarkable. Patient had previous cholecystectomy. There is fat replacement of the pancreas. The biliary tree and pancreatic ductal systems are not dilated. The adrenals do not appears to be enlarged. Calcified atheromatous changes are seen in the visualized upper abdominal aorta. No evidence of acute pulmonary embolism. No aneurysm formation or dissection of the thoracic aorta. No acute pulmonary process. .     US CAROTID ARTERY BILATERAL    Result Date: 10/5/2021  EXAMINATION: ULTRASOUND EVALUATION OF THE CAROTID ARTERIES 10/5/2021 TECHNIQUE: Duplex ultrasound using B-mode/gray scaled imaging, Doppler spectral analysis and color flow Doppler was obtained of the carotid arteries. COMPARISON: None. HISTORY: ORDERING SYSTEM PROVIDED HISTORY: syncopal episode TECHNOLOGIST PROVIDED HISTORY: Reason for exam:->syncopal episode What reading provider will be dictating this exam?->CRC FINDINGS: RIGHT: There is minimal plaque at the right carotid bifurcation. The right cervical internal carotid artery displays peak systolic velocity of 864 cm/sec and normal vascular waveform. The right cervical vertebral artery is patent with appropriate direction of flow. LEFT: There is minimal plaque at the left carotid bifurcation.  The left cervical internal carotid artery displays peak systolic velocity of 747 cm/sec and normal vascular waveform. The left cervical vertebral artery is patent with appropriate direction of flow. The right internal carotid artery demonstrates 0-50% stenosis. The left internal carotid artery demonstrates 50-69% stenosis, at the bulb. Bilateral vertebral arteries are patent with flow in the normal direction. XR HIP 2-3 VW W PELVIS RIGHT    Result Date: 10/18/2021  EXAMINATION: ONE XRAY VIEW OF THE PELVIS AND TWO XRAY VIEWS RIGHT HIP 10/18/2021 8:23 am COMPARISON: Right hip radiograph 4 June 2006. HISTORY: ORDERING SYSTEM PROVIDED HISTORY: fall TECHNOLOGIST PROVIDED HISTORY: Reason for exam:->fall FINDINGS: Right hip arthroplasty is aligned anatomically with no abnormal bone or soft tissue findings. Anatomically aligned right hip arthroplasty with no unexpected findings. ASSESSMENT/PLAN :  70year-old patient of Dr. Prieto Pulse following for low grade MDS. She receives intermittent ANIA for her anemia. Cervical ca, IBS, anxiety, and dyslipidemia. R hip replacement   Covid 19    - CTL/T spine reviewed as above  - CT A/P showed patchy consolidations in BL lungs with reactive LN in the mediastinum.     - Ferritin 728, iron 47, iron sat 29, tibc 164, folate >20, B12>2000. Immature retic fract 21.6, retic ct pct 2.2.  - Hgb today 8.5. Transfuse if <7  - Will continue outpatient ANIA after DC  - Continue treatment for covid  - Trend CBC      BETTY Posey - CNP  Electronically signed 10/20/2021 at 1:39 PM  Pt seen and examined.  Note updated  Awa Riggins MD

## 2021-10-20 NOTE — PROGRESS NOTES
Physical Therapy    Facility/Department: [facility]  Treatment Note  NAME: Tana Pope  : 1950  MRN: [id number]    Date of Service: 10/20/2021    Attending Provider:  Kieth Lazo MD    Evaluating PT:  Jason Mancera. Tushar Marr P.T. Room #:  9047/1312-O  Diagnosis:  Anemia requiring transfusions [D64.9], COVID 19+, fell at home 3x since 10/4/21  Pertinent PMHx/PSHx:  R ECHO 10/4/21  Precautions:  WBAT RLE, R posterolateral hip precautions, Droplet+, and falls    SUBJECTIVE:    Pt lives with son in an apt with 7 stairs and 1 rail to enter. Pt ambulated with ww since R ECHO. OBJECTIVE:   Initial Evaluation  Date: 10/19/21 Treatment Short Term/ Long Term   Goals   Was pt agreeable to Eval/treatment? yes yes    Does pt have pain? No c/o pain, just had pain medication prior to my arrival Mild R hip pain    Bed Mobility  Rolling: MIN A  Supine to sit: MOD A  Sit to supine: MOD A  Scooting: MOD A Rolling: MIN A  Supine to sit: MOD A  Sit to supine: NA  Scooting: MIN A Independent    Transfers Sit to stand: MIN A  Stand to sit: MIN A  Stand pivot: MIN A with ww Sit to stand: MIN A  Stand to sit: MIN A  Stand pivot: SBA with ww Independent   Ambulation   40 feet with ww MIN A 15+50 feet with ww  feet with ww supervision   Stair negotiation: ascended and descended NA, pt in droplet + isolation NA, pt is in droplet+ iso TBA when pt is out of isolation with goal of 7 steps with 1 rail SBA   AM-PAC 6 Clicks  62/27      BLE ROM is WFL, R hip is [facility] allowed by hip precautions. LLE strength is grossly 4/5 to 4+/5 and RLE is grossly 3-/5 to 4-/5. Balance: sitting is SBA and standing with ww is SBA  Endurance: fair    Therapeutic Exercises:  Pt was instructed in/performed supine exercises with the RLE: ankle PF/DF with bed sheet 2x15 reps, heel slides and quad sets AAROM with bed sheet 2x10 reps, hip ABD/ADD AAROM 2x10 reps.     Patient education  Pt educated on above exs as HEP    Patient response to education:   Pt verbalized understanding Pt demonstrated skill Pt requires further education in this area   yes yes yes     ASSESSMENT:    Comments:  Pt performed supine exs prior getting OOB. When she stood up she reported a little urinary incontinence. She walked 15 feet to BR and transferred on/off commode with elevated seat and use of grab bar with SBA. She performed self hygiene care and then stood at sink with SBA while she washed her hands. Pt walk with slow step too pattern using ww in the room and c/o fatigue that limited her amb distance. Pt had no c/o light headedness this afternoon during PT session and asked to be left sitting up in chair. Treatment:  Patient practiced and was instructed in the following treatment:     Bed mobility, transfers, ADLs, and gait with ww to improve functional strength and endurance.  Above exs to improve R hip ROM and decreasing spasm and pain. Pt was left sitting up in chair with call light left by patient. Chair/bed alarm: chair alarm was activated. PLAN:    Pt is making good progress toward established Physical Therapy goals. Continue with physical therapy current plan of care. Time in  14:05  Time out  14:45    Total Treatment Time  40 minutes     Evaluation Time includes thorough review of current medical information, gathering information on past medical history/social history and prior level of function, completion of standardized testing/informal observation of tasks, assessment of data and education on plan of care and goals. CPT codes:  [] Low Complexity PT evaluation 85618  [] Moderate Complexity PT evaluation 14677  [] High Complexity PT evaluation 83587  [] PT Re-evaluation 71669  [] Gait training 27565 ** minutes  [] Manual therapy 57552 ** minutes  [x] Therapeutic activities 57967 30 minutes  [x] Therapeutic exercises 30564 10 minutes  [] Neuromuscular reeducation 99118 ** minutes     Bruce Ansari., P.T.   License Number: [id number]

## 2021-10-20 NOTE — CARE COORDINATION
Received notice from Tamara Robles that referral to her facility [facility] is still being processed. Will follow along with  and assist with discharge planning as necessary. Anabela Valdivia.  Roby, MSN, RN  [facility] Case Management  [phone]

## 2021-10-20 NOTE — PROGRESS NOTES
Occupational Therapy  OCCUPATIONAL THERAPY INITIAL EVALUATION      Date:10/20/2021  Patient Name: Jolene Yi  MRN: [id number]  : 1950  Room: [address] [place] [address], [address]         GGYR:                                                  Patient Name: oJlene Yi    MRN: [id number]    : 1950    Room: [address] [place] [address]      Evaluating OT: Preet Johnston OTR/L   [id number]      Referring Maikol Conrad MD    Specific Provider Orders/Date:OT eval and treat 10/18/2021      Diagnosis:  Anemia requiring transfusions [D64.9] COVID 19.   S/p fall at home since surgery   Recent  R ECHO 10/4/2021    Pertinent Medical History: CA, syncope and collapse    Precautions:  Fall Risk, posterolateral hip precautions, WBAT R LE, alarm, DROPLET PLUS     Assessment of current deficits    [x] Functional mobility  [x]ADLs  [x] Strength               [x]Cognition    [x] Functional transfers   [x] IADLs         [x] Safety Awareness   [x]Endurance    [x] Fine Coordination              [x] Balance      [] Vision/perception   [x]Sensation     []Gross Motor Coordination  [] ROM  [] Delirium                   [] Motor Control     OT PLAN OF CARE   OT POC based on physician orders, patient diagnosis and results of clinical assessment    Frequency/Duration  2-4 days/wk for 2 weeks PRN   Specific OT Treatment Interventions to include:   ADL retraining/adapted techniques and AE recommendations to increase functional independence within precautions                    Energy conservation techniques to improve tolerance for selfcare routine   Functional transfer/mobility training/DME recommendations for increased independence, safety and fall prevention         Patient/family education to increase safety and functional independence             Environmental modifications for safe mobility and completion of ADLs                             Therapeutic activity to improve functional performance during ADLs. Therapeutic exercise to improve tolerance and functional strength for ADLs    Balance retraining/tolerance tasks for facilitation of postural control with dynamic challenges during ADLs .       Positioning to improve functional independence      Recommended Adaptive Equipment: TBD     Home Living: Pt lives with son, 2nd floor apartment with 7 steps/rail     Bathroom setup: tub/shower with tub seat    Equipment owned: reacher, soc aide, walker     Prior Level of Function: assist  with ADLs , assist  with IADLs; ambulated with walker       Pain Level: no pain ; just lightheadedness, - patient reports she has been dealing with this for years   Cognition: A&O: 4/4;    Memory:  Good    Sequencing:  Good    Problem solving:  Fair +   Judgement/safety:  Fair+     Functional Assessment:  AM-PAC Daily Activity Raw Score: 15/24   Initial Eval Status  Date: 10/20/21 Treatment Status  Date: STGs = LTGs  Time frame: 10-14 days   Feeding Independent        Grooming SBA/set-up standing at sink with seated restbreak   Mod I    UB Dressing Min A  Mod I    LB Dressing Mod  A  Has assist AE at home  Mod I    Bathing Mod A   Mod I    Toileting SBA ,seated on commode   Mod I    Bed Mobility  Mod A  Supine <> sit   Assist with LEs  Mod I    Functional Transfers Min A  Sit-stand from bed,commode   Mod I    Functional Mobility Min A,/walker   To/from bathroom only  suoper  with good tolerance    Balance Sitting:     Static:  SBA- EOB     Dynamic:Mod A   Standing: Min A  Independent/supervision    Activity Tolerance Fair with light activity   Light headedness limiting activity tolerance   Good  with ADL activity    Visual/  Perceptual Glasses: none on bedside                 Hand Dominance right    AROM (PROM) Strength Additional Info:    RUE  WFL WFL good  and wfl FMC/dexterity noted during ADL tasks       [facility] [place] WFL good  and wfl FMC/dexterity noted during ADL tasks       Hearing: WFL   Sensation:  No c/o numbness or tingling   Tone: WFL   Edema: none observed     Comments: Upon arrival patient lying in bed . At end of session, patient returned to bed  with call light and phone within reach, all lines and tubes intact. *ALARM ON      Overall patient demonstrated  decreased independence and safety during completion of ADL/functional transfer/mobility tasks. Pt would benefit from continued skilled OT to increase safety and independence with completion of ADL/IADL tasks for functional independence and quality of life. Rehab Potential: good for established goals     Patient / Family Goal: none stated      Patient and/or family were instructed on functional diagnosis, prognosis/goals and OT plan of care. Demonstrated good understanding. Eval Complexity: Low    Time In: 0925  Time Out: 0945      Min Units   OT Eval Low 97165 x  1   OT Eval Medium 10487      OT Eval High 43184      OT Re-Eval [id number]       Therapeutic Ex [id number]       Therapeutic Activities 21275       ADL/Self Care 30082       Orthotic Management 75087       Manual 26996     Neuro Re-Ed 67872       Non-Billable Time          Evaluation Time additionally includes thorough review of current medical information, gathering information on past medical history/social history and prior level of function, interpretation of standardized testing/informal observation of tasks, assessment of data and development of plan of care and goals.             Emilia Vargas  OTR/L  OT [id number]

## 2021-10-21 ENCOUNTER — APPOINTMENT (OUTPATIENT)
Dept: CT IMAGING | Age: 71
DRG: 177 | End: 2021-10-21
Payer: MEDICARE

## 2021-10-21 LAB
ANION GAP SERPL CALCULATED.3IONS-SCNC: 9 MMOL/L (ref 7–16)
BUN BLDV-MCNC: 24 MG/DL (ref 6–23)
CALCIUM SERPL-MCNC: 8.8 MG/DL (ref 8.6–10.2)
CHLORIDE BLD-SCNC: 109 MMOL/L (ref 98–107)
CO2: 23 MMOL/L (ref 22–29)
CREAT SERPL-MCNC: 0.7 MG/DL (ref 0.5–1)
GFR AFRICAN AMERICAN: >60
GFR NON-AFRICAN AMERICAN: >60 ML/MIN/1.73
GLUCOSE BLD-MCNC: 123 MG/DL (ref 74–99)
HCT VFR BLD CALC: 26.9 % (ref 34–48)
HEMOGLOBIN: 8.9 G/DL (ref 11.5–15.5)
MCH RBC QN AUTO: 31.3 PG (ref 26–35)
MCHC RBC AUTO-ENTMCNC: 33.1 % (ref 32–34.5)
MCV RBC AUTO: 94.7 FL (ref 80–99.9)
METER GLUCOSE: 142 MG/DL (ref 74–99)
PDW BLD-RTO: 14.5 FL (ref 11.5–15)
PLATELET # BLD: 283 E9/L (ref 130–450)
PMV BLD AUTO: 9.4 FL (ref 7–12)
POTASSIUM SERPL-SCNC: 4.2 MMOL/L (ref 3.5–5)
RBC # BLD: 2.84 E12/L (ref 3.5–5.5)
SODIUM BLD-SCNC: 141 MMOL/L (ref 132–146)
WBC # BLD: 3.4 E9/L (ref 4.5–11.5)

## 2021-10-21 PROCEDURE — 36415 COLL VENOUS BLD VENIPUNCTURE: CPT

## 2021-10-21 PROCEDURE — 6370000000 HC RX 637 (ALT 250 FOR IP): Performed by: FAMILY MEDICINE

## 2021-10-21 PROCEDURE — 2580000003 HC RX 258: Performed by: INTERNAL MEDICINE

## 2021-10-21 PROCEDURE — 6370000000 HC RX 637 (ALT 250 FOR IP): Performed by: INTERNAL MEDICINE

## 2021-10-21 PROCEDURE — 1200000000 HC SEMI PRIVATE

## 2021-10-21 PROCEDURE — 6360000002 HC RX W HCPCS: Performed by: INTERNAL MEDICINE

## 2021-10-21 PROCEDURE — 6370000000 HC RX 637 (ALT 250 FOR IP): Performed by: NURSE PRACTITIONER

## 2021-10-21 PROCEDURE — 70450 CT HEAD/BRAIN W/O DYE: CPT

## 2021-10-21 PROCEDURE — 97530 THERAPEUTIC ACTIVITIES: CPT

## 2021-10-21 PROCEDURE — 80048 BASIC METABOLIC PNL TOTAL CA: CPT

## 2021-10-21 PROCEDURE — 85027 COMPLETE CBC AUTOMATED: CPT

## 2021-10-21 PROCEDURE — 82962 GLUCOSE BLOOD TEST: CPT

## 2021-10-21 RX ORDER — LOPERAMIDE HYDROCHLORIDE 2 MG/1
2 CAPSULE ORAL 4 TIMES DAILY PRN
Status: DISCONTINUED | OUTPATIENT
Start: 2021-10-21 | End: 2021-10-23 | Stop reason: HOSPADM

## 2021-10-21 RX ADMIN — ALPRAZOLAM 0.5 MG: 0.25 TABLET ORAL at 10:12

## 2021-10-21 RX ADMIN — PALIPERIDONE 3 MG: 3 TABLET, EXTENDED RELEASE ORAL at 10:11

## 2021-10-21 RX ADMIN — TRAMADOL HYDROCHLORIDE 50 MG: 50 TABLET, COATED ORAL at 17:50

## 2021-10-21 RX ADMIN — ALPRAZOLAM 0.5 MG: 0.25 TABLET ORAL at 17:50

## 2021-10-21 RX ADMIN — DEXAMETHASONE SODIUM PHOSPHATE 6 MG: 4 INJECTION, SOLUTION INTRAMUSCULAR; INTRAVENOUS at 10:12

## 2021-10-21 RX ADMIN — Medication 10 ML: at 21:38

## 2021-10-21 RX ADMIN — ENOXAPARIN SODIUM 40 MG: 40 INJECTION SUBCUTANEOUS at 10:11

## 2021-10-21 RX ADMIN — OXYCODONE HYDROCHLORIDE AND ACETAMINOPHEN 500 MG: 500 TABLET ORAL at 10:12

## 2021-10-21 RX ADMIN — Medication 10 ML: at 10:12

## 2021-10-21 RX ADMIN — TRAZODONE HYDROCHLORIDE 100 MG: 50 TABLET ORAL at 21:37

## 2021-10-21 RX ADMIN — ZINC SULFATE 220 MG (50 MG) CAPSULE 50 MG: CAPSULE at 10:12

## 2021-10-21 RX ADMIN — VORTIOXETINE 20 MG: 10 TABLET, FILM COATED ORAL at 21:37

## 2021-10-21 ASSESSMENT — PAIN SCALES - GENERAL: PAINLEVEL_OUTOF10: 7

## 2021-10-21 NOTE — PROGRESS NOTES
Pt having c/o diarrhea states she has had 4 stools today, and usually takes imodium for her IBS, will notify MD and request on pt behalf

## 2021-10-21 NOTE — DISCHARGE INSTR - COC
Continuity of Care Form    Patient Name: Merline Snipe   :  1950  MRN:  [id number]    Admit date:  10/18/2021  Discharge date:  10/23/2021    Code Status Order: Full Code   Advance Directives:      Admitting Physician:  No admitting provider for patient encounter. PCP: Dayron Valverde DO    Discharging Nurse: [facility] Unit/Room#: 2446/0216-Z  Discharging Unit Phone Number: [phone]    Emergency Contact:   Extended Emergency Contact Information  Primary Emergency Contact: Traci [address] Phone: [phone]  Mobile Phone: [phone]  Relation: Child   needed? No  Secondary Emergency Contact: Samuel Baumanbryant  [place] Phone: [phone]  Mobile Phone: [phone]  Relation: Brother/Sister  Preferred language: English   needed?  No    Past Surgical History:  Past Surgical History:   Procedure Laterality Date    BONE MARROW BIOPSY  2016    left buttocks area    CATARACT REMOVAL Bilateral     CHOLECYSTECTOMY  2013    COLONOSCOPY      HYSTERECTOMY      NECK SURGERY      x 3    OTHER SURGICAL HISTORY Left 2013    CYSTOSCOPY-RETROGRADE;LASER LITHOTRIPSY;LEFT J-STENT    NC CYSTO/URETERO/PYELOSCOPY W/LITHOTRIPSY Left 2018    CYSTOSCOPY RETROGRADE PYELOGRAM LASER LITHOTRIPSY  LEFT J-MARGE stent insertion performed by Matias Valenzuela MD at [facility]         Immunization History:   Immunization History   Administered Date(s) Administered    COVID-19, Moderna, PF, 100mcg/0.5mL 2021, 2021, 2021, 2021       Active Problems:  Patient Active Problem List   Diagnosis Code    Fibromyalgia M79.7    IBS (irritable bowel syndrome) K58.9    Major depression F32.9    Anxiety F41.9    Syncope R55    Orthostatic hypotension I95.1    Mixed hyperlipidemia E78.2    Anemia of chronic illness D63.8    Syncope and collapse R55    Anemia requiring transfusions D64.9    Pneumonia due to COVID-19 virus U07.1, J12.82    Polypharmacy Z79.899       Isolation/Infection:   Isolation            Droplet Plus          Patient Infection Status       Infection Onset Added Last Indicated Last Indicated By Review Planned Expiration Resolved Resolved By    COVID-19 10/18/21 10/18/21 10/18/21 Respiratory Panel, Molecular, with COVID-19 (Restricted: peds pts or suitable admitted adults) 10/25/21 11/01/21      Resolved    COVID-19 Rule Out 10/18/21 10/18/21 10/18/21 Respiratory Panel, Molecular, with COVID-19 (Restricted: peds pts or suitable admitted adults) (Ordered)   10/18/21 Rule-Out Test Resulted            Nurse Assessment:  Last Vital Signs: BP (!) 126/52   Pulse 77   Temp 98.6 °F (37 °C) (Oral)   Resp 20   Ht 5' (1.524 m)   Wt 185 lb 9.6 oz (84.2 kg)   SpO2 96%   BMI 36.25 kg/m²     Last documented pain score (0-10 scale): Pain Level: 0  Last Weight:   Wt Readings from Last 1 Encounters:   10/21/21 185 lb 9.6 oz (84.2 kg)     Mental Status:  oriented and alert    IV Access:  - None    Nursing Mobility/ADLs:  Walking   Assisted  Transfer  Assisted  Bathing  Assisted  Dressing  Assisted  Toileting  Assisted  Feeding  Independent  Med Admin  Assisted  Med Delivery   whole    Wound Care Documentation and Therapy:        Elimination:  Continence:   · Bowel: Yes  · Bladder: Yes- can have some incontinence at times  Urinary Catheter: None   Colostomy/Ileostomy/Ileal Conduit: No       Date of Last BM: 10/23/2021    Intake/Output Summary (Last 24 hours) at 10/21/2021 1211  Last data filed at 10/21/2021 0958  Gross per 24 hour   Intake 240 ml   Output --   Net 240 ml     I/O last 3 completed shifts:   In: 240 [P.O.:240]  Out: -     Safety Concerns:     History of Falls (last 30 days) and At Risk for Falls    Impairments/Disabilities:      Vision and Hearing    Nutrition Therapy:  Current Nutrition Therapy:   - Oral Diet:  General    Routes of Feeding:

## 2021-10-21 NOTE — CARE COORDINATION
Ms. Hanane Pichardo has been my patient for several years and has been maintained on Paliperidone and Trintellix, 20 mg a day. She called me and reported she is not getting the Trintellix and is starting to feel very depressed. I am requesting that the Trintellix 20 mg be started as soon as possible if there are no contraindications as this is an essential medication for her. If there are any questions, please call me at [phone].

## 2021-10-21 NOTE — PROGRESS NOTES
over phone. Fatigued, R hip pain related to recent replacement, lightheadedness intermittently especially with exertion, mild infrequent cough         Objective  BP (!) 126/52   Pulse 77   Temp 98.6 °F (37 °C) (Oral)   Resp 20   Ht 5' (1.524 m)   Wt 185 lb 9.6 oz (84.2 kg)   SpO2 96%   BMI 36.25 kg/m²     Physical Exam:   Performance Status:  Not preformed due to covid-19 .     Recent Laboratory Data-   Lab Results   Component Value Date    WBC 3.4 (L) 10/21/2021    HGB 8.9 (L) 10/21/2021    HCT 26.9 (L) 10/21/2021    MCV 94.7 10/21/2021     10/21/2021    LYMPHOPCT 21.1 10/19/2021    RBC 2.84 (L) 10/21/2021    MCH 31.3 10/21/2021    MCHC 33.1 10/21/2021    RDW 14.5 10/21/2021    NEUTOPHILPCT 57.8 10/19/2021    MONOPCT 11.9 10/19/2021    BASOPCT 5.5 (H) 10/19/2021    NEUTROABS 1.80 10/19/2021    LYMPHSABS 0.71 (L) 10/19/2021    MONOSABS 0.37 10/19/2021    EOSABS 0.03 (L) 10/19/2021    BASOSABS 0.17 10/19/2021       Lab Results   Component Value Date     10/21/2021    K 4.2 10/21/2021     (H) 10/21/2021    CO2 23 10/21/2021    BUN 24 (H) 10/21/2021    CREATININE 0.7 10/21/2021    GLUCOSE 123 (H) 10/21/2021    CALCIUM 8.8 10/21/2021    PROT 5.5 (L) 10/19/2021    LABALBU 2.6 (L) 10/19/2021    BILITOT 0.6 10/19/2021    ALKPHOS 103 10/19/2021    AST 26 10/19/2021    ALT 16 10/19/2021    LABGLOM >60 10/21/2021    GFRAA >60 10/21/2021       Lab Results   Component Value Date    IRON 47 10/18/2021    TIBC 164 (L) 10/18/2021    FERRITIN 728 10/18/2021           Radiology-    Echo Complete    Result Date: 10/6/2021  Transthoracic Echocardiography Report (TTE)  Demographics   Patient Name    [facility]        Gender            Female                  [facility]   Medical Record  [id number]      Room Number       5062  Number   Account #       [de-identified]     Procedure Date    10/06/2021   Corporate ID                  Ordering                                Physician   Accession       [id number]    Referring Joselyn Cheng  Number                        Physician                                                  Nisha Lazcano MD   Date of Birth   1950    Sonographer       [facility]   Age             70 year(s)    Interpreting      [address] [place] [address]                                Physician         Physician Cardiology                                                  Joan Yanez MD                                 Any Other  Procedure Type of Study   TTE procedure:Echo Complete W/Doppler & Color Flow. Procedure Date Date: 10/06/2021 Start: 07:43 AM Study Location: Portable Technical Quality: Adequate visualization Indications:Syncope. Patient Status: Routine Contrast Medium: Bubble Study. Height: 60 inches Weight: 150 pounds BSA: 1.65 m^2 BMI: 29.29 kg/m^2 HR: 90 bpm BP: 126/56 mmHg  Findings   Left Ventricle  Left ventricular internal dimensions were normal in diastole and systole. Borderline concentric left ventricular hypertrophy. No regional wall motion abnormalities seen. Normal left ventricular ejection fraction. Ejection fraction is visually estimated at 65%. Normal left ventricular diastolic filling pattern. Right Ventricle  Normal right ventricular size and function. Left Atrium  The left atrium is mildly dilated. Interatrial septum appears aneurysmal.   Right Atrium  Normal right atrium size. Mitral Valve  Structurally normal mitral valve. Mild mitral annular calcification. Tricuspid Valve  The tricuspid valve appears structurally normal.   Aortic Valve  The aortic valve appears mildly sclerotic. Pulmonic Valve  Pulmonic valve is structurally normal.   Pericardial Effusion  No evidence of pericardial effusion. Aorta  Aortic root dimension within normal limits. Miscellaneous  No identified intracardiac shunt via saline contrast injection including  valsalva   Conclusions   Summary  Left ventricular internal dimensions were normal in diastole and systole.   Borderline concentric left ventricular hypertrophy. No regional wall motion abnormalities seen. Normal left ventricular ejection fraction. The left atrium is mildly dilated. Interatrial septum appears aneurysmal.  Mild mitral annular calcification. The aortic valve appears mildly sclerotic.   No identified intracardiac shunt via saline contrast injection including  valsalva   Signature   ----------------------------------------------------------------  Electronically signed by Ginny Bustos MD(Interpreting  physician) on 10/06/2021 03:36 PM  ----------------------------------------------------------------  M-Mode/2D Measurements & Calculations   LV Diastolic    LV Systolic Dimension: 2.7   AV Cusp Separation: 1.7 cmLA  Dimension: 4.3  cm                           Dimension: 4.4 cmAO Root  cm              LV Volume Diastolic: 01.1 ml Dimension: 2.5 cm  LV FS:37.2 %    LV Volume Systolic: 72.1 ml  LV PW           LV EDV/LV EDV Index: 92.6  Diastolic: 1.1  GG/80 BW/A^1DV ESV/LV ESV  cm              Index: 26.3 ml/16ml/ m^2     RV Diastolic Dimension: 3.5  LV PW Systolic: EF Calculated: 44.6 %        cm  1.6 cm          LV Mass Index: 99 l/min*m^2  Septum                                       LA/Aorta: 6.57  Diastolic: 1.1                               Ascending Aorta: 2.3 cm  cm              LVOT: 2 cm                   LA volume/Index: 57 ml  Septum                                       /25ZF/M^1  Systolic: 1.5                                RA Area: 17.6 cm^2  cm  CO: 7.38 l/min  CI: 4.47  l/m*m^2  LV Mass: 162.94  g  Doppler Measurements & Calculations   MV Peak E-Wave:   AV Peak Velocity: 1.57 m/s     LVOT Peak Velocity: 1.32  0.86 m/s          AV Peak Gradient: 9.9 mmHg     m/s  MV Peak A-Wave:   AV Mean Velocity: 1.14 m/s     LVOT Mean Velocity: 0.9  1.2 m/s           AV Mean Gradient: 6 mmHg       m/s  MV E/A Ratio:     AV VTI: 32.2 cm                LVOT Peak Gradient: 7  0.71              AV Area (Continuity):2.55 cm^2 mmHgLVOT bilateral lungs. No pneumothorax. Upper Abdomen: No acute process identified Soft Tissues/Bones: No aggressive osseous lesions. Scattered, patchy consolidations in bilateral lungs may represent infectious process. Reactive lymph node in the mediastinum. CT THORACIC SPINE WO CONTRAST    Result Date: 10/18/2021  EXAMINATION: CT OF THE THORACIC SPINE WITHOUT CONTRAST  10/18/2021 9:14 am: TECHNIQUE: CT of the thoracic spine was performed without the administration of intravenous contrast. Multiplanar reformatted images are provided for review. Dose modulation, iterative reconstruction, and/or weight based adjustment of the mA/kV was utilized to reduce the radiation dose to as low as reasonably achievable. COMPARISON: None. HISTORY: ORDERING SYSTEM PROVIDED HISTORY: pain sp fall TECHNOLOGIST PROVIDED HISTORY: Reason for exam:->pain sp fall FINDINGS: BONES/ALIGNMENT: There is normal alignment of the spine. The vertebral body heights are maintained. No osseous destructive lesion is seen. DEGENERATIVE CHANGES: No gross spinal canal stenosis or bony neural foraminal narrowing of the thoracic spine. SOFT TISSUES: No paraspinal mass is seen. Partially imaged bilateral scattered opacities in the lungs. No traumatic injuries in thoracic spine Patchy opacities in bilateral lungs. Please refer to separate report from CT chest for further details. CT LUMBAR SPINE WO CONTRAST    Result Date: 10/18/2021  EXAMINATION: CT OF THE LUMBAR SPINE WITHOUT CONTRAST  10/18/2021 TECHNIQUE: CT of the lumbar spine was performed without the administration of intravenous contrast. Multiplanar reformatted images are provided for review. Adjustment of mA and/or kV according to patient size was utilized. Dose modulation, iterative reconstruction, and/or weight based adjustment of the mA/kV was utilized to reduce the radiation dose to as low as reasonably achievable.  COMPARISON: None HISTORY: ORDERING SYSTEM PROVIDED HISTORY: PAIN TECHNOLOGIST PROVIDED HISTORY: Reason for exam:->fall Decision Support Exception - unselect if not a suspected or confirmed emergency medical condition->Emergency Medical Condition (MA) FINDINGS: BONES/ALIGNMENT:  There is mild, diffuse vertebral dextrocurvature. There are 5 lumbar type, non rib-bearing vertebra. There are no signs of acute fracture or dislocation. DEGENERATIVE CHANGES:  Moderate degenerative disc narrowing and spondylosis extends from L2-3 to the sacrum, most pronounced at L4-5 and L5-S1. There is mild degenerative retrolisthesis at L4-5. The facet joints are moderately degenerated and hypertrophied. The sacroiliac joints are morphologically normal, anatomically aligned, and display mld degenerative remodeling / nitrogen formation. L2-3 has a 3mm (grade 1) disc protrusion. The canal is moderately stenotic secondary to the protrusion and degenerated hypertrophied facets there is moderate bilateral foraminal stenosis. L3-4 is mildly stenotic secondary to degenerated, hypertrophied facets. L4-5 has a 5mm (grade 2) disc protrusion. The canal is moderately stenotic secondary to the protrusion and degenerated hypertrophied facets. there is moderate bilateral foraminal stenosis. L5-S1 has mild canal and moderate foraminal stenosis secondary to degenerated, hypertrophied facets. SOFT TISSUES/RETROPERITONEUM: Surgical clips lie in the pertebral right para aortic region. The paravertebral soft tissues are normal. Vasculature: The abdominal aorta, IVC and their branches are normal aside from moderate aortic atherosclerotic vascular disease. The visualized abdominal/pelvic viscera is normal.     1.  No sign of acute traumatic injury. 2. Moderate mid and lower lumbar degenerative disc disease and spondylosis. See body of report for findings at specific levels.      XR CHEST PORTABLE    Result Date: 10/4/2021  EXAMINATION: ONE XRAY VIEW OF THE CHEST 10/4/2021 7:33 pm COMPARISON: 01/01/2017 HISTORY: ORDERING SYSTEM PROVIDED HISTORY: syncope TECHNOLOGIST PROVIDED HISTORY: Reason for exam:->syncope FINDINGS: The lungs are without acute focal process. There is no effusion or pneumothorax. The cardiomediastinal silhouette is without acute process. The osseous structures are without acute process. No acute process. CT HIP RIGHT WO CONTRAST    Result Date: 9/22/2021  EXAMINATION: CT OF THE RIGHT HIP WITHOUT CONTRAST 9/22/2021 2:27 pm TECHNIQUE: CT of the right hip was performed without the administration of intravenous contrast.  Multiplanar reformatted images are provided for review. Dose modulation, iterative reconstruction, and/or weight based adjustment of the mA/kV was utilized to reduce the radiation dose to as low as reasonably achievable. COMPARISON: None. HISTORY ORDERING SYSTEM PROVIDED HISTORY: Arthritis of right hip FINDINGS: Bones: No acute fracture or dislocation in the regional skeleton. No lytic or sclerotic lesion. No osseous erosion Soft Tissue: Vascular calcification noted. Distal colonic diverticulosis. No acute diverticulitis. Status posthysterectomy. Joint: Moderate to severe osteoarthritis of the bilateral hip joints. No mid to lower lumbar spondylosis. CT scan of the right hip performed for preoperative planning. CTA PULMONARY W CONTRAST    Result Date: 10/4/2021  EXAMINATION: CTA OF THE CHEST 10/4/2021 10:12 pm TECHNIQUE: CTA of the chest was performed after the administration of intravenous contrast.  Multiplanar reformatted images are provided for review. MIP images are provided for review. Dose modulation, iterative reconstruction, and/or weight based adjustment of the mA/kV was utilized to reduce the radiation dose to as low as reasonably achievable. COMPARISON: None.  HISTORY: ORDERING SYSTEM PROVIDED HISTORY: elevated dimer; syncope TECHNOLOGIST PROVIDED HISTORY: Reason for exam:->elevated dimer; syncope Decision Support Exception - unselect if not a suspected or confirmed emergency medical condition->Emergency Medical Condition (MA) FINDINGS: There is no acute pulmonary embolus in the main PA, right left main PAs in the lobar, segmental and subsegmental branches to the 3/4 order. There is no aneurysm formation or dissection of the thoracic aorta. Heart is normal size. Calcifications are seen in the coronary arteries. There is no pericardial effusion. There is no mediastinal masses or adenopathy. Lungs are normally expanded. Areas of linear subsegmental atelectasis of undetermined age but more likely subacute chronic findings are seen both lungs. There is no pleural effusions. There is no acute infiltrates. Upper abdominal structures are not fully covered on this study. What is visualized appear unremarkable. Patient had previous cholecystectomy. There is fat replacement of the pancreas. The biliary tree and pancreatic ductal systems are not dilated. The adrenals do not appears to be enlarged. Calcified atheromatous changes are seen in the visualized upper abdominal aorta. No evidence of acute pulmonary embolism. No aneurysm formation or dissection of the thoracic aorta. No acute pulmonary process. .     US CAROTID ARTERY BILATERAL    Result Date: 10/5/2021  EXAMINATION: ULTRASOUND EVALUATION OF THE CAROTID ARTERIES 10/5/2021 TECHNIQUE: Duplex ultrasound using B-mode/gray scaled imaging, Doppler spectral analysis and color flow Doppler was obtained of the carotid arteries. COMPARISON: None. HISTORY: ORDERING SYSTEM PROVIDED HISTORY: syncopal episode TECHNOLOGIST PROVIDED HISTORY: Reason for exam:->syncopal episode What reading provider will be dictating this exam?->CRC FINDINGS: RIGHT: There is minimal plaque at the right carotid bifurcation. The right cervical internal carotid artery displays peak systolic velocity of 785 cm/sec and normal vascular waveform. The right cervical vertebral artery is patent with appropriate direction of flow.  LEFT: There is minimal plaque at the left carotid bifurcation. The left cervical internal carotid artery displays peak systolic velocity of 171 cm/sec and normal vascular waveform. The left cervical vertebral artery is patent with appropriate direction of flow. The right internal carotid artery demonstrates 0-50% stenosis. The left internal carotid artery demonstrates 50-69% stenosis, at the bulb. Bilateral vertebral arteries are patent with flow in the normal direction. XR HIP 2-3 VW W PELVIS RIGHT    Result Date: 10/18/2021  EXAMINATION: ONE XRAY VIEW OF THE PELVIS AND TWO XRAY VIEWS RIGHT HIP 10/18/2021 8:23 am COMPARISON: Right hip radiograph 4 June 2006. HISTORY: ORDERING SYSTEM PROVIDED HISTORY: fall TECHNOLOGIST PROVIDED HISTORY: Reason for exam:->fall FINDINGS: Right hip arthroplasty is aligned anatomically with no abnormal bone or soft tissue findings. Anatomically aligned right hip arthroplasty with no unexpected findings. ASSESSMENT/PLAN :  70year-old patient of Dr. Gayathri Alvarado following for low grade MDS. She receives intermittent ANIA for her anemia. Cervical ca, IBS, anxiety, and dyslipidemia. R hip replacement   Covid 19    - CTL/T spine reviewed as above  - CT A/P showed patchy consolidations in BL lungs with reactive LN in the mediastinum.     - Ferritin 728, iron 47, iron sat 29, tibc 164, folate >20, B12>2000. Immature retic fract 21.6, retic ct pct 2.2.  - Hgb today 8.5. Transfuse if <7  - Will continue outpatient ANIA after DC  - Continue treatment for covid  - Trend CBC    10/21/2021  -CBC stable hemoglobin 8.9. Continue to trend CBC and transfuse for Hgb <7.  -Psychiatry following for depression. Patient is to continue on her Trintellix.  -Patient to continue treatment for Covid with dexamethasone vitamin C and zinc.  She has been afebrile and satting well on room air.  -We will continue ASA after discharge  -We will follow.     BETTY Cardenas - CNP  Electronically signed 10/21/2021 at 10:14 AM  Pt seen and examined.  Note updated  Diedra Simmonds, MD

## 2021-10-21 NOTE — PROGRESS NOTES
Physical Therapy  [facility]  Daily Treatment Note  NAME: Preet Colindres  : 1950  MRN: [id number]    Date of Service: 10/21/2021      Patient Diagnosis(es): There were no encounter diagnoses. has a past medical history of Anemia, Anxiety, Cancer ([address] Depression, Dyslipidemia, Dysrhythmia, High triglycerides, IBS (irritable bowel syndrome), Kidney stones, Muscle spasm, Neck injury, and Syncope and collapse.   has a past surgical history that includes Neck surgery; Hysterectomy; other surgical history (Left, 2013); Cholecystectomy (2013); Tonsillectomy; bone marrow biopsy (2016); Cataract removal (Bilateral); Colonoscopy; Upper gastrointestinal endoscopy; and pr cysto/uretero/pyeloscopy w/lithotripsy (Left, 2018). Attending Provider:  Nisha Lazcano MD     Evaluating PT:  Atul Jerry, P.T.     Room #:  6007/2887-O  Diagnosis:  Anemia requiring transfusions [D64.9], COVID 19+, fell at home 3x since 10/4/21  Pertinent PMHx/PSHx:  R ECHO 10/4/21  Precautions:  WBAT RLE, R posterolateral hip precautions, Droplet+, and falls     SUBJECTIVE:     Pt lives with son in an apt with 7 stairs and 1 rail to enter. Pt ambulated with ww since R ECHO.     OBJECTIVE:    Initial Evaluation  Date: 10/19/21 Treatment  10/21/21 Short Term/ Long Term   Goals   Was pt agreeable to Eval/treatment? yes yes     Does pt have pain?  No c/o pain, just had pain medication prior to my arrival Mild R hip pain     Bed Mobility  Rolling: MIN A  Supine to sit: MOD A  Sit to supine: MOD A  Scooting: MOD A Rolling: NT  Supine to sit:NT  Sit to supine: min assist  Scooting: MIN A Independent    Transfers Sit to stand: MIN A  Stand to sit: MIN A  Stand pivot: MIN A with ww Sit to stand: MIN A  Stand to sit: MIN A   Independent   Ambulation   40 feet with ww MIN A 5 feet with ww CGA (limtied secondary to dizziness) 200 feet with ww supervision   Stair negotiation: ascended and descended

## 2021-10-21 NOTE — PROGRESS NOTES
Subjective: The patient is awake and alert. Resting in bed without any complaints. No acute events overnight. Denies chest pain, angina, SOB     Objective:    BP (!) 126/52   Pulse 77   Temp 98.6 °F (37 °C) (Oral)   Resp 20   Ht 5' (1.524 m)   Wt 185 lb 9.6 oz (84.2 kg)   SpO2 96%   BMI 36.25 kg/m²     In: 240 [P.O.:240]  Out: -   In: 240   Out: -     General appearance: NAD, conversant, improved  HEENT: AT/NC, MMM  Neck: FROM, supple  Lungs: Clear to auscultation  CV: RRR, no MRGs  Vasc: Radial pulses 2+  Abdomen: Soft, non-tender; no masses or HSM  Extremities: No peripheral edema or digital cyanosis  Skin: no rash, lesions or ulcers  Psych: Alert and oriented to person, place and time  Neuro: Alert and interactive     Recent Labs     10/19/21  0905 10/20/21  1820 10/21/21  0315   WBC 3.1* 3.2* 3.4*   HGB 8.5* 9.7* 8.9*   HCT 26.4* 29.7* 26.9*    243 283       Recent Labs     10/19/21  0905 10/20/21  1820 10/21/21  0315    139 141   K 4.0 4.5 4.2   * 105 109*   CO2 23 22 23   BUN 14 22 24*   CREATININE 0.7 0.7 0.7   CALCIUM 8.6 9.1 8.8       Assessment:    Principal Problem:    Pneumonia due to COVID-19 virus  Active Problems:    Anemia of chronic illness    Anemia requiring transfusions    Polypharmacy  Resolved Problems:    * No resolved hospital problems.  *      Plan:  49-year-old female recently discharged with a right hip replacement admitted to telemetry unit with     Covid  -does not meet criteria for remdesivir  -Decadron 6 mg daily  -Vitamin D, zinc, vitamin C  -Monitor O2 saturations and supplement oxygen to keep saturations greater than 93%, wean as necessary, incentive spirometer, no nebulizers  -continue to monitor  oxygenation, check ambulating pulse ox in preparation for discharge  -Droplet plus isolation  -inflammatory marker is elevated as a manifestation of Covid     Anemia  -Monitor H&H and transfuse to keep hgb greater than 7.  Hct/Hgb on admission 21.6/6.9 transfuse 1 unit continue to monitor hgb 8.9 today  -Patient follows with Dr. Layton Medina for chronic anemia    Medication for other comorbidities continue as appropriate dose adjustment as necessary. Patient is medically cleared for discharge we will await precert for placement. DVT Prophylaxis   PT/OT  Discharge planning     Gerhardt Needy Learn, APRN - CNP  4:37 PM  10/21/2021     Per nursing staff there was an acute change in her mentation transiently  On my evaluation she is comfortable, completely alert awake oriented x3 and answering all questions appropriately  No focal neurological deficits are noted  Will obtain head CT to rule out any kind of an acute event which I doubt  Vital stable, resume as needed antidiarrheals for chronic IBS symptoms, check C. difficile  Discharge plan hopefully    Above note edited to reflect my thoughts     I personally saw, examined and provided care for the patient. Radiographs, labs and medication list were reviewed by me independently. The case was discussed in detail and plans for care were established. Review of [address] [place] [address], documentation was conducted and revisions were made as appropriate directly by me. I agree with the above documented exam, problem list, and plan of care.      Bren Oneil MD  6:57 PM  10/21/2021

## 2021-10-21 NOTE — CARE COORDINATION
Per Saul Mcgregor at [facility], facility has accepted patient and facility has initiated auth request. Facility will notify hospital on receipt of determination. Notified patient's daughter David Somers via phone of same information. Meenu Haywood.  Roby, MSN, RN  [facility] Case Management  [phone]

## 2021-10-22 LAB
ANION GAP SERPL CALCULATED.3IONS-SCNC: 10 MMOL/L (ref 7–16)
BUN BLDV-MCNC: 24 MG/DL (ref 6–23)
CALCIUM SERPL-MCNC: 8.4 MG/DL (ref 8.6–10.2)
CHLORIDE BLD-SCNC: 107 MMOL/L (ref 98–107)
CO2: 23 MMOL/L (ref 22–29)
CREAT SERPL-MCNC: 0.7 MG/DL (ref 0.5–1)
GFR AFRICAN AMERICAN: >60
GFR NON-AFRICAN AMERICAN: >60 ML/MIN/1.73
GLUCOSE BLD-MCNC: 90 MG/DL (ref 74–99)
HCT VFR BLD CALC: 27.4 % (ref 34–48)
HEMOGLOBIN: 8.7 G/DL (ref 11.5–15.5)
MCH RBC QN AUTO: 31.2 PG (ref 26–35)
MCHC RBC AUTO-ENTMCNC: 31.8 % (ref 32–34.5)
MCV RBC AUTO: 98.2 FL (ref 80–99.9)
PDW BLD-RTO: 14.6 FL (ref 11.5–15)
PLATELET # BLD: 234 E9/L (ref 130–450)
PMV BLD AUTO: 9.5 FL (ref 7–12)
POTASSIUM SERPL-SCNC: 4.3 MMOL/L (ref 3.5–5)
RBC # BLD: 2.79 E12/L (ref 3.5–5.5)
SODIUM BLD-SCNC: 140 MMOL/L (ref 132–146)
WBC # BLD: 3.5 E9/L (ref 4.5–11.5)

## 2021-10-22 PROCEDURE — 36415 COLL VENOUS BLD VENIPUNCTURE: CPT

## 2021-10-22 PROCEDURE — 85027 COMPLETE CBC AUTOMATED: CPT

## 2021-10-22 PROCEDURE — 1200000000 HC SEMI PRIVATE

## 2021-10-22 PROCEDURE — 6370000000 HC RX 637 (ALT 250 FOR IP): Performed by: FAMILY MEDICINE

## 2021-10-22 PROCEDURE — 2580000003 HC RX 258: Performed by: INTERNAL MEDICINE

## 2021-10-22 PROCEDURE — 80048 BASIC METABOLIC PNL TOTAL CA: CPT

## 2021-10-22 PROCEDURE — 6370000000 HC RX 637 (ALT 250 FOR IP): Performed by: NURSE PRACTITIONER

## 2021-10-22 PROCEDURE — 6360000002 HC RX W HCPCS: Performed by: INTERNAL MEDICINE

## 2021-10-22 PROCEDURE — 97530 THERAPEUTIC ACTIVITIES: CPT

## 2021-10-22 PROCEDURE — 6370000000 HC RX 637 (ALT 250 FOR IP): Performed by: INTERNAL MEDICINE

## 2021-10-22 RX ORDER — ZINC SULFATE 50(220)MG
50 CAPSULE ORAL DAILY
Qty: 30 CAPSULE | Refills: 3 | COMMUNITY
Start: 2021-10-23 | End: 2021-10-29

## 2021-10-22 RX ORDER — DEXAMETHASONE 0.5 MG/1
6 TABLET ORAL EVERY 6 HOURS
Qty: 480 TABLET | Refills: 0 | Status: SHIPPED | OUTPATIENT
Start: 2021-10-22 | End: 2021-11-01

## 2021-10-22 RX ORDER — POLYETHYLENE GLYCOL 3350 17 G/17G
17 POWDER, FOR SOLUTION ORAL DAILY PRN
Qty: 527 G | Refills: 1 | Status: SHIPPED | OUTPATIENT
Start: 2021-10-22 | End: 2021-11-21

## 2021-10-22 RX ORDER — ASCORBIC ACID 500 MG
500 TABLET ORAL DAILY
Qty: 6 TABLET | Refills: 0 | Status: SHIPPED | OUTPATIENT
Start: 2021-10-23 | End: 2021-10-29

## 2021-10-22 RX ADMIN — TRAMADOL HYDROCHLORIDE 50 MG: 50 TABLET, COATED ORAL at 11:12

## 2021-10-22 RX ADMIN — ENOXAPARIN SODIUM 40 MG: 40 INJECTION SUBCUTANEOUS at 11:11

## 2021-10-22 RX ADMIN — OXYCODONE HYDROCHLORIDE AND ACETAMINOPHEN 500 MG: 500 TABLET ORAL at 11:12

## 2021-10-22 RX ADMIN — PALIPERIDONE 3 MG: 3 TABLET, EXTENDED RELEASE ORAL at 11:12

## 2021-10-22 RX ADMIN — TRAZODONE HYDROCHLORIDE 100 MG: 50 TABLET ORAL at 22:13

## 2021-10-22 RX ADMIN — VORTIOXETINE 20 MG: 10 TABLET, FILM COATED ORAL at 11:12

## 2021-10-22 RX ADMIN — Medication 10 ML: at 11:12

## 2021-10-22 RX ADMIN — ZINC SULFATE 220 MG (50 MG) CAPSULE 50 MG: CAPSULE at 11:12

## 2021-10-22 RX ADMIN — ALPRAZOLAM 0.5 MG: 0.25 TABLET ORAL at 11:12

## 2021-10-22 RX ADMIN — DEXAMETHASONE SODIUM PHOSPHATE 6 MG: 4 INJECTION, SOLUTION INTRAMUSCULAR; INTRAVENOUS at 11:11

## 2021-10-22 RX ADMIN — Medication 10 ML: at 22:13

## 2021-10-22 ASSESSMENT — PAIN SCALES - GENERAL
PAINLEVEL_OUTOF10: 0
PAINLEVEL_OUTOF10: 7

## 2021-10-22 NOTE — PROGRESS NOTES
Physical Therapy  Facility/Department: [address] MED SURG  Daily Treatment Note  NAME: Lilia Dang  : 1950  MRN: [id number]    Date of Service: 10/22/2021      Patient Diagnosis(es): There were no encounter diagnoses. has a past medical history of Anemia, Anxiety, Cancer ([address] Depression, Dyslipidemia, Dysrhythmia, High triglycerides, IBS (irritable bowel syndrome), Kidney stones, Muscle spasm, Neck injury, and Syncope and collapse.   has a past surgical history that includes Neck surgery; Hysterectomy; other surgical history (Left, 2013); Cholecystectomy (2013); Tonsillectomy; bone marrow biopsy (2016); Cataract removal (Bilateral); Colonoscopy; Upper gastrointestinal endoscopy; and pr cysto/uretero/pyeloscopy w/lithotripsy (Left, 2018). Attending Provider:  Aggie Abreu MD     Evaluating PT:  Severiano Chill. [facility]     Room #:  7400/2428-B  Diagnosis:  Anemia requiring transfusions [D64.9], COVID 19+, fell at home 3x since 10/4/21  Pertinent PMHx/PSHx:  R ECHO 10/4/21  Precautions:  WBAT RLE, R posterolateral hip precautions, Droplet+, and falls     SUBJECTIVE:     Pt lives with son in an apt with 7 stairs and 1 rail to enter. Pt ambulated with ww since R ECHO.     OBJECTIVE:    Initial Evaluation  Date: 10/19/21 Treatment  10/22/21 Short Term/ Long Term   Goals   Was pt agreeable to Eval/treatment? yes yes     Does pt have pain?  No c/o pain, just had pain medication prior to my arrival Mild R hip pain     Bed Mobility  Rolling: MIN A  Supine to sit: MOD A  Sit to supine: MOD A  Scooting: MOD A Rolling: NT  Supine to sit: MOD A  Sit to supine: NT  Scooting: MIN A Independent    Transfers Sit to stand: MIN A  Stand to sit: MIN A  Stand pivot: MIN A with ww Sit to stand: MIN A  Stand to sit: MIN A   Independent   Ambulation   40 feet with ww MIN A 70 feet x 1 using WW for support WBAT R LE, MIN A for balance 200 feet with ww supervision   Stair negotiation: ascended and descended NA, pt in droplet + isolation NA, pt is in droplet+ iso TBA when pt is out of isolation with goal of 7 steps with 1 rail SBA   AM-PAC 6 Clicks 06/84 84/55          Pt is alert and able to follow instruction, states feels better today, wants to get OOB and walk  Balance: poor dynamic using [employer] for support    Pt performed therapeutic exercise of the following: NT    Patient education  Pt was educated on UE usage to assist with transfers, gait promoting staying within [employer] base of support, hip precautions    Patient response to education:   Pt verbalized understanding Pt demonstrated skill Pt requires further education in this area   yes With instruction yes     ASSESSMENT:   Comments: Pt found in bed, agreed to rx. Pt assisted to EOB, instruction for hip precautions given while Pt seated EOB to prevent forward trunk flexion. Gait performed in the room, leann slow and consistent. Pt unsteady throughout, required constant hands on assist for balance and safety, no shortness of breath noted. Treatment: Pt practiced and was instructed in the following treatment: transfer safety, hip precautions, gait quality    Pt was left in a bedside chair with call light in reach. Chair/bed alarm: chair alarm active    Time in 1432   Time out 1452   Total Treatment Time 20 minutes   CPT codes:     Therapeutic activities 99617 20 minutes   Therapeutic exercises 73366 0 minutes       Pt is making gppd progress toward established Physical Therapy goals. Continue with physical therapy current plan of care.     [facility] [place]   License Number: [id number]

## 2021-10-22 NOTE — PROGRESS NOTES
[facility] Quality Flow/Interdisciplinary Rounds Progress Note        Quality Flow Rounds held on October 22, 2021    Disciplines Attending:  Bedside Nurse, ,  and Nursing Unit Leadership    Sylvia Bajwa was admitted on 10/18/2021  8:23 AM    Anticipated Discharge Date:  Expected Discharge Date: 10/22/21    Disposition:    Robby Score:  Robby Scale Score: 19    Readmission Risk              Risk of Unplanned Readmission:  20           Discussed patient goal for the day, patient clinical progression, and barriers to discharge.   The following Goal(s) of the Day/Commitment(s) have been identified:  Diagnostics - Report Results and Labs - Report Results, DC planning      Olamide Joy RN  October 22, 2021

## 2021-10-22 NOTE — PROGRESS NOTES
Subjective: The patient is awake and alert. Resting in bed without any complaints. No acute events overnight. Denies chest pain, angina, SOB     Objective:    BP (!) 155/67   Pulse 65   Temp 98.1 °F (36.7 °C) (Oral)   Resp 16   Ht 5' (1.524 m)   Wt 182 lb (82.6 kg)   SpO2 95%   BMI 35.54 kg/m²     In: 120 [P.O.:120]  Out: 1500   In: 120   Out: 1500 [Urine:1500]    General appearance: NAD, conversant, improved  HEENT: AT/NC, MMM  Neck: FROM, supple  Lungs: Clear to auscultation  CV: RRR, no MRGs  Vasc: Radial pulses 2+  Abdomen: Soft, non-tender; no masses or HSM  Extremities: No peripheral edema or digital cyanosis  Skin: no rash, lesions or ulcers  Psych: Alert and oriented to person, place and time  Neuro: Alert and interactive     Recent Labs     10/20/21  1820 10/21/21  0315 10/22/21  0848   WBC 3.2* 3.4* 3.5*   HGB 9.7* 8.9* 8.7*   HCT 29.7* 26.9* 27.4*    283 234       Recent Labs     10/20/21  1820 10/21/21  0315 10/22/21  0848    141 140   K 4.5 4.2 4.3    109* 107   CO2 22 23 23   BUN 22 24* 24*   CREATININE 0.7 0.7 0.7   CALCIUM 9.1 8.8 8.4*       Assessment:    Principal Problem:    Pneumonia due to COVID-19 virus  Active Problems:    Anemia of chronic illness    Anemia requiring transfusions    Polypharmacy  Resolved Problems:    * No resolved hospital problems.  *      Plan:   65-year-old female recently discharged with a right hip replacement admitted to telemetry unit with     Covid  -does not meet criteria for remdesivir  -Decadron 6 mg daily  -Vitamin D, zinc, vitamin C  -Monitor O2 saturations and supplement oxygen to keep saturations greater than 93%, wean as necessary, incentive spirometer, no nebulizers  -continue to monitor  oxygenation, check ambulating pulse ox in preparation for discharge  -Droplet plus isolation  -inflammatory marker is elevated as a manifestation of Covid     Anemia  -Monitor H&H and transfuse to keep hgb greater than 7.  Hct/Hgb on

## 2021-10-22 NOTE — PROGRESS NOTES
[facility]  Hematology/Oncology  Consult      Patient Name: Alon Gonzalez  YOB: 1950  PCP: Kami Hampton DO   Referring Provider:      Reason for Consultation:   Chief Complaint   Patient presents with    Fall      Subjective: Patient has been afebrile. Comfortable on room air. There was an episode of mild confusion yesterday. CT brain was unremarkable. Back to baseline. History of Present Illness: This is a 27-year-old patient of Dr. Isela Baig following for low grade MDS. She receives intermittent ANIA for her anemia. She also has a PMH of cervical ca, IBS, anxiety, and dyslipidemia. She presented to the ED for evaluation of weakness with falling at home. She recently had a R hip replacement and was discharged 2 weeks ago. Upon evaluation her hgb 6.9 Today's CBC with hgb 8.5. WBC 3.1 CT lumbar spine no injury but degenerative disease and spondylosis. CT thoracic spine with no injury but patchy opacities in BL lungs. CT A/P showed patchy consolidations in BL lungs with reactive LN in the mediastinum. Ferritin 728, iron 47, iron sat 29, tibc 164, folate >20, B12>2000. Immature retic fract 21.6, retic ct pct 2.2. Consultation for evaluation of anemia. Diagnostic Data:     Past Medical History:   Diagnosis Date    Anemia     Anxiety     With panic attacks.  Cancer [facility])     Cervical with radiation implant; history of hysterctomy due to CA, 1995.  Depression     Major depressive disorder with psychotic features.     Dyslipidemia     Dysrhythmia     High triglycerides     patient denies    IBS (irritable bowel syndrome)     Kidney stones     Muscle spasm     Neck injury 1992    Syncope and collapse        Patient Active Problem List    Diagnosis Date Noted    Anemia requiring transfusions 10/18/2021    Pneumonia due to COVID-19 virus 10/18/2021    Polypharmacy 10/18/2021    Syncope and collapse 10/05/2021    Mixed hyperlipidemia 01/02/2017    Anemia of chronic illness 01/02/2017    Orthostatic hypotension     Syncope 01/01/2017    Anxiety     Major depression 03/29/2014    Fibromyalgia     IBS (irritable bowel syndrome)         Past Surgical History:   Procedure Laterality Date    BONE MARROW BIOPSY  09/12/2016    left buttocks area    CATARACT REMOVAL Bilateral     CHOLECYSTECTOMY  12/23/2013    COLONOSCOPY      HYSTERECTOMY      NECK SURGERY      x 3    OTHER SURGICAL HISTORY Left 12/12/2013    CYSTOSCOPY-RETROGRADE;LASER LITHOTRIPSY;LEFT J-STENT    [place] CYSTO/URETERO/PYELOSCOPY W/LITHOTRIPSY Left 4/19/2018    CYSTOSCOPY RETROGRADE PYELOGRAM LASER LITHOTRIPSY  LEFT J-MARGE stent insertion performed by Home Barrow MD at [facility]  [facility] ENDOSCOPY         Family History  Family History   Problem Relation Age of Onset    Other Mother         heart and lung disease    Heart Disease Father     Lung Cancer Sister        Social History    TOBACCO:   reports that she has never smoked. She has never used smokeless tobacco.  ETOH:   reports no history of alcohol use. Home Medications  Prior to Admission medications    Medication Sig Start Date End Date Taking? Authorizing Provider   aspirin 325 MG tablet Take 325 mg by mouth daily   Yes Historical Provider, MD   Multiple Vitamins-Minerals (CENTRUM MULTIGUMMIES) CHEW Take 2 each by mouth daily   Yes Historical Provider, MD   VORTIoxetine HBr (TRINTELLIX) 20 MG TABS tablet Take 20 mg by mouth daily    Yes Historical Provider, MD   paliperidone (INVEGA) 3 MG ER tablet Take 3 mg by mouth every morning   Yes Historical Provider, MD   traMADol (ULTRAM) 50 MG tablet Take 50 mg by mouth every 6 hours as needed. Yes Historical Provider, MD   traZODone (DESYREL) 100 MG tablet Take 1 tablet by mouth nightly. 4/1/14  Yes Gianni Maldonado MD   ALPRAZolam Len Distance) 0.5 MG tablet Take 0.5 mg by mouth 3 times daily as needed.     Yes Historical Provider, MD Allergies  No Known Allergies     Review of Systems: I did not personally examine the patient. Discussed findings with the patient's nurse as well as reviewed the chart. No new complaint except transient confusion that is improving. CT brain was unremarkable. Objective  BP (!) 155/67   Pulse 65   Temp 98.1 °F (36.7 °C) (Oral)   Resp 16   Ht 5' (1.524 m)   Wt 182 lb (82.6 kg)   SpO2 95%   BMI 35.54 kg/m²     Physical Exam:   Performance Status:  Not preformed due to covid-19 . Reviewed the chart. Discussed findings with the patient's nurse. No new finding except for mild confusion noted yesterday which is improving.     Recent Laboratory Data-   Lab Results   Component Value Date    WBC 3.5 (L) 10/22/2021    HGB 8.7 (L) 10/22/2021    HCT 27.4 (L) 10/22/2021    MCV 98.2 10/22/2021     10/22/2021    LYMPHOPCT 21.1 10/19/2021    RBC 2.79 (L) 10/22/2021    MCH 31.2 10/22/2021    MCHC 31.8 (L) 10/22/2021    RDW 14.6 10/22/2021    NEUTOPHILPCT 57.8 10/19/2021    MONOPCT 11.9 10/19/2021    BASOPCT 5.5 (H) 10/19/2021    NEUTROABS 1.80 10/19/2021    LYMPHSABS 0.71 (L) 10/19/2021    MONOSABS 0.37 10/19/2021    EOSABS 0.03 (L) 10/19/2021    BASOSABS 0.17 10/19/2021       Lab Results   Component Value Date     10/22/2021    K 4.3 10/22/2021     10/22/2021    CO2 23 10/22/2021    BUN 24 (H) 10/22/2021    CREATININE 0.7 10/22/2021    GLUCOSE 90 10/22/2021    CALCIUM 8.4 (L) 10/22/2021    PROT 5.5 (L) 10/19/2021    LABALBU 2.6 (L) 10/19/2021    BILITOT 0.6 10/19/2021    ALKPHOS 103 10/19/2021    AST 26 10/19/2021    ALT 16 10/19/2021    LABGLOM >60 10/22/2021    GFRAA >60 10/22/2021       Lab Results   Component Value Date    IRON 47 10/18/2021    TIBC 164 (L) 10/18/2021    FERRITIN 728 10/18/2021           Radiology-    Echo Complete    Result Date: 10/6/2021  Transthoracic Echocardiography Report (TTE)  Demographics   Patient Name    [facility]        Gender            Female [address] [facility] Record  [id number]      Room Number       6076  Number   Account #       [de-identified]     Procedure Date    10/06/2021   Corporate ID                  Ordering                                Physician   Accession       [id number]    Referring         Swathi Vu  Number                        Physician                                                  Jacky Gutierrez MD   Date of Birth   1950    Sonographer       Rosa Garner   Age             70 year(s)    Interpreting      [address] [place] [address]                                Physician         Physician Cardiology                                                  Job Zulema HOUSTON                                 Any Other  Procedure Type of Study   TTE procedure:Echo Complete W/Doppler & Color Flow. Procedure Date Date: 10/06/2021 Start: 07:43 AM Study Location: Portable Technical Quality: Adequate visualization Indications:Syncope. Patient Status: Routine Contrast Medium: Bubble Study. Height: 60 inches Weight: 150 pounds BSA: 1.65 m^2 BMI: 29.29 kg/m^2 HR: 90 bpm BP: 126/56 mmHg  Findings   Left Ventricle  Left ventricular internal dimensions were normal in diastole and systole. Borderline concentric left ventricular hypertrophy. No regional wall motion abnormalities seen. Normal left ventricular ejection fraction. Ejection fraction is visually estimated at 65%. Normal left ventricular diastolic filling pattern. Right Ventricle  Normal right ventricular size and function. Left Atrium  The left atrium is mildly dilated. Interatrial septum appears aneurysmal.   Right Atrium  Normal right atrium size. Mitral Valve  Structurally normal mitral valve. Mild mitral annular calcification. Tricuspid Valve  The tricuspid valve appears structurally normal.   Aortic Valve  The aortic valve appears mildly sclerotic. Pulmonic Valve  Pulmonic valve is structurally normal.   Pericardial Effusion  No evidence of pericardial effusion.    Aorta Aortic root dimension within normal limits. Miscellaneous  No identified intracardiac shunt via saline contrast injection including  valsalva   Conclusions   Summary  Left ventricular internal dimensions were normal in diastole and systole. Borderline concentric left ventricular hypertrophy. No regional wall motion abnormalities seen. Normal left ventricular ejection fraction. The left atrium is mildly dilated. Interatrial septum appears aneurysmal.  Mild mitral annular calcification. The aortic valve appears mildly sclerotic.   No identified intracardiac shunt via saline contrast injection including  valsalva   Signature   ----------------------------------------------------------------  Electronically signed by Renaldo Mcfadden MD(Interpreting  physician) on 10/06/2021 03:36 PM  ----------------------------------------------------------------  M-Mode/2D Measurements & Calculations   LV Diastolic    LV Systolic Dimension: 2.7   AV Cusp Separation: 1.7 cmLA  Dimension: 4.3  cm                           Dimension: 4.4 cmAO Root  cm              LV Volume Diastolic: 65.4 ml Dimension: 2.5 cm  LV FS:37.2 %    LV Volume Systolic: 48.0 ml  LV PW           LV EDV/LV EDV Index: 98.0  Diastolic: 1.1  PS/74 XB/V^0RJ ESV/LV ESV  cm              Index: 26.3 ml/16ml/ m^2     RV Diastolic Dimension: 3.5  LV PW Systolic: EF Calculated: 92.3 %        cm  1.6 cm          LV Mass Index: 99 l/min*m^2  Septum                                       LA/Aorta: 1.66  Diastolic: 1.1                               Ascending Aorta: 2.3 cm  cm              LVOT: 2 cm                   LA volume/Index: 57 ml  Septum                                       /76WE/K^8  Systolic: 1.5                                RA Area: 17.6 cm^2  cm  CO: 7.38 l/min  CI: 4.47  l/m*m^2  LV Mass: 162.94  g  Doppler Measurements & Calculations   MV Peak E-Wave:   AV Peak Velocity: 1.57 m/s     LVOT Peak Velocity: 1.32  0.86 m/s          AV Peak Gradient: 9.9 mmHg m/s  MV Peak A-Wave:   AV Mean Velocity: 1.14 m/s     LVOT Mean Velocity: 0.9  1.2 m/s           AV Mean Gradient: 6 mmHg       m/s  MV E/A Ratio:     AV VTI: 32.2 cm                LVOT Peak Gradient: 7  0.71              AV Area (Continuity):2.55 cm^2 mmHgLVOT Mean Gradient:  MV Peak Gradient:                                3.7 mmHg  6.5 mmHg          LVOT VTI: 26.1 cm              Estimated RVSP: 26.3 mmHg  MV Mean Gradient: IVRT: 78.4 msec                Estimated RAP:3 mmHg  3.6 mmHg          Estimated PASP: 26.27 mmHg  MV Mean Velocity: Pulm. Vein A Reversal  0.92 m/s          Duration:78.4 msec             TR Velocity:2.41 m/s  MV Deceleration   Pulm. Vein D Velocity:0.52     TR Gradient:23.27 mmHg  Time: 206.8 msec  m/sPulm. Vein A Reversal       PV Peak Velocity: 1.26  MV P1/2t: 110.6   Velocity:0.35 m/s              m/s  msec              Pulm. Vein S Velocity: 0.92    PV Peak Gradient: 6.37  MVA by PHT:1.99   m/s                            mmHg  cm^2                                             PV Mean Velocity: 0.8 m/s  MV Area                                          PV Mean Gradient: 3 mmHg  (continuity): 3.2  cm^2  MV E' Septal  Velocity: 0.08  m/s  MV E' Lateral  Velocity: 10 m/s  [email or web address] [id number]    CT CHEST WO CONTRAST    Result Date: 10/19/2021  EXAMINATION: CT OF THE CHEST WITHOUT CONTRAST 10/19/2021 10:13 am TECHNIQUE: CT of the chest was performed without the administration of intravenous contrast. Multiplanar reformatted images are provided for review. Dose modulation, iterative reconstruction, and/or weight based adjustment of the mA/kV was utilized to reduce the radiation dose to as low as reasonably achievable.  COMPARISON: October 4, 2021 CT chest HISTORY: ORDERING SYSTEM PROVIDED HISTORY: pulmonary infiltrates TECHNOLOGIST PROVIDED HISTORY: Reason for exam:->pulmonary infiltrates FINDINGS: Mediastinum: Prominent paratracheal lymph no identified measuring 1 cm in short axis. The pulmonary trunk is normal in size. Lungs/pleura:   Small bilateral pleural effusions identified. Scattered patchy consolidations seen in the bilateral lungs. No pneumothorax. Upper Abdomen: No acute process identified Soft Tissues/Bones: No aggressive osseous lesions. Scattered, patchy consolidations in bilateral lungs may represent infectious process. Reactive lymph node in the mediastinum. CT THORACIC SPINE WO CONTRAST    Result Date: 10/18/2021  EXAMINATION: CT OF THE THORACIC SPINE WITHOUT CONTRAST  10/18/2021 9:14 am: TECHNIQUE: CT of the thoracic spine was performed without the administration of intravenous contrast. Multiplanar reformatted images are provided for review. Dose modulation, iterative reconstruction, and/or weight based adjustment of the mA/kV was utilized to reduce the radiation dose to as low as reasonably achievable. COMPARISON: None. HISTORY: ORDERING SYSTEM PROVIDED HISTORY: pain sp fall TECHNOLOGIST PROVIDED HISTORY: Reason for exam:->pain sp fall FINDINGS: BONES/ALIGNMENT: There is normal alignment of the spine. The vertebral body heights are maintained. No osseous destructive lesion is seen. DEGENERATIVE CHANGES: No gross spinal canal stenosis or bony neural foraminal narrowing of the thoracic spine. SOFT TISSUES: No paraspinal mass is seen. Partially imaged bilateral scattered opacities in the lungs. No traumatic injuries in thoracic spine Patchy opacities in bilateral lungs. Please refer to separate report from CT chest for further details. CT LUMBAR SPINE WO CONTRAST    Result Date: 10/18/2021  EXAMINATION: CT OF THE LUMBAR SPINE WITHOUT CONTRAST  10/18/2021 TECHNIQUE: CT of the lumbar spine was performed without the administration of intravenous contrast. Multiplanar reformatted images are provided for review.  Adjustment of mA and/or kV according to patient size was utilized. Dose modulation, iterative reconstruction, and/or weight based adjustment of the mA/kV was utilized to reduce the radiation dose to as low as reasonably achievable. COMPARISON: None HISTORY: ORDERING SYSTEM PROVIDED HISTORY: PAIN TECHNOLOGIST PROVIDED HISTORY: Reason for exam:->fall Decision Support Exception - unselect if not a suspected or confirmed emergency medical condition->Emergency Medical Condition (MA) FINDINGS: BONES/ALIGNMENT:  There is mild, diffuse vertebral dextrocurvature. There are 5 lumbar type, non rib-bearing vertebra. There are no signs of acute fracture or dislocation. DEGENERATIVE CHANGES:  Moderate degenerative disc narrowing and spondylosis extends from L2-3 to the sacrum, most pronounced at L4-5 and L5-S1. There is mild degenerative retrolisthesis at L4-5. The facet joints are moderately degenerated and hypertrophied. The sacroiliac joints are morphologically normal, anatomically aligned, and display mld degenerative remodeling / nitrogen formation. L2-3 has a 3mm (grade 1) disc protrusion. The canal is moderately stenotic secondary to the protrusion and degenerated hypertrophied facets there is moderate bilateral foraminal stenosis. L3-4 is mildly stenotic secondary to degenerated, hypertrophied facets. L4-5 has a 5mm (grade 2) disc protrusion. The canal is moderately stenotic secondary to the protrusion and degenerated hypertrophied facets. there is moderate bilateral foraminal stenosis. L5-S1 has mild canal and moderate foraminal stenosis secondary to degenerated, hypertrophied facets. SOFT TISSUES/RETROPERITONEUM: Surgical clips lie in the pertebral right para aortic region. The paravertebral soft tissues are normal. Vasculature: The abdominal aorta, IVC and their branches are normal aside from moderate aortic atherosclerotic vascular disease. The visualized abdominal/pelvic viscera is normal.     1.  No sign of acute traumatic injury. radiation dose to as low as reasonably achievable. COMPARISON: None. HISTORY: ORDERING SYSTEM PROVIDED HISTORY: elevated dimer; syncope TECHNOLOGIST PROVIDED HISTORY: Reason for exam:->elevated dimer; syncope Decision Support Exception - unselect if not a suspected or confirmed emergency medical condition->Emergency Medical Condition (MA) FINDINGS: There is no acute pulmonary embolus in the main PA, right left main PAs in the lobar, segmental and subsegmental branches to the 3/4 order. There is no aneurysm formation or dissection of the thoracic aorta. Heart is normal size. Calcifications are seen in the coronary arteries. There is no pericardial effusion. There is no mediastinal masses or adenopathy. Lungs are normally expanded. Areas of linear subsegmental atelectasis of undetermined age but more likely subacute chronic findings are seen both lungs. There is no pleural effusions. There is no acute infiltrates. Upper abdominal structures are not fully covered on this study. What is visualized appear unremarkable. Patient had previous cholecystectomy. There is fat replacement of the pancreas. The biliary tree and pancreatic ductal systems are not dilated. The adrenals do not appears to be enlarged. Calcified atheromatous changes are seen in the visualized upper abdominal aorta. No evidence of acute pulmonary embolism. No aneurysm formation or dissection of the thoracic aorta. No acute pulmonary process. .     US CAROTID ARTERY BILATERAL    Result Date: 10/5/2021  EXAMINATION: ULTRASOUND EVALUATION OF THE CAROTID ARTERIES 10/5/2021 TECHNIQUE: Duplex ultrasound using B-mode/gray scaled imaging, Doppler spectral analysis and color flow Doppler was obtained of the carotid arteries. COMPARISON: None.  HISTORY: ORDERING SYSTEM PROVIDED HISTORY: syncopal episode TECHNOLOGIST PROVIDED HISTORY: Reason for exam:->syncopal episode What reading provider will be dictating this exam?->CRC FINDINGS: RIGHT: There is minimal plaque at the right carotid bifurcation. The right cervical internal carotid artery displays peak systolic velocity of 717 cm/sec and normal vascular waveform. The right cervical vertebral artery is patent with appropriate direction of flow. LEFT: There is minimal plaque at the left carotid bifurcation. The left cervical internal carotid artery displays peak systolic velocity of 628 cm/sec and normal vascular waveform. The left cervical vertebral artery is patent with appropriate direction of flow. The right internal carotid artery demonstrates 0-50% stenosis. The left internal carotid artery demonstrates 50-69% stenosis, at the bulb. Bilateral vertebral arteries are patent with flow in the normal direction. XR HIP 2-3 VW W PELVIS RIGHT    Result Date: 10/18/2021  EXAMINATION: ONE XRAY VIEW OF THE PELVIS AND TWO XRAY VIEWS RIGHT HIP 10/18/2021 8:23 am COMPARISON: Right hip radiograph 4 June 2006. HISTORY: ORDERING SYSTEM PROVIDED HISTORY: fall TECHNOLOGIST PROVIDED HISTORY: Reason for exam:->fall FINDINGS: Right hip arthroplasty is aligned anatomically with no abnormal bone or soft tissue findings. Anatomically aligned right hip arthroplasty with no unexpected findings. ASSESSMENT/PLAN :  70year-old patient of Dr. Sean Nieto following for low grade MDS. She receives intermittent ANIA for her anemia. Cervical ca, IBS, anxiety, and dyslipidemia. R hip replacement   Covid 19    - CTL/T spine reviewed as above  - CT A/P showed patchy consolidations in BL lungs with reactive LN in the mediastinum.     - Ferritin 728, iron 47, iron sat 29, tibc 164, folate >20, B12>2000. Immature retic fract 21.6, retic ct pct 2.2.  - Hgb today 8.5. Transfuse if <7  - Will continue outpatient ANIA after DC  - Continue treatment for covid  - Trend CBC    10/21/2021  -CBC stable hemoglobin 8.9. Continue to trend CBC and transfuse for Hgb <7.  -Psychiatry following for depression.   Patient is to continue on her Trintellix.  -Patient to continue treatment for Covid with dexamethasone vitamin C and zinc.  She has been afebrile and satting well on room air.  -We will continue ASA after discharge  -We will follow. 10/21/2021.   -CBC stable hemoglobin 8.7. Continue to trend CBC and transfuse for Hgb <7.  -Continue treatment Covid. Patient is being treated with steroids. Comfortable on room air. No resp distress. Had an episode of transient confusion yesterday. CT head was unremarkable. Back to baseline. We will continue to follow.     Bernardo Carter MD  Electronically signed 10/22/2021 at 12:50 PM

## 2021-10-22 NOTE — CARE COORDINATION
Received notification from Ashley Mathew at [facility] that facility has received authorization for SNF stay and can accept patient for admission 10/23/21 at 1 PM.  Lan Maggie w/c has been scheduled for  at 1 PM.  Facility has contract with same and will accept charges per Bhupinder Dukes. Patient's daughter Inocencia Robles notified of dc plan via phone. Envelope in lite chart with demo's, w/c form and HENS. Alon Foster.  Roby, MSN, RN  [facility] Case Management  [phone]

## 2021-10-23 VITALS
OXYGEN SATURATION: 95 % | HEART RATE: 60 BPM | HEIGHT: 60 IN | BODY MASS INDEX: 35.93 KG/M2 | DIASTOLIC BLOOD PRESSURE: 70 MMHG | SYSTOLIC BLOOD PRESSURE: 150 MMHG | RESPIRATION RATE: 16 BRPM | WEIGHT: 183 LBS | TEMPERATURE: 98.3 F

## 2021-10-23 LAB
ANION GAP SERPL CALCULATED.3IONS-SCNC: 11 MMOL/L (ref 7–16)
BUN BLDV-MCNC: 26 MG/DL (ref 6–23)
CALCIUM SERPL-MCNC: 9 MG/DL (ref 8.6–10.2)
CHLORIDE BLD-SCNC: 103 MMOL/L (ref 98–107)
CO2: 22 MMOL/L (ref 22–29)
CREAT SERPL-MCNC: 0.7 MG/DL (ref 0.5–1)
GFR AFRICAN AMERICAN: >60
GFR NON-AFRICAN AMERICAN: >60 ML/MIN/1.73
GLUCOSE BLD-MCNC: 137 MG/DL (ref 74–99)
HCT VFR BLD CALC: 30.8 % (ref 34–48)
HEMOGLOBIN: 9.9 G/DL (ref 11.5–15.5)
MCH RBC QN AUTO: 30.9 PG (ref 26–35)
MCHC RBC AUTO-ENTMCNC: 32.1 % (ref 32–34.5)
MCV RBC AUTO: 96.3 FL (ref 80–99.9)
PDW BLD-RTO: 14.4 FL (ref 11.5–15)
PLATELET # BLD: 258 E9/L (ref 130–450)
PMV BLD AUTO: 9.3 FL (ref 7–12)
POTASSIUM SERPL-SCNC: 3.9 MMOL/L (ref 3.5–5)
RBC # BLD: 3.2 E12/L (ref 3.5–5.5)
SODIUM BLD-SCNC: 136 MMOL/L (ref 132–146)
WBC # BLD: 4.2 E9/L (ref 4.5–11.5)

## 2021-10-23 PROCEDURE — 85027 COMPLETE CBC AUTOMATED: CPT

## 2021-10-23 PROCEDURE — 2580000003 HC RX 258: Performed by: INTERNAL MEDICINE

## 2021-10-23 PROCEDURE — 6370000000 HC RX 637 (ALT 250 FOR IP): Performed by: FAMILY MEDICINE

## 2021-10-23 PROCEDURE — 36415 COLL VENOUS BLD VENIPUNCTURE: CPT

## 2021-10-23 PROCEDURE — 80048 BASIC METABOLIC PNL TOTAL CA: CPT

## 2021-10-23 PROCEDURE — 6370000000 HC RX 637 (ALT 250 FOR IP): Performed by: NURSE PRACTITIONER

## 2021-10-23 PROCEDURE — 6370000000 HC RX 637 (ALT 250 FOR IP): Performed by: INTERNAL MEDICINE

## 2021-10-23 PROCEDURE — 6360000002 HC RX W HCPCS: Performed by: INTERNAL MEDICINE

## 2021-10-23 RX ADMIN — ALPRAZOLAM 0.5 MG: 0.25 TABLET ORAL at 00:33

## 2021-10-23 RX ADMIN — VORTIOXETINE 20 MG: 10 TABLET, FILM COATED ORAL at 10:09

## 2021-10-23 RX ADMIN — TRAMADOL HYDROCHLORIDE 50 MG: 50 TABLET, COATED ORAL at 10:12

## 2021-10-23 RX ADMIN — ENOXAPARIN SODIUM 40 MG: 40 INJECTION SUBCUTANEOUS at 10:09

## 2021-10-23 RX ADMIN — Medication 10 ML: at 10:10

## 2021-10-23 RX ADMIN — ZINC SULFATE 220 MG (50 MG) CAPSULE 50 MG: CAPSULE at 10:09

## 2021-10-23 RX ADMIN — PALIPERIDONE 3 MG: 3 TABLET, EXTENDED RELEASE ORAL at 10:09

## 2021-10-23 RX ADMIN — OXYCODONE HYDROCHLORIDE AND ACETAMINOPHEN 500 MG: 500 TABLET ORAL at 10:09

## 2021-10-23 RX ADMIN — ALPRAZOLAM 0.5 MG: 0.25 TABLET ORAL at 12:05

## 2021-10-23 RX ADMIN — DEXAMETHASONE SODIUM PHOSPHATE 6 MG: 4 INJECTION, SOLUTION INTRAMUSCULAR; INTRAVENOUS at 10:10

## 2021-10-23 ASSESSMENT — ENCOUNTER SYMPTOMS: BACK PAIN: 0

## 2021-10-23 ASSESSMENT — PAIN SCALES - GENERAL
PAINLEVEL_OUTOF10: 7
PAINLEVEL_OUTOF10: 5

## 2021-10-23 NOTE — PROGRESS NOTES
Nurse to nurse called to [facility]. Silvia Luu faxed to [phone].   Electronically signed by Ariana Rowe RN on 10/23/2021 at 12:42 PM

## 2021-10-23 NOTE — PROGRESS NOTES
Skip Aviva still has pt on schedule to  and transport to [facility] at 1300.     Electronically signed by Marianna Kraft RN on 10/23/2021 at 12:04 PM

## 2021-11-15 NOTE — DISCHARGE SUMMARY
Physician Discharge Summary     Patient ID:  Guy Saeed  [id number]  62 y.o.  1950    Admit date: 10/18/2021    Discharge date and time: 10/23/2021    Admission Diagnoses:   Patient Active Problem List   Diagnosis    Fibromyalgia    IBS (irritable bowel syndrome)    Major depression    Anxiety    Syncope    Orthostatic hypotension    Mixed hyperlipidemia    Anemia of chronic illness    Syncope and collapse    Anemia requiring transfusions    Pneumonia due to COVID-19 virus    Polypharmacy       Discharge Diagnoses: Covid    Consults: hematology/oncology    Procedures: None    Hospital Course: The patient is a 70 y.o. female of [place] [address],  who presents with complaint of a fall at home due to weakness. 77-year-old female recently discharged with a right hip replacement admitted to telemetry unit with     Covid  -does not meet criteria for remdesivir  -Decadron 6 mg daily  -Vitamin D, zinc, vitamin C  -Monitor O2 saturations and supplement oxygen to keep saturations greater than 93%, wean as necessary, incentive spirometer, no nebulizers  -continue to monitor  oxygenation, check ambulating pulse ox in preparation for discharge  -Droplet plus isolation  -inflammatory marker is elevated as a manifestation of Covid     Anemia  -Monitor H&H and transfuse to keep hgb greater than 7.  Hct/Hgb on admission 21.6/6.9 transfuse 1 unit continue to monitor hgb 8.7 today  -Patient follows with Dr. Roxanne Goodwin for chronic anemia    Patient discharged to [facility] in stable conditions with medications listed below. Patient instructed to follow up with all consultants and PCP within 3 weeks of discharge. No results for input(s): WBC, HGB, HCT, PLT in the last 72 hours. No results for input(s): NA, K, CL, CO2, BUN, CREATININE, CALCIUM in the last 72 hours.     Invalid input(s): GLU    CT CHEST WO CONTRAST    Result Date: 10/19/2021  EXAMINATION: CT OF THE CHEST WITHOUT CONTRAST 10/19/2021 10:13 am TECHNIQUE: CT of the chest was performed without the administration of intravenous contrast. Multiplanar reformatted images are provided for review. Dose modulation, iterative reconstruction, and/or weight based adjustment of the mA/kV was utilized to reduce the radiation dose to as low as reasonably achievable. COMPARISON: October 4, 2021 CT chest HISTORY: ORDERING SYSTEM PROVIDED HISTORY: pulmonary infiltrates TECHNOLOGIST PROVIDED HISTORY: Reason for exam:->pulmonary infiltrates FINDINGS: Mediastinum: Prominent paratracheal lymph no identified measuring 1 cm in short axis. The pulmonary trunk is normal in size. Lungs/pleura:   Small bilateral pleural effusions identified. Scattered patchy consolidations seen in the bilateral lungs. No pneumothorax. Upper Abdomen: No acute process identified Soft Tissues/Bones: No aggressive osseous lesions. Scattered, patchy consolidations in bilateral lungs may represent infectious process. Reactive lymph node in the mediastinum. CT THORACIC SPINE WO CONTRAST    Result Date: 10/18/2021  EXAMINATION: CT OF THE THORACIC SPINE WITHOUT CONTRAST  10/18/2021 9:14 am: TECHNIQUE: CT of the thoracic spine was performed without the administration of intravenous contrast. Multiplanar reformatted images are provided for review. Dose modulation, iterative reconstruction, and/or weight based adjustment of the mA/kV was utilized to reduce the radiation dose to as low as reasonably achievable. COMPARISON: None. HISTORY: ORDERING SYSTEM PROVIDED HISTORY: pain sp fall TECHNOLOGIST PROVIDED HISTORY: Reason for exam:->pain sp fall FINDINGS: BONES/ALIGNMENT: There is normal alignment of the spine. The vertebral body heights are maintained. No osseous destructive lesion is seen. DEGENERATIVE CHANGES: No gross spinal canal stenosis or bony neural foraminal narrowing of the thoracic spine. SOFT TISSUES: No paraspinal mass is seen. Partially imaged bilateral scattered opacities in the lungs. No traumatic injuries in thoracic spine Patchy opacities in bilateral lungs. Please refer to separate report from CT chest for further details. CT LUMBAR SPINE WO CONTRAST    Result Date: 10/18/2021  EXAMINATION: CT OF THE LUMBAR SPINE WITHOUT CONTRAST  10/18/2021 TECHNIQUE: CT of the lumbar spine was performed without the administration of intravenous contrast. Multiplanar reformatted images are provided for review. Adjustment of mA and/or kV according to patient size was utilized. Dose modulation, iterative reconstruction, and/or weight based adjustment of the mA/kV was utilized to reduce the radiation dose to as low as reasonably achievable. COMPARISON: None HISTORY: ORDERING SYSTEM PROVIDED HISTORY: PAIN TECHNOLOGIST PROVIDED HISTORY: Reason for exam:->fall Decision Support Exception - unselect if not a suspected or confirmed emergency medical condition->Emergency Medical Condition (MA) FINDINGS: BONES/ALIGNMENT:  There is mild, diffuse vertebral dextrocurvature. There are 5 lumbar type, non rib-bearing vertebra. There are no signs of acute fracture or dislocation. DEGENERATIVE CHANGES:  Moderate degenerative disc narrowing and spondylosis extends from L2-3 to the sacrum, most pronounced at L4-5 and L5-S1. There is mild degenerative retrolisthesis at L4-5. The facet joints are moderately degenerated and hypertrophied. The sacroiliac joints are morphologically normal, anatomically aligned, and display mld degenerative remodeling / nitrogen formation. L2-3 has a 3mm (grade 1) disc protrusion. The canal is moderately stenotic secondary to the protrusion and degenerated hypertrophied facets there is moderate bilateral foraminal stenosis. L3-4 is mildly stenotic secondary to degenerated, hypertrophied facets. L4-5 has a 5mm (grade 2) disc protrusion. The canal is moderately stenotic secondary to the protrusion and degenerated hypertrophied facets. there is moderate bilateral foraminal stenosis.  L5-S1 has mild canal and moderate foraminal stenosis secondary to degenerated, hypertrophied facets. SOFT TISSUES/RETROPERITONEUM: Surgical clips lie in the pertebral right para aortic region. The paravertebral soft tissues are normal. Vasculature: The abdominal aorta, IVC and their branches are normal aside from moderate aortic atherosclerotic vascular disease. The visualized abdominal/pelvic viscera is normal.     1.  No sign of acute traumatic injury. 2. Moderate mid and lower lumbar degenerative disc disease and spondylosis. See body of report for findings at specific levels. XR HIP 2-3 VW W PELVIS RIGHT    Result Date: 10/18/2021  EXAMINATION: ONE XRAY VIEW OF THE PELVIS AND TWO XRAY VIEWS RIGHT HIP 10/18/2021 8:23 am COMPARISON: Right hip radiograph 4 June 2006. HISTORY: ORDERING SYSTEM PROVIDED HISTORY: fall TECHNOLOGIST PROVIDED HISTORY: Reason for exam:->fall FINDINGS: Right hip arthroplasty is aligned anatomically with no abnormal bone or soft tissue findings. Anatomically aligned right hip arthroplasty with no unexpected findings. Discharge Exam:    HEENT: NCAT,  PERRLA, No JVD  Heart:  RRR, no murmurs, gallops, or rubs.   Lungs:  CTA bilaterally, no wheeze, rales or rhonchi  Abd: bowel sounds present, nontender, nondistended, no masses  Extrem:  No clubbing, cyanosis, or edema    Disposition: SNF     Patient Condition at Discharge: stable    Patient Instructions:      Medication List      START taking these medications    ascorbic acid 500 MG tablet  Commonly known as: VITAMIN C  Take 1 tablet by mouth daily for 6 doses     polyethylene glycol 17 g packet  Commonly known as: GLYCOLAX  Take 17 g by mouth daily as needed for Constipation     zinc sulfate 220 (50 Zn) MG capsule  Commonly known as: ZINCATE  Take 1 capsule by mouth daily for 6 doses        CONTINUE taking these medications    aspirin 325 MG tablet     [facility] Chew     Invega 3 MG extended release tablet  Generic drug: paliperidone     traZODone 100 MG tablet  Commonly known as: DESYREL  Take 1 tablet by mouth nightly. Trintellix 20 MG Tabs tablet  Generic drug: VORTIoxetine HBr        STOP taking these medications    traMADol 50 MG tablet  Commonly known as: ULTRAM     Xanax 0.5 MG tablet  Generic drug: ALPRAZolam        ASK your doctor about these medications    dexamethasone 0.5 MG tablet  Commonly known as: DECADRON  Take 12 tablets by mouth every 6 hours for 10 days  Ask about: Should I take this medication? Where to Get Your Medications      These medications were sent to [facility]-[address] [facility] [phone]  C/ Chris Bass [address] [place] [id number]    Phone: [phone]   · ascorbic acid 500 MG tablet  · dexamethasone 0.5 MG tablet  · polyethylene glycol 17 g packet     You can get these medications from any pharmacy    You don't need a prescription for these medications  · zinc sulfate 220 (50 Zn) MG capsule       Activity: activity as tolerated  Diet: cardiac diet and diabetic diet    Pt has been advised to: Follow-up with LEANDRA SUTTON III, DO in 1 week.   Follow-up with consultants as recommended by them    Note that over 30 minutes was spent in preparing discharge papers, discussing discharge with patient, medication review, etc.    Signed:  Bren Oneil MD  11/14/2021  8:40 PM

## 2023-03-30 ENCOUNTER — APPOINTMENT (OUTPATIENT)
Dept: CT IMAGING | Age: 73
End: 2023-03-30
Payer: MEDICARE

## 2023-03-30 ENCOUNTER — APPOINTMENT (OUTPATIENT)
Dept: GENERAL RADIOLOGY | Age: 73
End: 2023-03-30
Payer: MEDICARE

## 2023-03-30 ENCOUNTER — HOSPITAL ENCOUNTER (EMERGENCY)
Age: 73
Discharge: HOME | End: 2023-03-30
Attending: EMERGENCY MEDICINE
Payer: MEDICARE

## 2023-03-30 VITALS
WEIGHT: 163 LBS | HEART RATE: 68 BPM | DIASTOLIC BLOOD PRESSURE: 50 MMHG | SYSTOLIC BLOOD PRESSURE: 139 MMHG | HEIGHT: 60 IN | BODY MASS INDEX: 32 KG/M2 | OXYGEN SATURATION: 100 % | TEMPERATURE: 97.7 F | RESPIRATION RATE: 14 BRPM

## 2023-03-30 DIAGNOSIS — R42 LIGHTHEADEDNESS: Primary | ICD-10-CM

## 2023-03-30 LAB
ABO + RH BLD: NORMAL
ABO + RH BLD: NORMAL
ALBUMIN SERPL-MCNC: 4 G/DL (ref 3.5–5.2)
ALP SERPL-CCNC: 82 U/L (ref 35–104)
ALT SERPL-CCNC: 13 U/L (ref 0–32)
ANION GAP SERPL CALCULATED.3IONS-SCNC: 9 MMOL/L (ref 7–16)
AST SERPL-CCNC: 24 U/L (ref 0–31)
BASOPHILS # BLD: 0.03 E9/L (ref 0–0.2)
BASOPHILS NFR BLD: 1.1 % (ref 0–2)
BILIRUB SERPL-MCNC: 0.4 MG/DL (ref 0–1.2)
BLD GP AB SCN SERPL QL: NORMAL
BUN SERPL-MCNC: 27 MG/DL (ref 6–23)
CALCIUM SERPL-MCNC: 9.4 MG/DL (ref 8.6–10.2)
CHLORIDE SERPL-SCNC: 105 MMOL/L (ref 98–107)
CO2 SERPL-SCNC: 27 MMOL/L (ref 22–29)
CREAT SERPL-MCNC: 0.8 MG/DL (ref 0.5–1)
EKG ATRIAL RATE: 71 BPM
EKG P AXIS: 96 DEGREES
EKG P-R INTERVAL: 176 MS
EKG Q-T INTERVAL: 400 MS
EKG QRS DURATION: 106 MS
EKG QTC CALCULATION (BAZETT): 434 MS
EKG R AXIS: -25 DEGREES
EKG T AXIS: 43 DEGREES
EKG VENTRICULAR RATE: 71 BPM
EOSINOPHIL # BLD: 0.07 E9/L (ref 0.05–0.5)
EOSINOPHIL NFR BLD: 2.6 % (ref 0–6)
ERYTHROCYTE [DISTWIDTH] IN BLOOD BY AUTOMATED COUNT: 14.1 FL (ref 11.5–15)
GLUCOSE SERPL-MCNC: 102 MG/DL (ref 74–99)
HCT VFR BLD AUTO: 30.9 % (ref 34–48)
HGB BLD-MCNC: 10.3 G/DL (ref 11.5–15.5)
IMM GRANULOCYTES # BLD: 0.03 E9/L
IMM GRANULOCYTES NFR BLD: 1.1 % (ref 0–5)
LYMPHOCYTES # BLD: 1.01 E9/L (ref 1.5–4)
LYMPHOCYTES NFR BLD: 37 % (ref 20–42)
MCH RBC QN AUTO: 34.7 PG (ref 26–35)
MCHC RBC AUTO-ENTMCNC: 33.3 % (ref 32–34.5)
MCV RBC AUTO: 104 FL (ref 80–99.9)
MONOCYTES # BLD: 0.36 E9/L (ref 0.1–0.95)
MONOCYTES NFR BLD: 13.2 % (ref 2–12)
NEUTROPHILS # BLD: 1.23 E9/L (ref 1.8–7.3)
NEUTS SEG NFR BLD: 45 % (ref 43–80)
PLATELET # BLD AUTO: 127 E9/L (ref 130–450)
PMV BLD AUTO: 9.2 FL (ref 7–12)
POTASSIUM SERPL-SCNC: 4.5 MMOL/L (ref 3.5–5)
PROT SERPL-MCNC: 6.5 G/DL (ref 6.4–8.3)
RBC # BLD AUTO: 2.97 E12/L (ref 3.5–5.5)
SODIUM SERPL-SCNC: 141 MMOL/L (ref 132–146)
TROPONIN, HIGH SENSITIVITY: 33 NG/L (ref 0–9)
TROPONIN, HIGH SENSITIVITY: 37 NG/L (ref 0–9)
WBC # BLD: 2.7 E9/L (ref 4.5–11.5)

## 2023-03-30 PROCEDURE — 93005 ELECTROCARDIOGRAM TRACING: CPT | Performed by: EMERGENCY MEDICINE

## 2023-03-30 PROCEDURE — 2580000003 HC RX 258: Performed by: EMERGENCY MEDICINE

## 2023-03-30 PROCEDURE — 86850 RBC ANTIBODY SCREEN: CPT

## 2023-03-30 PROCEDURE — 86901 BLOOD TYPING SEROLOGIC RH(D): CPT

## 2023-03-30 PROCEDURE — 80053 COMPREHEN METABOLIC PANEL: CPT

## 2023-03-30 PROCEDURE — 93010 ELECTROCARDIOGRAM REPORT: CPT | Performed by: INTERNAL MEDICINE

## 2023-03-30 PROCEDURE — 84484 ASSAY OF TROPONIN QUANT: CPT

## 2023-03-30 PROCEDURE — 99285 EMERGENCY DEPT VISIT HI MDM: CPT

## 2023-03-30 PROCEDURE — 86900 BLOOD TYPING SEROLOGIC ABO: CPT

## 2023-03-30 PROCEDURE — 70450 CT HEAD/BRAIN W/O DYE: CPT

## 2023-03-30 PROCEDURE — 85025 COMPLETE CBC W/AUTO DIFF WBC: CPT

## 2023-03-30 PROCEDURE — 71045 X-RAY EXAM CHEST 1 VIEW: CPT

## 2023-03-30 RX ORDER — SODIUM CHLORIDE 0.9 % (FLUSH) 0.9 %
SYRINGE (ML) INJECTION
Status: DISCONTINUED
Start: 2023-03-30 | End: 2023-03-30 | Stop reason: HOSPADM

## 2023-03-30 RX ORDER — 0.9 % SODIUM CHLORIDE 0.9 %
500 INTRAVENOUS SOLUTION INTRAVENOUS ONCE
Status: COMPLETED | OUTPATIENT
Start: 2023-03-30 | End: 2023-03-30

## 2023-03-30 RX ADMIN — SODIUM CHLORIDE 500 ML: 9 INJECTION, SOLUTION INTRAVENOUS at 15:41

## 2023-03-30 ASSESSMENT — PAIN - FUNCTIONAL ASSESSMENT: PAIN_FUNCTIONAL_ASSESSMENT: NONE - DENIES PAIN

## 2023-03-30 NOTE — ED PROVIDER NOTES
[address]        Pt Name: Sandor Nava  MRN: [id number]  [place] 1950  Date of evaluation: 3/30/2023  Provider: Valentina Camacho DO  PCP: Jean Wing III, DO  Note Started: 3:01 PM EDT 3/30/23    CHIEF COMPLAINT       Chief Complaint   Patient presents with    Dizziness     Intermittent dizziness x 3 wks, hx anemia        HISTORY OF PRESENT ILLNESS: 1 or more Elements        Limitations to history : None    Sandor Nava is a 68 y.o. female who presents episodes of dizziness and lightheadedness intermittently over the past 3 weeks. Patient states she has a history of myelodysplasia, has required blood transfusion in the past, no transfusion recently. She states that she will intermittently feel lightheaded with a sensation that she may pass out. She denies any other associated symptoms, no chest pain or discomfort. She denies any recent fevers, no nausea/vomiting/diarrhea. She denies any headaches, no neck or back pain. Patient denies having any hematemesis, no dark stools, no bright red blood per rectum. Nursing Notes were all reviewed and agreed with or any disagreements were addressed in the HPI. REVIEW OF EXTERNAL NOTE :       No recent contributory notes in EMR    REVIEW OF SYSTEMS :      Positives and Pertinent negatives as per HPI.      SURGICAL HISTORY     Past Surgical History:   Procedure Laterality Date    BONE MARROW BIOPSY  09/12/2016    left buttocks area    CATARACT REMOVAL Bilateral     CHOLECYSTECTOMY  12/23/2013    COLONOSCOPY      HYSTERECTOMY      NECK SURGERY      x 3    OTHER SURGICAL HISTORY Left 12/12/2013    CYSTOSCOPY-RETROGRADE;LASER LITHOTRIPSY;LEFT J-STENT    HI CYSTO/URETERO/PYELOSCOPY W/LITHOTRIPSY Left 4/19/2018    CYSTOSCOPY RETROGRADE PYELOGRAM LASER LITHOTRIPSY  LEFT J-MARGE stent insertion performed by Haley Bill MD at [address] [place]       Medical Decision Making  72-year-old female presenting with intermittent episodes of lightheadedness. Patient states she has history of myelodysplasia and has had the need for blood transfusion in the past, nothing recently. She denies having any noticeable blood loss. Would have concern for possible anemia secondary to her chronic disease. Lower suspicion for cardiac etiology but possible. Low suspicion for intracranial abnormality. She arrives hemodynamically stable and well-appearing, neurologically intact. EKG shows no findings suggestive of acute ischemia or injury, unchanged from prior. Troponin 33 and 37, within patient's baseline of normal.  No leukocytosis, hemoglobin 10.3 is above patient's baseline. Metabolic panel is within acceptable limits. Chest x-ray and CT head showed no acute abnormality. Patient's orthostatics are unremarkable. Patient is comfortable with discharge and PCP follow-up. Instruction to return for any changes in condition or new symptoms. Problems Addressed:  Lightheadedness: acute illness or injury    Amount and/or Complexity of Data Reviewed  External Data Reviewed: ECG. Labs: ordered. Decision-making details documented in ED Course. Radiology: ordered. Decision-making details documented in ED Course. ECG/medicine tests: ordered and independent interpretation performed. Decision-making details documented in ED Course. Risk  Prescription drug management. EKG is ordered to have documentation of patient's current rhythm, and to rule out any obvious acute cardiac illnesses such as ACS. Additionally, QT interval may be of use in decision making regarding any medications administered here in the ED. Patient is placed on cardiac monitor and continuous pulse ox for monitoring. CBC is ordered to evaluate for any signs of infection or inflammation by obtaining a WBC count, or any signs of acute anemia by interpreting hemoglobin.  CMP was ordered to

## 2024-01-13 ENCOUNTER — HOSPITAL ENCOUNTER (EMERGENCY)
Age: 74
Discharge: HOME | End: 2024-01-13
Payer: MEDICARE

## 2024-01-13 ENCOUNTER — APPOINTMENT (OUTPATIENT)
Dept: CT IMAGING | Age: 74
End: 2024-01-13
Payer: MEDICARE

## 2024-01-13 VITALS
WEIGHT: 163 LBS | HEART RATE: 70 BPM | TEMPERATURE: 97.7 F | HEIGHT: 60 IN | BODY MASS INDEX: 32 KG/M2 | SYSTOLIC BLOOD PRESSURE: 137 MMHG | OXYGEN SATURATION: 96 % | DIASTOLIC BLOOD PRESSURE: 64 MMHG | RESPIRATION RATE: 16 BRPM

## 2024-01-13 DIAGNOSIS — N30.00 ACUTE CYSTITIS WITHOUT HEMATURIA: Primary | ICD-10-CM

## 2024-01-13 DIAGNOSIS — R10.9 RIGHT FLANK PAIN: ICD-10-CM

## 2024-01-13 LAB
ALBUMIN SERPL-MCNC: 4 G/DL (ref 3.5–5.2)
ALP SERPL-CCNC: 120 U/L (ref 35–104)
ALT SERPL-CCNC: 27 U/L (ref 0–32)
ANION GAP SERPL CALCULATED.3IONS-SCNC: 9 MMOL/L (ref 7–16)
AST SERPL-CCNC: 31 U/L (ref 0–31)
BACTERIA URNS QL MICRO: ABNORMAL
BASOPHILS # BLD: 0.05 K/UL (ref 0–0.2)
BASOPHILS NFR BLD: 1 % (ref 0–2)
BILIRUB SERPL-MCNC: 0.3 MG/DL (ref 0–1.2)
BILIRUB UR QL STRIP: NEGATIVE
BUN SERPL-MCNC: 22 MG/DL (ref 6–23)
CALCIUM SERPL-MCNC: 9.5 MG/DL (ref 8.6–10.2)
CHLORIDE SERPL-SCNC: 104 MMOL/L (ref 98–107)
CLARITY UR: CLEAR
CO2 SERPL-SCNC: 25 MMOL/L (ref 22–29)
COLOR UR: YELLOW
CREAT SERPL-MCNC: 0.7 MG/DL (ref 0.5–1)
EOSINOPHIL # BLD: 0.23 K/UL (ref 0.05–0.5)
EOSINOPHILS RELATIVE PERCENT: 5 % (ref 0–6)
ERYTHROCYTE [DISTWIDTH] IN BLOOD BY AUTOMATED COUNT: 14.6 % (ref 11.5–15)
GFR SERPL CREATININE-BSD FRML MDRD: >60 ML/MIN/1.73M2
GLUCOSE SERPL-MCNC: 88 MG/DL (ref 74–99)
GLUCOSE UR STRIP-MCNC: NEGATIVE MG/DL
HCT VFR BLD AUTO: 31.6 % (ref 34–48)
HGB BLD-MCNC: 10.2 G/DL (ref 11.5–15.5)
HGB UR QL STRIP.AUTO: NEGATIVE
IMM GRANULOCYTES # BLD AUTO: 0.06 K/UL (ref 0–0.58)
IMM GRANULOCYTES NFR BLD: 1 % (ref 0–5)
KETONES UR STRIP-MCNC: ABNORMAL MG/DL
LEUKOCYTE ESTERASE UR QL STRIP: ABNORMAL
LIPASE SERPL-CCNC: 68 U/L (ref 13–60)
LYMPHOCYTES NFR BLD: 1.38 K/UL (ref 1.5–4)
LYMPHOCYTES RELATIVE PERCENT: 30 % (ref 20–42)
MCH RBC QN AUTO: 33.3 PG (ref 26–35)
MCHC RBC AUTO-ENTMCNC: 32.3 G/DL (ref 32–34.5)
MCV RBC AUTO: 103.3 FL (ref 80–99.9)
MONOCYTES NFR BLD: 0.64 K/UL (ref 0.1–0.95)
MONOCYTES NFR BLD: 14 % (ref 2–12)
NEUTROPHILS NFR BLD: 49 % (ref 43–80)
NEUTS SEG NFR BLD: 2.31 K/UL (ref 1.8–7.3)
NITRITE UR QL STRIP: POSITIVE
PH UR STRIP: 6 [PH] (ref 5–9)
PLATELET # BLD AUTO: 150 K/UL (ref 130–450)
PMV BLD AUTO: 8.7 FL (ref 7–12)
POTASSIUM SERPL-SCNC: 4.6 MMOL/L (ref 3.5–5)
PROT SERPL-MCNC: 6.8 G/DL (ref 6.4–8.3)
PROT UR STRIP-MCNC: NEGATIVE MG/DL
RBC # BLD AUTO: 3.06 M/UL (ref 3.5–5.5)
RBC #/AREA URNS HPF: ABNORMAL /HPF
SODIUM SERPL-SCNC: 138 MMOL/L (ref 132–146)
SP GR UR STRIP: 1.01 (ref 1–1.03)
UROBILINOGEN UR STRIP-ACNC: 0.2 EU/DL (ref 0–1)
WBC #/AREA URNS HPF: ABNORMAL /HPF
WBC OTHER # BLD: 4.7 K/UL (ref 4.5–11.5)

## 2024-01-13 PROCEDURE — 74176 CT ABD & PELVIS W/O CONTRAST: CPT

## 2024-01-13 PROCEDURE — 96372 THER/PROPH/DIAG INJ SC/IM: CPT

## 2024-01-13 PROCEDURE — 72131 CT LUMBAR SPINE W/O DYE: CPT

## 2024-01-13 PROCEDURE — 99284 EMERGENCY DEPT VISIT MOD MDM: CPT

## 2024-01-13 PROCEDURE — 81001 URINALYSIS AUTO W/SCOPE: CPT

## 2024-01-13 PROCEDURE — 6360000002 HC RX W HCPCS: Performed by: NURSE PRACTITIONER

## 2024-01-13 PROCEDURE — 96374 THER/PROPH/DIAG INJ IV PUSH: CPT

## 2024-01-13 PROCEDURE — 85025 COMPLETE CBC W/AUTO DIFF WBC: CPT

## 2024-01-13 PROCEDURE — 80053 COMPREHEN METABOLIC PANEL: CPT

## 2024-01-13 PROCEDURE — 83690 ASSAY OF LIPASE: CPT

## 2024-01-13 RX ORDER — KETOROLAC TROMETHAMINE 30 MG/ML
15 INJECTION, SOLUTION INTRAMUSCULAR; INTRAVENOUS ONCE
Status: COMPLETED | OUTPATIENT
Start: 2024-01-13 | End: 2024-01-13

## 2024-01-13 RX ORDER — CEFDINIR 300 MG/1
300 CAPSULE ORAL 2 TIMES DAILY
Qty: 14 CAPSULE | Refills: 0 | Status: SHIPPED | OUTPATIENT
Start: 2024-01-13 | End: 2024-01-20

## 2024-01-13 RX ORDER — ORPHENADRINE CITRATE 30 MG/ML
60 INJECTION INTRAMUSCULAR; INTRAVENOUS ONCE
Status: COMPLETED | OUTPATIENT
Start: 2024-01-13 | End: 2024-01-13

## 2024-01-13 RX ADMIN — ORPHENADRINE CITRATE 60 MG: 60 INJECTION INTRAMUSCULAR; INTRAVENOUS at 13:42

## 2024-01-13 RX ADMIN — KETOROLAC TROMETHAMINE 15 MG: 30 INJECTION, SOLUTION INTRAMUSCULAR; INTRAVENOUS at 13:43

## 2024-01-13 ASSESSMENT — LIFESTYLE VARIABLES
HOW MANY STANDARD DRINKS CONTAINING ALCOHOL DO YOU HAVE ON A TYPICAL DAY: PATIENT DOES NOT DRINK
HOW OFTEN DO YOU HAVE A DRINK CONTAINING ALCOHOL: NEVER

## 2024-01-13 NOTE — ED PROVIDER NOTES
Independent OPAL Visit.      [facility]  Department of Emergency Medicine   ED  Encounter Note  Admit Date/RoomTime: 2024  1:17 PM  ED Room:     NAME: Annmarie Zhu  : 1950  MRN: [id number]     Chief Complaint:  Flank Pain (X 2 weeks)    History of Present Illness       Annmarie Zhu is a 73 y.o. old female with a prior history of recurrent intermittent self limited episodes of low back pain, presents to the emergency department by private vehicle, for non-traumatic right sided back pain that has been ongoing for the last 2 weeks.  States the severity comes and goes.  Denies injuries.  No loss of bowel or bladder control.  No saddle paresthesias.  Denies urinary symptoms such as urgency, dysuria or hematuria.  Denies fevers, chills, nausea, vomiting, abdominal pain, chest pain, shortness of breath.  She says pain gets worse with certain movements, especially going from a sitting to standing position.     ROS   Pertinent positives and negatives are stated within HPI, all other systems reviewed and are negative.    Past Medical History:  has a past medical history of Anemia, Anxiety, Cancer (HCC), Depression, Dyslipidemia, Dysrhythmia, High triglycerides, IBS (irritable bowel syndrome), Kidney stones, Muscle spasm, Neck injury, and Syncope and collapse.    Surgical History:  has a past surgical history that includes Neck surgery; Hysterectomy; other surgical history (Left, 2013); Cholecystectomy (2013); Tonsillectomy; bone marrow biopsy (2016); Cataract removal (Bilateral); Colonoscopy; Upper gastrointestinal endoscopy; and pr cysto w/ureteroscopy w/lithotripsy (Left, 2018).    Social History:  reports that she has never smoked. She has never used smokeless tobacco. She reports that she does not drink alcohol and does not use drugs.    Family History: family history includes Heart Disease in her father; Lung Cancer in her sister; Other in her mother.  Additional Contrast? None   Final Result   1. No acute intra-abdominal or pelvic abnormality.  No obstructing uropathy   or hydronephrosis.   2. Moderate colonic stool burden greatest in the distal rectosigmoid colon.   Concerning for stasis of retention.   3. Small hiatal hernia.   4. Right hip arthroplasty in place with associated streak artifact.   5. Cholecystectomy.             ED Course / Medical Decision Making     Medications   ketorolac (TORADOL) injection 15 mg (15 mg IntraVENous Given 24 1343)   orphenadrine (NORFLEX) injection 60 mg (60 mg IntraMUSCular Given 24 1342)     ED Course as of 24 1623   Sat 2024   1620 Feels better, pain resolved, discussed discharge plan, follow up, home care and return precautions.   [JL]      ED Course User Index  [JL] Rey Dior APRN - CNP       Consult(s):   None    Procedure(s):   none    Medical Decision Makin y.o. old female with a prior history of recurrent intermittent self limited episodes of low back pain, presents to the emergency department by private vehicle, for non-traumatic right sided back pain that has been ongoing for the last 2 weeks.  States the severity comes and goes.  Denies injuries.  No loss of bowel or bladder control.  No saddle paresthesias.  Denies urinary symptoms such as urgency, dysuria or hematuria.  Denies fevers, chills, nausea, vomiting, abdominal pain, chest pain, shortness of breath.  She says pain gets worse with certain movements, especially going from a sitting to standing position.     Differential diagnosis but not limited to:  lumbar strain, DDD, kidney stone, UTI, pyelonephritis, SBO, diverticulitis    Awake, alert, oriented, non-toxic appearing, no distress.  Vital signs stable.  Breath sounds clear bilaterally, respirations regular, non-labored, no tachypnea.  Abdomen soft, non-tender.  No true flank pain.  Pain in right lower back region.  Worse with movements.    CMP was reassuring, normal

## 2024-01-13 NOTE — ED NOTES
Department of Emergency Medicine  FIRST PROVIDER TRIAGE NOTE             Independent MLP           1/13/24  12:37 PM EST    Date of Encounter: 1/13/24   MRN: [id number]      HPI: Annmarie Zhu is a 73 y.o. female who presents to the ED for Flank Pain (X 2 weeks)     RIGHT FLANK PAIN. DENIES URINARY SYMPTOMS. STANDING FROM A SITTING POSITION MAKES THE PAIN WORSE AT TIMES.     ROS: Negative for cp or sob.    PE: Gen Appearance/Constitutional: alert  HEENT: NC/NT. PERRLA,  Airway patent.     Initial Plan of Care: All treatment areas with department are currently occupied. Plan to order/Initiate the following while awaiting opening in ED.  Initiate Treatment-Testing, Proceed toTreatment Area When Bed Available for ED Attending/MLP to Continue Care    Electronically signed by BETTY Betts CNP   DD: 1/13/24      Rey Dior APRN - CNP  01/13/24 1232

## 2024-01-13 NOTE — DISCHARGE INSTRUCTIONS
Alternate Tylenol and ibuprofen for pain.  One capful of miralax daily for constipation.   Take the antibiotics until completed.  Drink plenty of fluids.  Follow-up with your primary care doctor on Monday.  Return here for worsening symptoms.

## 2024-04-17 ENCOUNTER — HOSPITAL ENCOUNTER (OUTPATIENT)
Dept: CT IMAGING | Age: 74
Discharge: HOME | End: 2024-04-19
Payer: MEDICARE

## 2024-04-17 DIAGNOSIS — R52 PAIN: ICD-10-CM

## 2024-04-17 PROCEDURE — 73700 CT LOWER EXTREMITY W/O DYE: CPT

## 2024-04-24 ENCOUNTER — HOSPITAL ENCOUNTER (OUTPATIENT)
Dept: PREADMISSION TESTING | Age: 74
Discharge: HOME | End: 2024-04-24
Payer: MEDICARE

## 2024-04-24 VITALS
OXYGEN SATURATION: 100 % | BODY MASS INDEX: 32.98 KG/M2 | TEMPERATURE: 97.8 F | SYSTOLIC BLOOD PRESSURE: 156 MMHG | HEART RATE: 66 BPM | DIASTOLIC BLOOD PRESSURE: 68 MMHG | WEIGHT: 168 LBS | HEIGHT: 60 IN | RESPIRATION RATE: 20 BRPM

## 2024-04-24 LAB
ANION GAP SERPL CALCULATED.3IONS-SCNC: 10 MMOL/L (ref 7–16)
BUN SERPL-MCNC: 28 MG/DL (ref 6–23)
CALCIUM SERPL-MCNC: 9.1 MG/DL (ref 8.6–10.2)
CHLORIDE SERPL-SCNC: 103 MMOL/L (ref 98–107)
CO2 SERPL-SCNC: 23 MMOL/L (ref 22–29)
CREAT SERPL-MCNC: 0.7 MG/DL (ref 0.5–1)
ERYTHROCYTE [DISTWIDTH] IN BLOOD BY AUTOMATED COUNT: 14.5 % (ref 11.5–15)
GFR SERPL CREATININE-BSD FRML MDRD: >90 ML/MIN/1.73M2
GLUCOSE SERPL-MCNC: 92 MG/DL (ref 74–99)
HCT VFR BLD AUTO: 31 % (ref 34–48)
HGB BLD-MCNC: 9.8 G/DL (ref 11.5–15.5)
MCH RBC QN AUTO: 34 PG (ref 26–35)
MCHC RBC AUTO-ENTMCNC: 31.6 G/DL (ref 32–34.5)
MCV RBC AUTO: 107.6 FL (ref 80–99.9)
PLATELET # BLD AUTO: 131 K/UL (ref 130–450)
PMV BLD AUTO: 9 FL (ref 7–12)
POTASSIUM SERPL-SCNC: 4.5 MMOL/L (ref 3.5–5)
RBC # BLD AUTO: 2.88 M/UL (ref 3.5–5.5)
SODIUM SERPL-SCNC: 136 MMOL/L (ref 132–146)
WBC OTHER # BLD: 3.4 K/UL (ref 4.5–11.5)

## 2024-04-24 PROCEDURE — 80048 BASIC METABOLIC PNL TOTAL CA: CPT

## 2024-04-24 PROCEDURE — 36415 COLL VENOUS BLD VENIPUNCTURE: CPT

## 2024-04-24 PROCEDURE — 87081 CULTURE SCREEN ONLY: CPT

## 2024-04-24 PROCEDURE — 85027 COMPLETE CBC AUTOMATED: CPT

## 2024-04-24 RX ORDER — ASPIRIN 81 MG/1
81 TABLET ORAL DAILY
COMMUNITY

## 2024-04-24 RX ORDER — UBIDECARENONE 75 MG
50 CAPSULE ORAL DAILY
COMMUNITY

## 2024-04-24 RX ORDER — CHLORAL HYDRATE 500 MG
1 CAPSULE ORAL DAILY
COMMUNITY

## 2024-04-24 RX ORDER — TRAMADOL HYDROCHLORIDE 50 MG/1
50 TABLET ORAL EVERY 6 HOURS PRN
COMMUNITY
Start: 2024-04-15

## 2024-04-24 RX ORDER — ALPRAZOLAM 0.5 MG/1
0.5 TABLET ORAL 3 TIMES DAILY
COMMUNITY
Start: 2024-04-17

## 2024-04-24 ASSESSMENT — HOOS JR
SITTING: MODERATE
HOOS JR TOTAL INTERVAL SCORE: 32.735
WALKING ON UNEVEN SURFACE: EXTREME
GOING UP OR DOWN STAIRS: EXTREME
BENDING TO THE FLOOR TO PICK UP OBJECT: SEVERE
HOOS JR RAW SCORE: 18
LYING IN BED (TURNING OVER, MAINTAINING HIP POSITION): MODERATE
RISING FROM SITTING: SEVERE
HOOS JR RAW SCORE: 18

## 2024-04-24 ASSESSMENT — PAIN DESCRIPTION - PAIN TYPE: TYPE: CHRONIC PAIN

## 2024-04-24 ASSESSMENT — PAIN DESCRIPTION - FREQUENCY: FREQUENCY: CONTINUOUS

## 2024-04-24 ASSESSMENT — PAIN - FUNCTIONAL ASSESSMENT: PAIN_FUNCTIONAL_ASSESSMENT: PREVENTS OR INTERFERES SOME ACTIVE ACTIVITIES AND ADLS

## 2024-04-24 ASSESSMENT — PAIN DESCRIPTION - ORIENTATION: ORIENTATION: LEFT

## 2024-04-24 ASSESSMENT — PAIN SCALES - GENERAL: PAINLEVEL_OUTOF10: 8

## 2024-04-24 ASSESSMENT — PAIN DESCRIPTION - ONSET: ONSET: ON-GOING

## 2024-04-24 ASSESSMENT — PAIN DESCRIPTION - LOCATION: LOCATION: HIP

## 2024-04-24 ASSESSMENT — PAIN DESCRIPTION - DESCRIPTORS: DESCRIPTORS: ACHING;DISCOMFORT;DULL

## 2024-04-24 NOTE — PROGRESS NOTES
CBC, BMP results faxed to Dr. Alexandra, Dr. SHASHI Orlando, Dr. Mathew for review prior to DOS 5/1/24

## 2024-04-24 NOTE — PROGRESS NOTES
[facility] PRE-ADMISSION TESTING INSTRUCTIONS    The Preadmission Testing patient is instructed accordingly using the following criteria (check applicable):    ARRIVAL INSTRUCTIONS:  [x] Parking the day of Surgery is located in the Main Entrance lot.  Upon entering the door, make an immediate right to the surgery reception desk    [x] Bring photo ID and insurance card    [] Bring in a copy of Living will or Durable Power of  papers.    [x] Please be sure to arrange for responsible adult to provide transportation to and from the hospital    [x] Please arrange for responsible adult to be with you for the 24 hour period post procedure due to having anesthesia    [x] If you awake am of surgery not feeling well or have temperature >100 please call [phone]    GENERAL INSTRUCTIONS:    [x] Follow instructions for hydration that have been provided to you at your Pre-Admission Visit. Solid food until midnight then clear liquids. No gum, candy or mints.    [x] You may brush your teeth, but do not swallow any water    [x] Take medications as instructed with 1-2 oz of water    [x] Stop herbal supplements and vitamins 5 days prior to procedure    [x] Follow preop dosing of blood thinners per physician instructions> NO HOLD ASPIRIN PER SUGEON    [x] No tobacco products within 24 hours of surgery     [x] No alcohol or illegal drug use within 24 hours of surgery.    [x] Jewelry, body piercing's, eyeglasses, contact lenses and dentures are not permitted into surgery (bring cases)      [x] Please do not wear any nail polish, make up or hair products on the day of surgery    [x] You can expect a call the business day prior to procedure to notify you if your arrival time changes    [x] If you receive a survey after surgery we would greatly appreciate your comments    [x] Please notify surgeon if you develop any illness between now and time of surgery (cold, cough, sore throat, fever, nausea,

## 2024-04-25 LAB
MICROORGANISM SPEC CULT: ABNORMAL
SPECIMEN DESCRIPTION: ABNORMAL

## 2024-04-26 NOTE — PROGRESS NOTES
Alexus at Dr. Alexandra office verifed receipt of MRSA nasal culture results and gave to Nurses for treatment.

## 2024-04-30 ENCOUNTER — ANESTHESIA EVENT (OUTPATIENT)
Dept: OPERATING ROOM | Age: 74
End: 2024-04-30
Payer: MEDICARE

## 2024-04-30 NOTE — H&P
History and Physical  KClara Alexandra MD    Chief Complaint       Annmarie, a 74 year old female, is seen today for a left hip pain and weakness for a duration of  about 3 months.   Patient rates her pain as 8/10.  Patient states that rest, medication makes the pain better and WB makes the pain worse.  She arrived today using a wheelchair.  Xrays will be done in office today.  Annmarie was not seen by another provider for this condition.     History of present illness   Annmarie is a 74 Years Old Female who presents to the office for evaluation of left hip pain worsening over the last 3 months. This began without injury. She has been having left groin pain and instability. She has trouble walking and uses a cane. The pain has become a significant quality of life issue.     Past medical history is significant for myodysplasia, she follows with a hematologist. She did have a RTHA in 2021 and is doing well with this. She did develop low hemoglobin directly after her surgery and required multiple blood transfusions so she is nervous about surgery. She also says that she lost a lot of her hair after her right hip surgery.   Currently pain is 8 out of 10.  Pain is localized to the groin, thigh. Symptoms include instability, stiffness, night pain. Pain is worse with activity, stairs, walking and improves with rest.  Previous and current medications include tramadol  Previous and current treatments include cane, wheel chair    Physical Exam   Awake, alert, oriented x3  No acute distress  Well developed, well nourished  Eyes appear Normal  No obvious abnormalities  No lower extremity edema present  Respiratory effort: non-labored  Skin Appearance: Normal    Right Hip Exam   Skin Exam:    prior incision  Painful ROM:   no  Normal range of motion  Neurovascular SILT L1-S1, TA/EHL/GS intact, +2 DP warm well perfused, calf soft and nontender    Left Hip Exam   Skin Exam:   skin exam  Pain with log roll:   yes  Flexion

## 2024-05-01 ENCOUNTER — HOSPITAL ENCOUNTER (OUTPATIENT)
Age: 74
Setting detail: OBSERVATION
Discharge: HOME | End: 2024-05-03
Attending: ORTHOPAEDIC SURGERY | Admitting: ORTHOPAEDIC SURGERY
Payer: MEDICARE

## 2024-05-01 ENCOUNTER — ANESTHESIA (OUTPATIENT)
Dept: OPERATING ROOM | Age: 74
End: 2024-05-01
Payer: MEDICARE

## 2024-05-01 ENCOUNTER — APPOINTMENT (OUTPATIENT)
Dept: GENERAL RADIOLOGY | Age: 74
End: 2024-05-01
Attending: ORTHOPAEDIC SURGERY
Payer: MEDICARE

## 2024-05-01 DIAGNOSIS — Z96.642 AFTERCARE FOLLOWING LEFT HIP JOINT REPLACEMENT SURGERY: Primary | ICD-10-CM

## 2024-05-01 DIAGNOSIS — M16.12 PRIMARY OSTEOARTHRITIS OF LEFT HIP: ICD-10-CM

## 2024-05-01 DIAGNOSIS — Z47.1 AFTERCARE FOLLOWING LEFT HIP JOINT REPLACEMENT SURGERY: Primary | ICD-10-CM

## 2024-05-01 PROCEDURE — 2500000003 HC RX 250 WO HCPCS: Performed by: ORTHOPAEDIC SURGERY

## 2024-05-01 PROCEDURE — 97165 OT EVAL LOW COMPLEX 30 MIN: CPT

## 2024-05-01 PROCEDURE — 6360000002 HC RX W HCPCS: Performed by: ORTHOPAEDIC SURGERY

## 2024-05-01 PROCEDURE — 73502 X-RAY EXAM HIP UNI 2-3 VIEWS: CPT

## 2024-05-01 PROCEDURE — 7100000000 HC PACU RECOVERY - FIRST 15 MIN: Performed by: ORTHOPAEDIC SURGERY

## 2024-05-01 PROCEDURE — 6360000002 HC RX W HCPCS

## 2024-05-01 PROCEDURE — 2580000003 HC RX 258: Performed by: ORTHOPAEDIC SURGERY

## 2024-05-01 PROCEDURE — 97530 THERAPEUTIC ACTIVITIES: CPT

## 2024-05-01 PROCEDURE — 6360000002 HC RX W HCPCS: Performed by: ANESTHESIOLOGY

## 2024-05-01 PROCEDURE — 3600000005 HC SURGERY LEVEL 5 BASE: Performed by: ORTHOPAEDIC SURGERY

## 2024-05-01 PROCEDURE — 2720000010 HC SURG SUPPLY STERILE: Performed by: ORTHOPAEDIC SURGERY

## 2024-05-01 PROCEDURE — 2580000003 HC RX 258

## 2024-05-01 PROCEDURE — 3700000000 HC ANESTHESIA ATTENDED CARE: Performed by: ORTHOPAEDIC SURGERY

## 2024-05-01 PROCEDURE — 3700000001 HC ADD 15 MINUTES (ANESTHESIA): Performed by: ORTHOPAEDIC SURGERY

## 2024-05-01 PROCEDURE — 3600000015 HC SURGERY LEVEL 5 ADDTL 15MIN: Performed by: ORTHOPAEDIC SURGERY

## 2024-05-01 PROCEDURE — 2709999900 HC NON-CHARGEABLE SUPPLY: Performed by: ORTHOPAEDIC SURGERY

## 2024-05-01 PROCEDURE — 2500000003 HC RX 250 WO HCPCS

## 2024-05-01 PROCEDURE — G0378 HOSPITAL OBSERVATION PER HR: HCPCS

## 2024-05-01 PROCEDURE — C1776 JOINT DEVICE (IMPLANTABLE): HCPCS | Performed by: ORTHOPAEDIC SURGERY

## 2024-05-01 PROCEDURE — 6370000000 HC RX 637 (ALT 250 FOR IP)

## 2024-05-01 PROCEDURE — 6370000000 HC RX 637 (ALT 250 FOR IP): Performed by: ORTHOPAEDIC SURGERY

## 2024-05-01 PROCEDURE — A4216 STERILE WATER/SALINE, 10 ML: HCPCS | Performed by: ORTHOPAEDIC SURGERY

## 2024-05-01 PROCEDURE — 7100000001 HC PACU RECOVERY - ADDTL 15 MIN: Performed by: ORTHOPAEDIC SURGERY

## 2024-05-01 DEVICE — TRIDENT II TRITANIUM CLUSTER 50D
Type: IMPLANTABLE DEVICE | Site: HIP | Status: FUNCTIONAL
Brand: TRIDENT II

## 2024-05-01 DEVICE — 127 DEGREE NECK ANGLE HIP STEM
Type: IMPLANTABLE DEVICE | Site: HIP | Status: FUNCTIONAL
Brand: ACCOLADE

## 2024-05-01 DEVICE — CERAMIC V40 FEMORAL HEAD
Type: IMPLANTABLE DEVICE | Site: HIP | Status: FUNCTIONAL
Brand: BIOLOX

## 2024-05-01 DEVICE — TRIDENT X3 0 DEGREE POLYETHYLENE INSERT
Type: IMPLANTABLE DEVICE | Site: HIP | Status: FUNCTIONAL
Brand: TRIDENT X3 INSERT

## 2024-05-01 RX ORDER — PROPOFOL 10 MG/ML
INJECTION, EMULSION INTRAVENOUS CONTINUOUS PRN
Status: DISCONTINUED | OUTPATIENT
Start: 2024-05-01 | End: 2024-05-01 | Stop reason: SDUPTHER

## 2024-05-01 RX ORDER — LABETALOL HYDROCHLORIDE 5 MG/ML
10 INJECTION, SOLUTION INTRAVENOUS
Status: DISCONTINUED | OUTPATIENT
Start: 2024-05-01 | End: 2024-05-01 | Stop reason: HOSPADM

## 2024-05-01 RX ORDER — TRANEXAMIC ACID 10 MG/ML
1000 INJECTION, SOLUTION INTRAVENOUS
Status: COMPLETED | OUTPATIENT
Start: 2024-05-01 | End: 2024-05-01

## 2024-05-01 RX ORDER — ASPIRIN 325 MG
325 TABLET, DELAYED RELEASE (ENTERIC COATED) ORAL DAILY
Status: DISCONTINUED | OUTPATIENT
Start: 2024-05-01 | End: 2024-05-03 | Stop reason: HOSPADM

## 2024-05-01 RX ORDER — SENNA AND DOCUSATE SODIUM 50; 8.6 MG/1; MG/1
1 TABLET, FILM COATED ORAL 2 TIMES DAILY
Status: DISCONTINUED | OUTPATIENT
Start: 2024-05-01 | End: 2024-05-03 | Stop reason: HOSPADM

## 2024-05-01 RX ORDER — SODIUM CHLORIDE 0.9 % (FLUSH) 0.9 %
5-40 SYRINGE (ML) INJECTION PRN
Status: DISCONTINUED | OUTPATIENT
Start: 2024-05-01 | End: 2024-05-01 | Stop reason: HOSPADM

## 2024-05-01 RX ORDER — GLYCOPYRROLATE 0.2 MG/ML
INJECTION INTRAMUSCULAR; INTRAVENOUS PRN
Status: DISCONTINUED | OUTPATIENT
Start: 2024-05-01 | End: 2024-05-01 | Stop reason: SDUPTHER

## 2024-05-01 RX ORDER — BUPIVACAINE HYDROCHLORIDE 7.5 MG/ML
INJECTION, SOLUTION INTRASPINAL
Status: COMPLETED | OUTPATIENT
Start: 2024-05-01 | End: 2024-05-01

## 2024-05-01 RX ORDER — HYDRALAZINE HYDROCHLORIDE 20 MG/ML
10 INJECTION INTRAMUSCULAR; INTRAVENOUS
Status: DISCONTINUED | OUTPATIENT
Start: 2024-05-01 | End: 2024-05-01 | Stop reason: HOSPADM

## 2024-05-01 RX ORDER — ONDANSETRON 2 MG/ML
4 INJECTION INTRAMUSCULAR; INTRAVENOUS
Status: DISCONTINUED | OUTPATIENT
Start: 2024-05-01 | End: 2024-05-01 | Stop reason: HOSPADM

## 2024-05-01 RX ORDER — ONDANSETRON 2 MG/ML
INJECTION INTRAMUSCULAR; INTRAVENOUS PRN
Status: DISCONTINUED | OUTPATIENT
Start: 2024-05-01 | End: 2024-05-01 | Stop reason: SDUPTHER

## 2024-05-01 RX ORDER — MEPERIDINE HYDROCHLORIDE 25 MG/ML
12.5 INJECTION INTRAMUSCULAR; INTRAVENOUS; SUBCUTANEOUS EVERY 5 MIN PRN
Status: DISCONTINUED | OUTPATIENT
Start: 2024-05-01 | End: 2024-05-01 | Stop reason: HOSPADM

## 2024-05-01 RX ORDER — ALPRAZOLAM 0.25 MG/1
0.5 TABLET ORAL 3 TIMES DAILY
Status: DISCONTINUED | OUTPATIENT
Start: 2024-05-01 | End: 2024-05-03 | Stop reason: HOSPADM

## 2024-05-01 RX ORDER — VANCOMYCIN HYDROCHLORIDE 1 G/20ML
INJECTION, POWDER, LYOPHILIZED, FOR SOLUTION INTRAVENOUS PRN
Status: DISCONTINUED | OUTPATIENT
Start: 2024-05-01 | End: 2024-05-01 | Stop reason: ALTCHOICE

## 2024-05-01 RX ORDER — EPHEDRINE SULFATE/0.9% NACL/PF 50 MG/5 ML
SYRINGE (ML) INTRAVENOUS PRN
Status: DISCONTINUED | OUTPATIENT
Start: 2024-05-01 | End: 2024-05-01 | Stop reason: SDUPTHER

## 2024-05-01 RX ORDER — MIDAZOLAM HYDROCHLORIDE 2 MG/2ML
2 INJECTION, SOLUTION INTRAMUSCULAR; INTRAVENOUS
Status: DISCONTINUED | OUTPATIENT
Start: 2024-05-01 | End: 2024-05-01 | Stop reason: HOSPADM

## 2024-05-01 RX ORDER — SODIUM CHLORIDE 0.9 % (FLUSH) 0.9 %
5-40 SYRINGE (ML) INJECTION PRN
Status: DISCONTINUED | OUTPATIENT
Start: 2024-05-01 | End: 2024-05-03 | Stop reason: HOSPADM

## 2024-05-01 RX ORDER — SODIUM CHLORIDE 0.9 % (FLUSH) 0.9 %
5-40 SYRINGE (ML) INJECTION EVERY 12 HOURS SCHEDULED
Status: DISCONTINUED | OUTPATIENT
Start: 2024-05-01 | End: 2024-05-01 | Stop reason: HOSPADM

## 2024-05-01 RX ORDER — KETOROLAC TROMETHAMINE 15 MG/ML
15 INJECTION, SOLUTION INTRAMUSCULAR; INTRAVENOUS EVERY 6 HOURS PRN
Status: DISCONTINUED | OUTPATIENT
Start: 2024-05-01 | End: 2024-05-03 | Stop reason: HOSPADM

## 2024-05-01 RX ORDER — OXYCODONE HYDROCHLORIDE 5 MG/1
10 TABLET ORAL EVERY 4 HOURS PRN
Status: DISCONTINUED | OUTPATIENT
Start: 2024-05-01 | End: 2024-05-03 | Stop reason: HOSPADM

## 2024-05-01 RX ORDER — SODIUM CHLORIDE 9 MG/ML
INJECTION, SOLUTION INTRAVENOUS CONTINUOUS
Status: DISCONTINUED | OUTPATIENT
Start: 2024-05-01 | End: 2024-05-01 | Stop reason: HOSPADM

## 2024-05-01 RX ORDER — TRAMADOL HYDROCHLORIDE 50 MG/1
50 TABLET ORAL EVERY 6 HOURS
Status: DISCONTINUED | OUTPATIENT
Start: 2024-05-01 | End: 2024-05-03 | Stop reason: HOSPADM

## 2024-05-01 RX ORDER — SODIUM CHLORIDE 0.9 % (FLUSH) 0.9 %
5-40 SYRINGE (ML) INJECTION EVERY 12 HOURS SCHEDULED
Status: DISCONTINUED | OUTPATIENT
Start: 2024-05-01 | End: 2024-05-03 | Stop reason: HOSPADM

## 2024-05-01 RX ORDER — NALOXONE HYDROCHLORIDE 0.4 MG/ML
INJECTION, SOLUTION INTRAMUSCULAR; INTRAVENOUS; SUBCUTANEOUS PRN
Status: DISCONTINUED | OUTPATIENT
Start: 2024-05-01 | End: 2024-05-01 | Stop reason: HOSPADM

## 2024-05-01 RX ORDER — SODIUM CHLORIDE 9 MG/ML
INJECTION, SOLUTION INTRAVENOUS CONTINUOUS
Status: ACTIVE | OUTPATIENT
Start: 2024-05-01 | End: 2024-05-02

## 2024-05-01 RX ORDER — OXYCODONE HYDROCHLORIDE 5 MG/1
5 TABLET ORAL EVERY 4 HOURS PRN
Status: DISCONTINUED | OUTPATIENT
Start: 2024-05-01 | End: 2024-05-03 | Stop reason: HOSPADM

## 2024-05-01 RX ORDER — ONDANSETRON 2 MG/ML
4 INJECTION INTRAMUSCULAR; INTRAVENOUS EVERY 6 HOURS PRN
Status: DISCONTINUED | OUTPATIENT
Start: 2024-05-01 | End: 2024-05-03 | Stop reason: HOSPADM

## 2024-05-01 RX ORDER — DEXAMETHASONE SODIUM PHOSPHATE 10 MG/ML
8 INJECTION, SOLUTION INTRAMUSCULAR; INTRAVENOUS ONCE
Status: DISCONTINUED | OUTPATIENT
Start: 2024-05-01 | End: 2024-05-01 | Stop reason: HOSPADM

## 2024-05-01 RX ORDER — PALIPERIDONE 3 MG/1
3 TABLET, EXTENDED RELEASE ORAL EVERY MORNING
Status: DISCONTINUED | OUTPATIENT
Start: 2024-05-02 | End: 2024-05-03 | Stop reason: HOSPADM

## 2024-05-01 RX ORDER — SODIUM CHLORIDE 9 MG/ML
INJECTION, SOLUTION INTRAVENOUS PRN
Status: DISCONTINUED | OUTPATIENT
Start: 2024-05-01 | End: 2024-05-03 | Stop reason: HOSPADM

## 2024-05-01 RX ORDER — SODIUM CHLORIDE 9 MG/ML
INJECTION, SOLUTION INTRAVENOUS CONTINUOUS PRN
Status: DISCONTINUED | OUTPATIENT
Start: 2024-05-01 | End: 2024-05-01 | Stop reason: SDUPTHER

## 2024-05-01 RX ORDER — ACETAMINOPHEN 325 MG/1
650 TABLET ORAL EVERY 6 HOURS
Status: DISCONTINUED | OUTPATIENT
Start: 2024-05-01 | End: 2024-05-03 | Stop reason: HOSPADM

## 2024-05-01 RX ORDER — ACETAMINOPHEN 500 MG
1000 TABLET ORAL ONCE
Status: COMPLETED | OUTPATIENT
Start: 2024-05-01 | End: 2024-05-01

## 2024-05-01 RX ORDER — DEXAMETHASONE SODIUM PHOSPHATE 10 MG/ML
10 INJECTION INTRAMUSCULAR; INTRAVENOUS ONCE
Status: COMPLETED | OUTPATIENT
Start: 2024-05-02 | End: 2024-05-02

## 2024-05-01 RX ORDER — ONDANSETRON 4 MG/1
4 TABLET, ORALLY DISINTEGRATING ORAL EVERY 8 HOURS PRN
Status: DISCONTINUED | OUTPATIENT
Start: 2024-05-01 | End: 2024-05-03 | Stop reason: HOSPADM

## 2024-05-01 RX ORDER — MIDAZOLAM HYDROCHLORIDE 1 MG/ML
INJECTION INTRAMUSCULAR; INTRAVENOUS PRN
Status: DISCONTINUED | OUTPATIENT
Start: 2024-05-01 | End: 2024-05-01 | Stop reason: SDUPTHER

## 2024-05-01 RX ORDER — FENTANYL CITRATE 50 UG/ML
INJECTION, SOLUTION INTRAMUSCULAR; INTRAVENOUS
Status: COMPLETED | OUTPATIENT
Start: 2024-05-01 | End: 2024-05-01

## 2024-05-01 RX ORDER — KETOROLAC TROMETHAMINE 30 MG/ML
15 INJECTION, SOLUTION INTRAMUSCULAR; INTRAVENOUS ONCE
Status: COMPLETED | OUTPATIENT
Start: 2024-05-01 | End: 2024-05-01

## 2024-05-01 RX ORDER — IPRATROPIUM BROMIDE AND ALBUTEROL SULFATE 2.5; .5 MG/3ML; MG/3ML
1 SOLUTION RESPIRATORY (INHALATION)
Status: DISCONTINUED | OUTPATIENT
Start: 2024-05-01 | End: 2024-05-01 | Stop reason: HOSPADM

## 2024-05-01 RX ORDER — DEXAMETHASONE SODIUM PHOSPHATE 4 MG/ML
INJECTION, SOLUTION INTRA-ARTICULAR; INTRALESIONAL; INTRAMUSCULAR; INTRAVENOUS; SOFT TISSUE PRN
Status: DISCONTINUED | OUTPATIENT
Start: 2024-05-01 | End: 2024-05-01 | Stop reason: SDUPTHER

## 2024-05-01 RX ORDER — SODIUM CHLORIDE 9 MG/ML
INJECTION, SOLUTION INTRAVENOUS PRN
Status: DISCONTINUED | OUTPATIENT
Start: 2024-05-01 | End: 2024-05-01 | Stop reason: HOSPADM

## 2024-05-01 RX ADMIN — TRANEXAMIC ACID 1000 MG: 10 INJECTION, SOLUTION INTRAVENOUS at 10:12

## 2024-05-01 RX ADMIN — SODIUM CHLORIDE: 9 INJECTION, SOLUTION INTRAVENOUS at 14:17

## 2024-05-01 RX ADMIN — PHENYLEPHRINE HYDROCHLORIDE 100 MCG: 10 INJECTION INTRAVENOUS at 11:11

## 2024-05-01 RX ADMIN — PHENYLEPHRINE HYDROCHLORIDE 100 MCG: 10 INJECTION INTRAVENOUS at 11:18

## 2024-05-01 RX ADMIN — WATER 2000 MG: 1 INJECTION INTRAMUSCULAR; INTRAVENOUS; SUBCUTANEOUS at 17:40

## 2024-05-01 RX ADMIN — ALPRAZOLAM 0.5 MG: 0.25 TABLET ORAL at 14:17

## 2024-05-01 RX ADMIN — ACETAMINOPHEN 1000 MG: 500 TABLET ORAL at 08:23

## 2024-05-01 RX ADMIN — PHENYLEPHRINE HYDROCHLORIDE 100 MCG: 10 INJECTION INTRAVENOUS at 11:03

## 2024-05-01 RX ADMIN — KETOROLAC TROMETHAMINE 15 MG: 30 INJECTION, SOLUTION INTRAMUSCULAR at 08:44

## 2024-05-01 RX ADMIN — SODIUM CHLORIDE, PRESERVATIVE FREE 10 ML: 5 INJECTION INTRAVENOUS at 22:01

## 2024-05-01 RX ADMIN — ACETAMINOPHEN 650 MG: 325 TABLET ORAL at 21:56

## 2024-05-01 RX ADMIN — Medication 5 MG: at 10:44

## 2024-05-01 RX ADMIN — MIDAZOLAM 1 MG: 1 INJECTION INTRAMUSCULAR; INTRAVENOUS at 09:51

## 2024-05-01 RX ADMIN — ACETAMINOPHEN 650 MG: 325 TABLET ORAL at 14:17

## 2024-05-01 RX ADMIN — Medication 5 MG: at 10:42

## 2024-05-01 RX ADMIN — SODIUM CHLORIDE: 9 INJECTION, SOLUTION INTRAVENOUS at 09:48

## 2024-05-01 RX ADMIN — PHENYLEPHRINE HYDROCHLORIDE 100 MCG: 10 INJECTION INTRAVENOUS at 10:49

## 2024-05-01 RX ADMIN — PROPOFOL 100 MCG/KG/MIN: 10 INJECTION, EMULSION INTRAVENOUS at 10:10

## 2024-05-01 RX ADMIN — GLYCOPYRROLATE 0.2 MG: 0.2 INJECTION INTRAMUSCULAR; INTRAVENOUS at 10:49

## 2024-05-01 RX ADMIN — PHENYLEPHRINE HYDROCHLORIDE 100 MCG: 10 INJECTION INTRAVENOUS at 10:55

## 2024-05-01 RX ADMIN — Medication 5 MG: at 10:40

## 2024-05-01 RX ADMIN — DEXAMETHASONE SODIUM PHOSPHATE 10 MG: 4 INJECTION, SOLUTION INTRAMUSCULAR; INTRAVENOUS at 10:35

## 2024-05-01 RX ADMIN — MEPERIDINE HYDROCHLORIDE 12.5 MG: 25 INJECTION INTRAMUSCULAR; INTRAVENOUS; SUBCUTANEOUS at 12:23

## 2024-05-01 RX ADMIN — ASPIRIN 325 MG: 325 TABLET, COATED ORAL at 14:17

## 2024-05-01 RX ADMIN — WATER 2000 MG: 1 INJECTION INTRAMUSCULAR; INTRAVENOUS; SUBCUTANEOUS at 10:14

## 2024-05-01 RX ADMIN — BUPIVACAINE HYDROCHLORIDE IN DEXTROSE 10.5 MG: 7.5 INJECTION, SOLUTION SUBARACHNOID at 10:08

## 2024-05-01 RX ADMIN — TRANEXAMIC ACID 1000 MG: 10 INJECTION, SOLUTION INTRAVENOUS at 12:59

## 2024-05-01 RX ADMIN — TRAMADOL HYDROCHLORIDE 50 MG: 50 TABLET, COATED ORAL at 21:56

## 2024-05-01 RX ADMIN — TRAMADOL HYDROCHLORIDE 50 MG: 50 TABLET, COATED ORAL at 14:17

## 2024-05-01 RX ADMIN — ONDANSETRON 4 MG: 2 INJECTION INTRAMUSCULAR; INTRAVENOUS at 10:35

## 2024-05-01 RX ADMIN — FENTANYL CITRATE 25 MCG: 50 INJECTION, SOLUTION INTRAMUSCULAR; INTRAVENOUS at 10:08

## 2024-05-01 RX ADMIN — Medication 10 MG: at 10:47

## 2024-05-01 RX ADMIN — SODIUM CHLORIDE: 9 INJECTION, SOLUTION INTRAVENOUS at 10:40

## 2024-05-01 ASSESSMENT — PAIN DESCRIPTION - ORIENTATION
ORIENTATION: LEFT

## 2024-05-01 ASSESSMENT — PAIN SCALES - GENERAL
PAINLEVEL_OUTOF10: 5
PAINLEVEL_OUTOF10: 10
PAINLEVEL_OUTOF10: 10

## 2024-05-01 ASSESSMENT — PAIN DESCRIPTION - LOCATION
LOCATION: HIP

## 2024-05-01 ASSESSMENT — PAIN - FUNCTIONAL ASSESSMENT: PAIN_FUNCTIONAL_ASSESSMENT: ACTIVITIES ARE NOT PREVENTED

## 2024-05-01 ASSESSMENT — PAIN DESCRIPTION - DESCRIPTORS: DESCRIPTORS: ACHING;SORE;THROBBING

## 2024-05-01 ASSESSMENT — PAIN DESCRIPTION - PAIN TYPE: TYPE: CHRONIC PAIN

## 2024-05-01 ASSESSMENT — LIFESTYLE VARIABLES: SMOKING_STATUS: 0

## 2024-05-01 NOTE — ANESTHESIA POSTPROCEDURE EVALUATION
Department of Anesthesiology  Postprocedure Note    Patient: Annmarie Zhu  MRN: [id number]  YOB: 1950  Date of evaluation: 5/1/2024    Procedure Summary       Date: 05/01/24 Room / Location: [address]    Anesthesia Start: 0948 Anesthesia Stop: 1145    Procedure: ROBOTIC CHERELLE ASSISTED LEFT HIP TOTAL ARTHROPLASTY (Left: Hip) Diagnosis:       Primary osteoarthritis of left hip      (Primary osteoarthritis of left hip [M16.12])    Surgeons: Kavon Alexandra MD Responsible Provider: Edwin Kinney MD    Anesthesia Type: spinal, general, MAC ASA Status: 3            Anesthesia Type: No value filed.    Livier Phase I: Livier Score: 10    Livier Phase II:      Anesthesia Post Evaluation    Patient location during evaluation: PACU  Patient participation: complete - patient participated  Level of consciousness: awake  Airway patency: patent  Nausea & Vomiting: no nausea and no vomiting  Cardiovascular status: hemodynamically stable  Respiratory status: acceptable  Hydration status: stable  Pain management: adequate    No notable events documented.

## 2024-05-01 NOTE — ACP (ADVANCE CARE PLANNING)
Advance Care Planning   Healthcare Decision Maker:    Primary Decision Maker: ZhuJacy - Child - [phone]    Secondary Decision Maker: Austen Bauman - Brother/Sister - [phone]    Click here to complete Healthcare Decision Makers including selection of the Healthcare Decision Maker Relationship (ie \"Primary\").

## 2024-05-01 NOTE — INTERVAL H&P NOTE
Update History & Physical    H&P reviewed. No changes.    Electronically signed by Kavon Alexandra MD on 5/1/2024 at 8:40 AM

## 2024-05-01 NOTE — DISCHARGE INSTRUCTIONS
Orthopaedic Patient Instructions:     Medications for pain:    Acetaminophen - Take one tablet every 6 hours for 1 week.  Then take every 6 hours as needed for pain.    Tramadol - Take one tablet every 6 hours for 1 week.  Then take every 6 hours as needed for pain.    Oxycodone 1 tablet every 4 hours as needed for pain.  You can take 2 every 6 hours for severe pain.    Medication for DVT prophylaxis (Prevention of blood clots)  Aspirin - Take 1 tablet daily for 6 weeks for prevention of blood clots    Medication for constipation:  Colace - Take 1 tablet every 12 hours as needed for constipation        Activity: weight bear as tolerated, posterior hip precautions  Diet: regular diet  Wound Care: Patient should remove dressing in 9 days.  Patient can shower with the dressing on but should not bathe.  After removing, the dressing keep incision clean and dry    Follow-up with Dr. Alexandra in 2 weeks.  Call for an appointment

## 2024-05-01 NOTE — ANESTHESIA PROCEDURE NOTES
Spinal Block    Patient location during procedure: OR  End time: 5/1/2024 10:08 AM  Reason for block: primary anesthetic  Staffing  Performed: other anesthesia staff   Anesthesiologist: Edwin Kinney MD  Other anesthesia staff: Karl Benítez RN  Performed by: Karl Benítez RN  Authorized by: Edwin Kinney MD    Spinal Block  Patient position: sitting  Prep: ChloraPrep and site prepped and draped  Patient monitoring: cardiac monitor, continuous pulse ox and frequent blood pressure checks  Approach: midline  Location: L3/L4  Provider prep: mask and sterile gloves  Local infiltration: lidocaine  Needle  Needle type: Quincke   Needle gauge: 22 G  Needle length: 3.5 in  Assessment  Sensory level: T4  Swirl obtained: Yes  CSF: clear  Attempts: 1  Hemodynamics: stable  Additional Notes  Tolerated procedure well, VSS  Preanesthetic Checklist  Completed: patient identified, IV checked, site marked, risks and benefits discussed, surgical/procedural consents, equipment checked, pre-op evaluation, timeout performed, anesthesia consent given, oxygen available and monitors applied/VS acknowledged

## 2024-05-01 NOTE — CARE COORDINATION
Met with patient about diagnosis and discharge plan of care. Post op left total hip arthoplasty. Pt seen in ortho class. Lives alone in 2nd floor apt, 2 steps porch, 7 steps/rails apt. Pt is going to stay with daughter, Jacy in ranch home. [address], [place] [id number]. Orders completed. Pt has wheeled walker, standard commode and tub bench. PCP is dr Orlando. Will follow-o

## 2024-05-01 NOTE — OP NOTE
Operative Report  Annmarie Zhu  [id number]  5/1/2024    Pre-operative diagnosis: Left hip degenerative joint disease    Post-operative diagnosis: Left hip degenerative joint disease    Procedure: Chris left total hip arthroplasty    Surgeon: Kavon Alexandra MD    Assistant:  Corinne Schenker, NP; assisted with positioning, retracting and closing      Anesthesia:  General    Operative Indication: Annmarie is a 73 y/o female with severe left hip degenerative joint disease and worsening left hip pain for the past year. The pain has become a quality of life issue and the patient has failed nonoperative treatment. She presents for a total hip arthroplasty. The risks and benefits of surgery were discussed and all questions were answered.  The risks for surgery include bleeding, infection, damage to blood vessels, damage to nerves, risk of further surgery, chronic pain,  restricted range of motion, risk of continued discomfort, risk of need for further reconstructive procedures, leg length discrepancy, risk of need for altered activities and altered gait, risk of blood clots, pulmonary embolism, myocardial infarction, and risk of death were discussed. The patient understood these risks and consented for surgery.    EBL: 150 cc    Complications: None    Components: 1. [place] Accolade II Stem, 127°, size 5    2. Daniel Trident cup, size 50    3. Daniel ceramic head, size 36, -5    Operative Procedure:     Following spinal anesthesia, the patient was positioned lateral decubitus with the body supported by a peg board. A Beibamboo robotic marker was placed on the patella superficially. The affected leg and hip were prepped and draped in the usual standard fashion. The down leg was padded and protected. The hip and groin were sealed with Ioban. An intra-operative time out was called to confirm the planned surgical procedure and administration of IV Ancef. 3 small skin incisions were made over the iliac crest posterior to the

## 2024-05-01 NOTE — DISCHARGE SUMMARY
Annmarie Zhu  [id number]  74 y.o.  1950    Admit date: 5/1/2024    Discharge date and time: 5/3/2024    Admitting Physician: SHADI Alexandra MD    Discharge Physician: SHADI Alexandra MD    Admission Diagnoses: left Hip Osteoarthritis    Discharge Diagnoses: Same    Admission Condition: Good    Discharged Condition: Good    Procedure and Date: left Total Hip Arthroplasty     Hospital Course: A total hip arthroplasty was performed on 5/1/2024.  Following this procedure the patient was admitted for a 1 day postoperative hospital stay.  During this admission, DVT prophylaxis was followed using bilateral ICDs and ASA. Post-operative pain control was achieved with the use of IV/oral pain medications. She did receive 2 units of PRBC for hgb of 7.7. She does have chronic anemia with expected blood loss after surgery. Discharge plans were discussed with the patient with the aid of .     Disposition: Home    Patient Instructions:     Medications for pain:    Acetaminophen - Take one tablet every 6 hours for 1 week.  Then take every 6 hours as needed for pain.    Tramadol - Take one tablet every 6 hours for 1 week.  Then take every 6 hours as needed for pain.    Oxycodone 1 tablet every 4 hours as needed for pain.  You can take 2 every 6 hours for severe pain.    Medication for DVT prophylaxis (Prevention of blood clots)  Aspirin - Take 1 tablet daily for 6 weeks for prevention of blood clots    Medication for constipation:  Colace - Take 1 tablet every 12 hours as needed for constipation        Activity: Weight bear as tolerated with a walker. Posterior hip precautions.  Diet: regular diet  Wound Care: Patient should remove dressing in 9 days.  Patient can shower with the dressing on but should not bathe.  After removing, the dressing keep incision clean and dry    Follow-up with Dr. Alexandra in 2 weeks.

## 2024-05-01 NOTE — ANESTHESIA PRE PROCEDURE
Department of Anesthesiology  Preprocedure Note       Name:  Annmarie Zhu   Age:  74 y.o.  :  1950                                          MRN:  [id number]         Date:  2024      Surgeon: Surgeon(s):  Kavon Alexandra MD    Procedure: Procedure(s):  ROBOTIC CHERELLE ASSISTED LEFT HIP TOTAL ARTHROPLASTY  +++JEFF+++    Medications prior to admission:   Prior to Admission medications    Medication Sig Start Date End Date Taking? Authorizing Provider   aspirin 81 MG EC tablet Take 1 tablet by mouth daily    Philippe Soliman MD   ALPRAZolam (XANAX) 0.5 MG tablet Take 1 tablet by mouth 3 times daily. 24   Philippe Soliman MD   traMADol (ULTRAM) 50 MG tablet Take 1 tablet by mouth every 6 hours as needed. 4/15/24   Philippe Soliman MD   Omega-3 Fatty Acids (FISH OIL) 1000 MG capsule Take 1 capsule by mouth daily    Philippe Soliman MD   vitamin B-12 (CYANOCOBALAMIN) 100 MCG tablet Take 0.5 tablets by mouth daily    Philippe Soliman MD   Vitamin D3 125 MCG (5000 UT) TABS tablet Take 1 tablet by mouth daily    Philippe Soliman MD   Multiple Vitamins-Minerals (CENTRUM MULTIGUMMIES) CHEW Take 2 each by mouth daily    Philippe Soliman MD   VORTIoxetine HBr (TRINTELLIX) 20 MG TABS tablet Take 1 tablet by mouth daily    Philippe Soliman MD   paliperidone (INVEGA) 3 MG ER tablet Take 1 tablet by mouth every morning    Phliippe Soliman MD       Current medications:    Current Facility-Administered Medications   Medication Dose Route Frequency Provider Last Rate Last Admin    acetaminophen (TYLENOL) tablet 1,000 mg  1,000 mg Oral Once Schenker, Corinne L, APRN - CNP        dexAMETHasone (PF) (DECADRON) injection 8 mg  8 mg IntraVENous Once Schenker, Corinne L, APRN - CNP        0.9 % sodium chloride infusion   IntraVENous Continuous Schenker, Corinne L, APRN - CNP        sodium chloride flush 0.9 % injection 5-40 mL  5-40 mL IntraVENous 2 times per day Schenker, Corinne L,

## 2024-05-02 LAB
ANION GAP SERPL CALCULATED.3IONS-SCNC: 9 MMOL/L (ref 7–16)
BUN SERPL-MCNC: 28 MG/DL (ref 6–23)
CALCIUM SERPL-MCNC: 8.7 MG/DL (ref 8.6–10.2)
CHLORIDE SERPL-SCNC: 107 MMOL/L (ref 98–107)
CO2 SERPL-SCNC: 23 MMOL/L (ref 22–29)
CREAT SERPL-MCNC: 0.7 MG/DL (ref 0.5–1)
ERYTHROCYTE [DISTWIDTH] IN BLOOD BY AUTOMATED COUNT: 14.5 % (ref 11.5–15)
GFR, ESTIMATED: 85 ML/MIN/1.73M2
GLUCOSE SERPL-MCNC: 133 MG/DL (ref 74–99)
HCT VFR BLD AUTO: 24.2 % (ref 34–48)
HGB BLD-MCNC: 7.7 G/DL (ref 11.5–15.5)
MCH RBC QN AUTO: 33.8 PG (ref 26–35)
MCHC RBC AUTO-ENTMCNC: 31.8 G/DL (ref 32–34.5)
MCV RBC AUTO: 106.1 FL (ref 80–99.9)
PLATELET # BLD AUTO: 124 K/UL (ref 130–450)
PMV BLD AUTO: 9.2 FL (ref 7–12)
POTASSIUM SERPL-SCNC: 4.6 MMOL/L (ref 3.5–5)
RBC # BLD AUTO: 2.28 M/UL (ref 3.5–5.5)
SODIUM SERPL-SCNC: 139 MMOL/L (ref 132–146)
WBC OTHER # BLD: 4.6 K/UL (ref 4.5–11.5)

## 2024-05-02 PROCEDURE — 36415 COLL VENOUS BLD VENIPUNCTURE: CPT

## 2024-05-02 PROCEDURE — G0378 HOSPITAL OBSERVATION PER HR: HCPCS

## 2024-05-02 PROCEDURE — 6360000002 HC RX W HCPCS: Performed by: ORTHOPAEDIC SURGERY

## 2024-05-02 PROCEDURE — 6370000000 HC RX 637 (ALT 250 FOR IP): Performed by: ORTHOPAEDIC SURGERY

## 2024-05-02 PROCEDURE — 86900 BLOOD TYPING SEROLOGIC ABO: CPT

## 2024-05-02 PROCEDURE — 96374 THER/PROPH/DIAG INJ IV PUSH: CPT

## 2024-05-02 PROCEDURE — 85027 COMPLETE CBC AUTOMATED: CPT

## 2024-05-02 PROCEDURE — 97110 THERAPEUTIC EXERCISES: CPT

## 2024-05-02 PROCEDURE — 36430 TRANSFUSION BLD/BLD COMPNT: CPT

## 2024-05-02 PROCEDURE — P9016 RBC LEUKOCYTES REDUCED: HCPCS

## 2024-05-02 PROCEDURE — 2580000003 HC RX 258: Performed by: ORTHOPAEDIC SURGERY

## 2024-05-02 PROCEDURE — 86923 COMPATIBILITY TEST ELECTRIC: CPT

## 2024-05-02 PROCEDURE — 86850 RBC ANTIBODY SCREEN: CPT

## 2024-05-02 PROCEDURE — 97116 GAIT TRAINING THERAPY: CPT

## 2024-05-02 PROCEDURE — 86901 BLOOD TYPING SEROLOGIC RH(D): CPT

## 2024-05-02 PROCEDURE — 96375 TX/PRO/DX INJ NEW DRUG ADDON: CPT

## 2024-05-02 PROCEDURE — 96376 TX/PRO/DX INJ SAME DRUG ADON: CPT

## 2024-05-02 PROCEDURE — 80048 BASIC METABOLIC PNL TOTAL CA: CPT

## 2024-05-02 PROCEDURE — 97535 SELF CARE MNGMENT TRAINING: CPT

## 2024-05-02 PROCEDURE — 97530 THERAPEUTIC ACTIVITIES: CPT

## 2024-05-02 PROCEDURE — 97161 PT EVAL LOW COMPLEX 20 MIN: CPT

## 2024-05-02 RX ORDER — SODIUM CHLORIDE 9 MG/ML
INJECTION, SOLUTION INTRAVENOUS PRN
Status: DISCONTINUED | OUTPATIENT
Start: 2024-05-02 | End: 2024-05-03 | Stop reason: HOSPADM

## 2024-05-02 RX ADMIN — ACETAMINOPHEN 650 MG: 325 TABLET ORAL at 02:41

## 2024-05-02 RX ADMIN — PALIPERIDONE 3 MG: 3 TABLET, EXTENDED RELEASE ORAL at 10:22

## 2024-05-02 RX ADMIN — WATER 2000 MG: 1 INJECTION INTRAMUSCULAR; INTRAVENOUS; SUBCUTANEOUS at 02:41

## 2024-05-02 RX ADMIN — KETOROLAC TROMETHAMINE 15 MG: 15 INJECTION, SOLUTION INTRAMUSCULAR; INTRAVENOUS at 10:23

## 2024-05-02 RX ADMIN — DOCUSATE SODIUM 50 MG AND SENNOSIDES 8.6 MG 1 TABLET: 8.6; 5 TABLET, FILM COATED ORAL at 10:23

## 2024-05-02 RX ADMIN — ALPRAZOLAM 0.5 MG: 0.25 TABLET ORAL at 00:52

## 2024-05-02 RX ADMIN — ALPRAZOLAM 0.5 MG: 0.25 TABLET ORAL at 10:22

## 2024-05-02 RX ADMIN — KETOROLAC TROMETHAMINE 15 MG: 15 INJECTION, SOLUTION INTRAMUSCULAR; INTRAVENOUS at 02:41

## 2024-05-02 RX ADMIN — TRAMADOL HYDROCHLORIDE 50 MG: 50 TABLET, COATED ORAL at 10:22

## 2024-05-02 RX ADMIN — TRAMADOL HYDROCHLORIDE 50 MG: 50 TABLET, COATED ORAL at 18:43

## 2024-05-02 RX ADMIN — DOCUSATE SODIUM 50 MG AND SENNOSIDES 8.6 MG 1 TABLET: 8.6; 5 TABLET, FILM COATED ORAL at 00:52

## 2024-05-02 RX ADMIN — SODIUM CHLORIDE, PRESERVATIVE FREE 10 ML: 5 INJECTION INTRAVENOUS at 10:24

## 2024-05-02 RX ADMIN — ACETAMINOPHEN 650 MG: 325 TABLET ORAL at 18:43

## 2024-05-02 RX ADMIN — OXYCODONE 5 MG: 5 TABLET ORAL at 06:51

## 2024-05-02 RX ADMIN — VORTIOXETINE 20 MG: 10 TABLET, FILM COATED ORAL at 10:22

## 2024-05-02 RX ADMIN — TRAMADOL HYDROCHLORIDE 50 MG: 50 TABLET, COATED ORAL at 02:41

## 2024-05-02 RX ADMIN — DEXAMETHASONE SODIUM PHOSPHATE 10 MG: 10 INJECTION INTRAMUSCULAR; INTRAVENOUS at 10:23

## 2024-05-02 RX ADMIN — ASPIRIN 325 MG: 325 TABLET, COATED ORAL at 10:22

## 2024-05-02 RX ADMIN — ACETAMINOPHEN 650 MG: 325 TABLET ORAL at 10:21

## 2024-05-02 RX ADMIN — ALPRAZOLAM 0.5 MG: 0.25 TABLET ORAL at 15:10

## 2024-05-02 ASSESSMENT — PAIN DESCRIPTION - DESCRIPTORS
DESCRIPTORS: SHARP;SHOOTING
DESCRIPTORS: ACHING;SORE;THROBBING
DESCRIPTORS: ACHING;SORE
DESCRIPTORS: ACHING;SORE;THROBBING

## 2024-05-02 ASSESSMENT — PAIN - FUNCTIONAL ASSESSMENT
PAIN_FUNCTIONAL_ASSESSMENT: ACTIVITIES ARE NOT PREVENTED
PAIN_FUNCTIONAL_ASSESSMENT: PREVENTS OR INTERFERES SOME ACTIVE ACTIVITIES AND ADLS
PAIN_FUNCTIONAL_ASSESSMENT: PREVENTS OR INTERFERES SOME ACTIVE ACTIVITIES AND ADLS
PAIN_FUNCTIONAL_ASSESSMENT: ACTIVITIES ARE NOT PREVENTED

## 2024-05-02 ASSESSMENT — PAIN DESCRIPTION - ORIENTATION
ORIENTATION: LEFT

## 2024-05-02 ASSESSMENT — PAIN SCALES - GENERAL
PAINLEVEL_OUTOF10: 6
PAINLEVEL_OUTOF10: 6
PAINLEVEL_OUTOF10: 8
PAINLEVEL_OUTOF10: 8
PAINLEVEL_OUTOF10: 6
PAINLEVEL_OUTOF10: 6

## 2024-05-02 ASSESSMENT — PAIN DESCRIPTION - LOCATION
LOCATION: HIP

## 2024-05-02 ASSESSMENT — PAIN DESCRIPTION - PAIN TYPE: TYPE: ACUTE PAIN

## 2024-05-02 NOTE — PLAN OF CARE
Problem: Discharge Planning  Goal: Discharge to home or other facility with appropriate resources  5/2/2024 1515 by Cece Lassiter RN  Outcome: Progressing  5/2/2024 0503 by Swathi Turner RN  Outcome: Progressing     Problem: Pain  Goal: Verbalizes/displays adequate comfort level or baseline comfort level  5/2/2024 1515 by Cece Lassiter RN  Outcome: Progressing  5/2/2024 0503 by Swathi Turner RN  Outcome: Progressing     Problem: Safety - Adult  Goal: Free from fall injury  5/2/2024 1515 by Cece Lassiter RN  Outcome: Progressing  5/2/2024 0503 by Swathi Turner RN  Outcome: Progressing

## 2024-05-02 NOTE — PLAN OF CARE
Problem: Discharge Planning  Goal: Discharge to home or other facility with appropriate resources  5/2/2024 1954 by Karen Jordan RN  Outcome: Progressing     Problem: Pain  Goal: Verbalizes/displays adequate comfort level or baseline comfort level  5/2/2024 1954 by Karen Jordan RN  Outcome: Progressing     Problem: Safety - Adult  Goal: Free from fall injury  5/2/2024 1954 by Karen Jordan RN  Outcome: Progressing

## 2024-05-02 NOTE — CONSENT
Informed Consent for Blood Component Transfusion Note    I have discussed with the patient the rationale for blood component transfusion; its benefits in treating or preventing fatigue, organ damage, or death; and its risk which includes mild transfusion reactions, rare risk of blood borne infection, or more serious but rare reactions. I have discussed the alternatives to transfusion, including the risk and consequences of not receiving transfusion. The patient had an opportunity to ask questions and had agreed to proceed with transfusion of blood components.    Electronically signed by Kavon Alexandra MD on 5/2/24 at 7:39 AM EDT

## 2024-05-02 NOTE — PLAN OF CARE
Problem: Discharge Planning  Goal: Discharge to home or other facility with appropriate resources  5/2/2024 1758 by Cece Lassiter RN  Outcome: Progressing  5/2/2024 1515 by Cece Lassiter RN  Outcome: Progressing  5/2/2024 0503 by Swathi Turner RN  Outcome: Progressing     Problem: Pain  Goal: Verbalizes/displays adequate comfort level or baseline comfort level  5/2/2024 1758 by Cece Lassiter RN  Outcome: Progressing  5/2/2024 1515 by Cece Lassiter RN  Outcome: Progressing  5/2/2024 0503 by Swathi Turner RN  Outcome: Progressing     Problem: Safety - Adult  Goal: Free from fall injury  5/2/2024 1758 by Cece Lassiter RN  Outcome: Progressing  5/2/2024 1515 by Cece Lassiter RN  Outcome: Progressing  5/2/2024 0503 by Swathi Turner RN  Outcome: Progressing

## 2024-05-03 VITALS
SYSTOLIC BLOOD PRESSURE: 141 MMHG | OXYGEN SATURATION: 99 % | HEART RATE: 69 BPM | RESPIRATION RATE: 16 BRPM | DIASTOLIC BLOOD PRESSURE: 67 MMHG | BODY MASS INDEX: 32.98 KG/M2 | HEIGHT: 60 IN | TEMPERATURE: 97.9 F | WEIGHT: 168 LBS

## 2024-05-03 LAB
ABO/RH: NORMAL
ANTIBODY SCREEN: NEGATIVE
ARM BAND NUMBER: NORMAL
BLOOD BANK BLOOD PRODUCT EXPIRATION DATE: NORMAL
BLOOD BANK BLOOD PRODUCT EXPIRATION DATE: NORMAL
BLOOD BANK DISPENSE STATUS: NORMAL
BLOOD BANK DISPENSE STATUS: NORMAL
BLOOD BANK ISBT PRODUCT BLOOD TYPE: 5100
BLOOD BANK ISBT PRODUCT BLOOD TYPE: 5100
BLOOD BANK PRODUCT CODE: NORMAL
BLOOD BANK PRODUCT CODE: NORMAL
BLOOD BANK SAMPLE EXPIRATION: NORMAL
BLOOD BANK UNIT TYPE AND RH: NORMAL
BLOOD BANK UNIT TYPE AND RH: NORMAL
BPU ID: NORMAL
BPU ID: NORMAL
COMPONENT: NORMAL
COMPONENT: NORMAL
CROSSMATCH RESULT: NORMAL
CROSSMATCH RESULT: NORMAL
HCT VFR BLD AUTO: 30.5 % (ref 34–48)
HCT VFR BLD AUTO: 32.6 % (ref 34–48)
HGB BLD-MCNC: 10.2 G/DL (ref 11.5–15.5)
HGB BLD-MCNC: 9.7 G/DL (ref 11.5–15.5)
TRANSFUSION STATUS: NORMAL
TRANSFUSION STATUS: NORMAL
UNIT DIVISION: 0
UNIT DIVISION: 0
UNIT ISSUE DATE/TIME: NORMAL
UNIT ISSUE DATE/TIME: NORMAL

## 2024-05-03 PROCEDURE — 2580000003 HC RX 258: Performed by: ORTHOPAEDIC SURGERY

## 2024-05-03 PROCEDURE — G0378 HOSPITAL OBSERVATION PER HR: HCPCS

## 2024-05-03 PROCEDURE — 6370000000 HC RX 637 (ALT 250 FOR IP): Performed by: ORTHOPAEDIC SURGERY

## 2024-05-03 PROCEDURE — 6360000002 HC RX W HCPCS: Performed by: ORTHOPAEDIC SURGERY

## 2024-05-03 PROCEDURE — 85018 HEMOGLOBIN: CPT

## 2024-05-03 PROCEDURE — 96376 TX/PRO/DX INJ SAME DRUG ADON: CPT

## 2024-05-03 PROCEDURE — 85014 HEMATOCRIT: CPT

## 2024-05-03 PROCEDURE — 97530 THERAPEUTIC ACTIVITIES: CPT

## 2024-05-03 RX ORDER — TRAMADOL HYDROCHLORIDE 50 MG/1
50 TABLET ORAL EVERY 6 HOURS
Qty: 28 TABLET | Refills: 0 | Status: SHIPPED | OUTPATIENT
Start: 2024-05-03 | End: 2024-05-10

## 2024-05-03 RX ORDER — OXYCODONE HYDROCHLORIDE 5 MG/1
5 TABLET ORAL EVERY 4 HOURS PRN
Qty: 42 TABLET | Refills: 0 | Status: SHIPPED | OUTPATIENT
Start: 2024-05-03 | End: 2024-05-10

## 2024-05-03 RX ORDER — ASPIRIN 325 MG
325 TABLET, DELAYED RELEASE (ENTERIC COATED) ORAL DAILY
Qty: 30 TABLET | Refills: 3 | Status: SHIPPED | OUTPATIENT
Start: 2024-05-03

## 2024-05-03 RX ORDER — ACETAMINOPHEN 325 MG/1
650 TABLET ORAL EVERY 6 HOURS
Qty: 120 TABLET | Refills: 3 | Status: SHIPPED | OUTPATIENT
Start: 2024-05-03

## 2024-05-03 RX ORDER — SENNA AND DOCUSATE SODIUM 50; 8.6 MG/1; MG/1
1 TABLET, FILM COATED ORAL 2 TIMES DAILY
Qty: 60 TABLET | Refills: 1 | Status: SHIPPED | OUTPATIENT
Start: 2024-05-03

## 2024-05-03 RX ADMIN — TRAMADOL HYDROCHLORIDE 50 MG: 50 TABLET, COATED ORAL at 06:31

## 2024-05-03 RX ADMIN — TRAMADOL HYDROCHLORIDE 50 MG: 50 TABLET, COATED ORAL at 01:15

## 2024-05-03 RX ADMIN — ACETAMINOPHEN 650 MG: 325 TABLET ORAL at 06:31

## 2024-05-03 RX ADMIN — ALPRAZOLAM 0.5 MG: 0.25 TABLET ORAL at 10:09

## 2024-05-03 RX ADMIN — KETOROLAC TROMETHAMINE 15 MG: 15 INJECTION, SOLUTION INTRAMUSCULAR; INTRAVENOUS at 10:09

## 2024-05-03 RX ADMIN — ALPRAZOLAM 0.5 MG: 0.25 TABLET ORAL at 01:16

## 2024-05-03 RX ADMIN — ASPIRIN 325 MG: 325 TABLET, COATED ORAL at 10:09

## 2024-05-03 RX ADMIN — DOCUSATE SODIUM 50 MG AND SENNOSIDES 8.6 MG 1 TABLET: 8.6; 5 TABLET, FILM COATED ORAL at 01:16

## 2024-05-03 RX ADMIN — VORTIOXETINE 20 MG: 10 TABLET, FILM COATED ORAL at 10:09

## 2024-05-03 RX ADMIN — ACETAMINOPHEN 650 MG: 325 TABLET ORAL at 01:16

## 2024-05-03 RX ADMIN — DOCUSATE SODIUM 50 MG AND SENNOSIDES 8.6 MG 1 TABLET: 8.6; 5 TABLET, FILM COATED ORAL at 10:09

## 2024-05-03 RX ADMIN — SODIUM CHLORIDE, PRESERVATIVE FREE 10 ML: 5 INJECTION INTRAVENOUS at 10:09

## 2024-05-03 RX ADMIN — PALIPERIDONE 3 MG: 3 TABLET, EXTENDED RELEASE ORAL at 10:09

## 2024-05-03 RX ADMIN — SODIUM CHLORIDE, PRESERVATIVE FREE 10 ML: 5 INJECTION INTRAVENOUS at 01:16

## 2024-05-03 ASSESSMENT — PAIN DESCRIPTION - LOCATION
LOCATION: HIP
LOCATION: HIP

## 2024-05-03 ASSESSMENT — PAIN SCALES - GENERAL
PAINLEVEL_OUTOF10: 2
PAINLEVEL_OUTOF10: 4

## 2024-05-03 ASSESSMENT — PAIN DESCRIPTION - ORIENTATION: ORIENTATION: LEFT

## 2024-05-03 ASSESSMENT — PAIN DESCRIPTION - DESCRIPTORS: DESCRIPTORS: ACHING;DISCOMFORT;SORE

## 2024-05-03 NOTE — PROGRESS NOTES
CLINICAL PHARMACY NOTE: MEDS TO BEDS    Total # of Prescriptions Filled: 3   The following medications were delivered to the patient:  Senna 8.6/50 mg   Acetaminophen 325 mg   Aspirin 325 mg     Additional Documentation:  Tramadol too soon can fill 5/13  Patient did not take the oxycodone 5 mg.   
I met with Annmarie orlando am for an orthopaedic education class. We discussed information regarding pre, intra, post-op, discharge, and LOS expectations. I provided ortho education materials. I encouraged the patient to call with any questions or concerns.  
[facility] Quality Flow/Interdisciplinary Rounds Progress Note        Quality Flow Rounds held on May 2, 2024    Disciplines Attending:  Bedside Nurse, , , and Nursing Unit Leadership    Annmarie Zhu was admitted on 5/1/2024  7:49 AM    Anticipated Discharge Date:  Expected Discharge Date: 05/03/24    Disposition:    Robby Score:  Robby Scale Score: 20    Readmission Risk              Risk of Unplanned Readmission:  0           Discussed patient goal for the day, patient clinical progression, and barriers to discharge.  The following Goal(s) of the Day/Commitment(s) have been identified:   PT/OT, 2U PRBC today, discharge planning      Pieter Villegas, RN  May 2, 2024        
Occupational Therapy  OT BEDSIDE TREATMENT NOTE      Date:2024  Patient Name: Annmarie Zhu  MRN: [id number]  : 1950  Room: [address] [place] [address]       Evaluating OT: Bria Cuevas OTR/L - [id number]     Referring Provider: Kavon Alexandra MD   Specific Provider Orders/Date: \"OT eval and treat\" - 2024     Diagnosis: Primary osteoarthritis of left hip [M16.12]    Surgery: Patient underwent L ECHO on 2024.  Pertinent Medical History: anemia, anxiety, arthritis, cervical CA, depression, IBS, R ECHO     Precautions: fall risk, posterolateral hip precautions, full WBAT LLE     Assessment of Current Deficits:    [x] Functional mobility             [x]ADLs           [x] Strength                  []Cognition   [x] Functional transfers           [x] IADLs         [x] Safety Awareness   [x]Endurance   [] Fine Coordination              [x] Balance      [] Vision/perception   []Sensation     []Gross Motor Coordination  [] ROM           [] Delirium                   [] Motor Control      OT PLAN OF CARE   OT POC is based on physician orders, patient diagnosis, and results of clinical assessment.  Frequency/Duration 2-5 days/week for 2 weeks PRN   Specific OT Treatment Interventions to Include:   * Instruction/training on adapted ADL techniques and AE recommendations to increase functional independence within precautions       * Training on energy conservation strategies, correct breathing pattern and techniques to improve independence/tolerance for self-care routine  * Functional transfer/mobility training/DME recommendations for increased independence, safety, and fall prevention  * Patient/Family education to increase follow through with safety techniques and functional independence  * Recommendation of environmental modifications for increased safety with functional transfers/mobility and ADLs  * Therapeutic exercise to improve motor endurance, ROM, and functional strength for ADLs/functional transfers  * Therapeutic 
Orthopaedic Surgery Progress Note  SHADI Alexandra MD      SUBJECTIVE:  No acute events over night. Pain controlled. Denies chest pain or shortness of breath.    OBJECTIVE:   AAOx3, NAD    Left lower extremity    Dressings C/D/I  SILT L1-S1  TA/EHL/GS intact  Calf soft, NT  +2 DP, W/WP     Data:  CBC with Differential:    Lab Results   Component Value Date/Time    WBC 4.6 05/02/2024 04:27 AM    RBC 2.28 05/02/2024 04:27 AM    HGB 10.2 05/03/2024 09:25 AM    HCT 32.6 05/03/2024 09:25 AM     05/02/2024 04:27 AM    .1 05/02/2024 04:27 AM    MCH 33.8 05/02/2024 04:27 AM    MCHC 31.8 05/02/2024 04:27 AM    RDW 14.5 05/02/2024 04:27 AM    NRBC 0.0 10/19/2021 09:05 AM    LYMPHOPCT 30 01/13/2024 01:33 PM    PROMYELOPCT 0.9 10/19/2021 09:05 AM    MONOPCT 14 01/13/2024 01:33 PM    EOSPCT 5 01/13/2024 01:33 PM    BASOPCT 1 01/13/2024 01:33 PM    MONOSABS 0.64 01/13/2024 01:33 PM    EOSABS 0.23 01/13/2024 01:33 PM    BASOSABS 0.05 01/13/2024 01:33 PM         A/P: POD 2 left ECHO, chronic anemia  - Hgb 10.2  - WBAT  - Pain control  - PT/OT  - DVT prophylaxis  - D/C home today      
Orthopaedic Surgery Progress Note  SHADI Alexandra MD      SUBJECTIVE:  No acute events over night. Pain controlled. Denies chest pain or shortness of breath.    OBJECTIVE:   AAOx3, NAD    Left lower extremity    Dressings C/D/I  SILT L1-S1  TA/EHL/GS intact  Calf soft, NT  +2 DP, W/WP     Data:  CBC:   Lab Results   Component Value Date/Time    WBC 4.6 05/02/2024 04:27 AM    RBC 2.28 05/02/2024 04:27 AM    HGB 7.7 05/02/2024 04:27 AM    HCT 24.2 05/02/2024 04:27 AM    .1 05/02/2024 04:27 AM    MCH 33.8 05/02/2024 04:27 AM    MCHC 31.8 05/02/2024 04:27 AM    RDW 14.5 05/02/2024 04:27 AM     05/02/2024 04:27 AM    MPV 9.2 05/02/2024 04:27 AM     BMP:    Lab Results   Component Value Date/Time     05/02/2024 04:27 AM    K 4.6 05/02/2024 04:27 AM    K 4.0 10/19/2021 09:05 AM     05/02/2024 04:27 AM    CO2 23 05/02/2024 04:27 AM    BUN 28 05/02/2024 04:27 AM    CREATININE 0.7 05/02/2024 04:27 AM    CALCIUM 8.7 05/02/2024 04:27 AM    GFRAA >60 10/23/2021 12:00 PM    LABGLOM >60 10/23/2021 12:00 PM    GLUCOSE 133 05/02/2024 04:27 AM    GLUCOSE 118 11/12/2010 11:44 AM         A/P: POD 1 left ECHO, chronic anemia, hgb 7.7, expected blood loss after surgery  - Transfuse 2 units PRBC  - WBAT  - Pain control  - PT/OT  - DVT prophylaxis  - D/C planning for home tomorrow    SHADI Alexandra MD   
Patient report called to 7th floor RN; family waiting room notified of patient transfer. CRUZ Hernandez.  
Physical Therapy  Facility/Department: [address] MED SURG  Physical Therapy Initial Assessment    Name: Annmarie Zhu  : 1950  MRN: [id number]  Date of Service: 2024    Attending Provider:  Kavon Alexandra MD    Evaluating PT:  Bruce Mccormick Jr., P.T.    Room #:  [address] [place] [address]-A  Diagnosis:  Primary osteoarthritis of left hip [M16.12]  Pertinent PMHx/PSHx:  R ECHO 10/4/21, anemia  Procedure/Surgery:  L ECHO 24  Precautions:  L hip posterolateral precautions, WBAT LLE, Hgb 7.7 24    SUBJECTIVE:    Pt lives alone in a 2nd floor apt with 2 stairs and 1 rail to enter.  There are 7 steps and 2 rails wide apart to her 2nd floor apt.  Pt ambulated with a cane PTA.  She has a ww.     Pt planning on staying with her daughter at time of discharge and she has a 1 story home with 1+1+1 platform steps to enter the home.      OBJECTIVE:   Initial Evaluation  Date: 24 Treatment Short Term/ Long Term   Goals   Was pt agreeable to Eval/treatment? yes     Does pt have pain? C/o L hip pain 6/10     Bed Mobility  Rolling: supervision  Supine to sit: supervision  Sit to supine: MIN A  Scooting: supervision  Independent    Transfers Sit to stand: SBA  Stand to sit: SBA  Stand pivot: SBA with ww  Independent    Ambulation   200 feet x2 reps with ww SBA  250 feet with ww supervision   Stair negotiation: ascended and descended 3 steps with 2 rails and 1 step with ww SBA  3 steps with 2 rails and 1 step with ww supervision   AM-PAC 6 Clicks        BLE ROM is WFL, L hip is WFL allowed by precautions.   RLE strength is grossly 4/5 to 4+/5 and LLE is grossly 3-/5 to 4/5.   Sensation:  Pt denies numbness and tingling to extremities  Balance: sitting is supervision and standing with ww is SBA  Endurance: fair+    Therapeutic Exercises:  Pt was instructed in/performed supine exercises with the LLE: ankle PF/DF with bed sheet 2x15 reps, heel slides and quad sets AAROM with bed sheet 2x15 reps, hip ABD/ADD AAROM 
Physical Therapy  Facility/Department: [address] INTERMEDIATE MED SURG  Treatment Note  Name: Annmarie Zhu  : 1950  MRN: [id number]  Date of Service: 2024    Attending Provider:  Kavon Alexandra MD    Evaluating PT:  Bruce Mccormick Jr., P.T.    Room #:  0747/0747-A  Diagnosis:  Primary osteoarthritis of left hip [M16.12]  Pertinent PMHx/PSHx:  R ECHO 10/4/21, anemia  Procedure/Surgery:  L ECHO 24  Precautions:  L hip posterolateral precautions, WBAT LLE, Hgb 7.7 24 receiving blood transfusion this afternoon.    SUBJECTIVE:    Pt lives alone in a 2nd floor apt with 2 stairs and 1 rail to enter.  There are 7 steps and 2 rails wide apart to her 2nd floor apt.  Pt ambulated with a cane PTA.  She has a ww.     Pt planning on staying with her daughter at time of discharge and she has a 1 story home with 1+1+1 platform steps to enter the home.      OBJECTIVE:   Initial Evaluation  Date: 24 Treatment Short Term/ Long Term   Goals   Was pt agreeable to Eval/treatment? yes yes    Does pt have pain? C/o L hip pain 6/10 No c/o pain at rest, L hip pain increased with amb    Bed Mobility  Rolling: supervision  Supine to sit: supervision  Sit to supine: MIN A  Scooting: supervision Rolling: Independent  Supine to sit: Independent  Sit to supine: MIN A LLE  Scooting: Independent  Independent    Transfers Sit to stand: SBA  Stand to sit: SBA  Stand pivot: SBA with ww Sit to stand: supervision  Stand to sit: supervision  Stand pivot: SBA with ww Independent    Ambulation   200 feet x2 reps with ww  feet with ww  feet with ww supervision   Stair negotiation: ascended and descended 3 steps with 2 rails and 1 step with ww SBA NA 3 steps with 2 rails and 1 step with ww supervision   AM-PAC 6 Clicks       BLE ROM is WFL, L hip is WFL allowed by precautions.   RLE strength is grossly 4/5 to 4+/5 and LLE is grossly 3-/5 to 4/5.   Sensation:  Pt denies numbness and tingling to 
Physical Therapy  Facility/Department: [address] INTERMEDIATE MED SURG  Treatment Note    Name: Annmarie Zhu  : 1950  MRN: [id number]  Date of Service: 5/3/2024    Attending Provider:  Kavon Alexandra MD    Evaluating PT:  Bruce Mccormick Jr., P.T.    Room #:  0747/0747-A  Diagnosis:  Primary osteoarthritis of left hip [M16.12]  Pertinent PMHx/PSHx:  R ECHO 10/4/21, anemia  Procedure/Surgery:  L ECHO 24  Precautions:  L hip posterolateral precautions, WBAT LLE, Hgb 7.7 24    SUBJECTIVE:    Pt lives alone in a 2nd floor apt with 2 stairs and 1 rail to enter.  There are 7 steps and 2 rails wide apart to her 2nd floor apt.  Pt ambulated with a cane PTA.  She has a ww.     Pt planning on staying with her daughter at time of discharge and she has a 1 story home with 1+1+1 platform steps to enter the home.      OBJECTIVE:   Initial Evaluation  Date: 24 Treatment Short Term/ Long Term   Goals   Was pt agreeable to Eval/treatment? yes yes    Does pt have pain? C/o L hip pain 6/10 No c/o pain at rest, but pain increased with movement    Bed Mobility  Rolling: supervision  Supine to sit: supervision  Sit to supine: MIN A  Scooting: supervision Rolling: Independent  Supine to sit: MIN A  Sit to supine: NA  Scooting: Independent  Independent    Transfers Sit to stand: SBA  Stand to sit: SBA  Stand pivot: SBA with ww Sit to stand: supervision  Stand to sit: supervision  Stand pivot: supervision with ww Independent    Ambulation   200 feet x2 reps with ww SBA 15+450 feet with ww supervision 250 feet with ww supervision   Stair negotiation: ascended and descended 3 steps with 2 rails and 1 step with ww SBA 3 steps with 2 rails and 1 step with ww SBA 3 steps with 2 rails and 1 step with ww supervision   AM-PAC 6 Clicks       BLE ROM is WFL, L hip is WFL allowed by precautions.   RLE strength is grossly 4/5 to 4+/5 and LLE is grossly 3-/5 to 4/5.   Sensation:  Pt denies numbness and tingling to 
SPIRITUAL HEALTH SERVICES - [facility] Encounter    Name: Annmarie Zhu                  Referral: Routine Visit    Sacraments  Anointed (Last Rites): No  Apostolic [place]: No  Confession: No  Communion: No     Assessment:  Patient had been discharged.      Intervention:  None.     Outcome:  None.    Plan:  None.      Electronically signed by Chaplain Judy, on 5/3/2024 at 4:40 PM.  Spiritual Care Department  [facility]  [phone]   
    Treatment: OT treatment provided this date included:   Instruction/training on safety during completion of ADLs.    Instruction/training on safety during functional transfers/mobility.   Instruction/training on energy conservation/work simplification.    Instruction/training on bed mobility and dynamic balance for increased independence with self care.    Patient education provided regardin) OT role/purpose and plan of care 2)use of call light for assistance 3)hip precautions 4)WBAT LLE. Patient verbalized understanding.    Further skilled OT treatment indicated to increase patient's safety and independence with completion of ADL/IADL tasks in order to maximize patient's functional independence and quality of life.    Rehab Potential: Good for established goals.  Patient / Family Goal: to go home  Patient and/or family were instructed on functional diagnosis, prognosis/goals, and OT plan of care. Verbalized understanding.    Eval Complexity: low     Time In: 1500  Time Out: 1533  Total Treatment Time: 18 minutes      Minutes Units   OT Eval Low 34795 15 1   OT Eval Medium 24436     OT Eval High 89073     OT Re-Eval 28858     Therapeutic Ex 28430     Therapeutic Activities 26366 18 1   ADL/Self Care 86945     Orthotic Management 43609     Neuro Re-Ed 88965     Non-Billable Time N/A ---     Evaluation time includes thorough review of current medical information, gathering information on past medical history/social history and prior level of function, completion of standardized testing/informal observation of tasks, assessment of data, and education on plan of care and goals.    Bria Cuevas OTR/L  License Number: [id number]

## 2024-05-06 LAB — SURGICAL PATHOLOGY REPORT: NORMAL

## 2024-12-10 ENCOUNTER — HOSPITAL ENCOUNTER (EMERGENCY)
Age: 74
Discharge: ANOTHER HOSPITAL | End: 2024-12-10
Attending: EMERGENCY MEDICINE
Payer: MEDICARE

## 2024-12-10 ENCOUNTER — APPOINTMENT (OUTPATIENT)
Dept: CT IMAGING | Age: 74
End: 2024-12-10
Payer: MEDICARE

## 2024-12-10 ENCOUNTER — HOSPITAL ENCOUNTER (INPATIENT)
Age: 74
LOS: 4 days | Discharge: HOME | End: 2024-12-14
Attending: INTERNAL MEDICINE | Admitting: INTERNAL MEDICINE
Payer: MEDICARE

## 2024-12-10 ENCOUNTER — APPOINTMENT (OUTPATIENT)
Dept: GENERAL RADIOLOGY | Age: 74
End: 2024-12-10
Payer: MEDICARE

## 2024-12-10 VITALS
HEART RATE: 68 BPM | SYSTOLIC BLOOD PRESSURE: 131 MMHG | DIASTOLIC BLOOD PRESSURE: 50 MMHG | TEMPERATURE: 97.9 F | RESPIRATION RATE: 18 BRPM | OXYGEN SATURATION: 95 %

## 2024-12-10 DIAGNOSIS — R42 DIZZINESS: Primary | ICD-10-CM

## 2024-12-10 DIAGNOSIS — D64.9 ANEMIA, UNSPECIFIED TYPE: ICD-10-CM

## 2024-12-10 DIAGNOSIS — R79.89 ELEVATED TROPONIN: ICD-10-CM

## 2024-12-10 DIAGNOSIS — R42 ORTHOSTATIC DIZZINESS: ICD-10-CM

## 2024-12-10 DIAGNOSIS — R55 SYNCOPE AND COLLAPSE: Primary | ICD-10-CM

## 2024-12-10 LAB
ALBUMIN SERPL-MCNC: 3.8 G/DL (ref 3.5–5.2)
ALP SERPL-CCNC: 93 U/L (ref 35–104)
ALT SERPL-CCNC: 14 U/L (ref 0–32)
ANION GAP SERPL CALCULATED.3IONS-SCNC: 11 MMOL/L (ref 7–16)
AST SERPL-CCNC: 30 U/L (ref 0–31)
BASOPHILS # BLD: 0.03 K/UL (ref 0–0.2)
BASOPHILS NFR BLD: 1 % (ref 0–2)
BILIRUB SERPL-MCNC: 0.4 MG/DL (ref 0–1.2)
BILIRUB UR QL STRIP: NEGATIVE
BUN SERPL-MCNC: 27 MG/DL (ref 6–23)
CALCIUM SERPL-MCNC: 9.7 MG/DL (ref 8.6–10.2)
CHLORIDE SERPL-SCNC: 108 MMOL/L (ref 98–107)
CLARITY UR: CLEAR
CO2 SERPL-SCNC: 24 MMOL/L (ref 22–29)
COLOR UR: YELLOW
CREAT SERPL-MCNC: 0.7 MG/DL (ref 0.5–1)
EKG ATRIAL RATE: 69 BPM
EKG P AXIS: 37 DEGREES
EKG P-R INTERVAL: 150 MS
EKG Q-T INTERVAL: 404 MS
EKG QRS DURATION: 104 MS
EKG QTC CALCULATION (BAZETT): 432 MS
EKG R AXIS: -20 DEGREES
EKG T AXIS: 50 DEGREES
EKG VENTRICULAR RATE: 69 BPM
EOSINOPHIL # BLD: 0.08 K/UL (ref 0.05–0.5)
EOSINOPHILS RELATIVE PERCENT: 2 % (ref 0–6)
EPI CELLS #/AREA URNS HPF: ABNORMAL /HPF
ERYTHROCYTE [DISTWIDTH] IN BLOOD BY AUTOMATED COUNT: 14 % (ref 11.5–15)
FLUAV RNA RESP QL NAA+PROBE: NOT DETECTED
FLUBV RNA RESP QL NAA+PROBE: NOT DETECTED
GFR, ESTIMATED: >90 ML/MIN/1.73M2
GLUCOSE SERPL-MCNC: 96 MG/DL (ref 74–99)
GLUCOSE UR STRIP-MCNC: NEGATIVE MG/DL
HCT VFR BLD AUTO: 31.8 % (ref 34–48)
HGB BLD-MCNC: 10.4 G/DL (ref 11.5–15.5)
HGB UR QL STRIP.AUTO: NEGATIVE
IMM GRANULOCYTES # BLD AUTO: 0.03 K/UL (ref 0–0.58)
IMM GRANULOCYTES NFR BLD: 1 % (ref 0–5)
KETONES UR STRIP-MCNC: NEGATIVE MG/DL
LACTATE BLDV-SCNC: 0.8 MMOL/L (ref 0.5–2.2)
LEUKOCYTE ESTERASE UR QL STRIP: ABNORMAL
LYMPHOCYTES NFR BLD: 0.73 K/UL (ref 1.5–4)
LYMPHOCYTES RELATIVE PERCENT: 22 % (ref 20–42)
MAGNESIUM SERPL-MCNC: 2.1 MG/DL (ref 1.6–2.6)
MCH RBC QN AUTO: 33.4 PG (ref 26–35)
MCHC RBC AUTO-ENTMCNC: 32.7 G/DL (ref 32–34.5)
MCV RBC AUTO: 102.3 FL (ref 80–99.9)
MONOCYTES NFR BLD: 0.38 K/UL (ref 0.1–0.95)
MONOCYTES NFR BLD: 12 % (ref 2–12)
NEUTROPHILS NFR BLD: 62 % (ref 43–80)
NEUTS SEG NFR BLD: 2.06 K/UL (ref 1.8–7.3)
NITRITE UR QL STRIP: NEGATIVE
PH UR STRIP: 6 [PH] (ref 5–9)
PLATELET # BLD AUTO: 128 K/UL (ref 130–450)
PMV BLD AUTO: 8.9 FL (ref 7–12)
POTASSIUM SERPL-SCNC: 4.6 MMOL/L (ref 3.5–5)
PROT SERPL-MCNC: 6.4 G/DL (ref 6.4–8.3)
PROT UR STRIP-MCNC: NEGATIVE MG/DL
RBC # BLD AUTO: 3.11 M/UL (ref 3.5–5.5)
RBC #/AREA URNS HPF: ABNORMAL /HPF
SARS-COV-2 RNA RESP QL NAA+PROBE: NOT DETECTED
SODIUM SERPL-SCNC: 143 MMOL/L (ref 132–146)
SOURCE: NORMAL
SP GR UR STRIP: 1.01 (ref 1–1.03)
SPECIMEN DESCRIPTION: NORMAL
TROPONIN I SERPL HS-MCNC: 52 NG/L (ref 0–9)
TROPONIN I SERPL HS-MCNC: 52 NG/L (ref 0–9)
TROPONIN I SERPL HS-MCNC: 55 NG/L (ref 0–9)
UROBILINOGEN UR STRIP-ACNC: 0.2 EU/DL (ref 0–1)
WBC #/AREA URNS HPF: ABNORMAL /HPF
WBC OTHER # BLD: 3.3 K/UL (ref 4.5–11.5)

## 2024-12-10 PROCEDURE — 6370000000 HC RX 637 (ALT 250 FOR IP): Performed by: EMERGENCY MEDICINE

## 2024-12-10 PROCEDURE — 71045 X-RAY EXAM CHEST 1 VIEW: CPT

## 2024-12-10 PROCEDURE — 93010 ELECTROCARDIOGRAM REPORT: CPT | Performed by: INTERNAL MEDICINE

## 2024-12-10 PROCEDURE — 2580000003 HC RX 258: Performed by: EMERGENCY MEDICINE

## 2024-12-10 PROCEDURE — 6360000004 HC RX CONTRAST MEDICATION: Performed by: RADIOLOGY

## 2024-12-10 PROCEDURE — 99285 EMERGENCY DEPT VISIT HI MDM: CPT

## 2024-12-10 PROCEDURE — 70450 CT HEAD/BRAIN W/O DYE: CPT

## 2024-12-10 PROCEDURE — 87086 URINE CULTURE/COLONY COUNT: CPT

## 2024-12-10 PROCEDURE — 93005 ELECTROCARDIOGRAM TRACING: CPT | Performed by: EMERGENCY MEDICINE

## 2024-12-10 PROCEDURE — 81001 URINALYSIS AUTO W/SCOPE: CPT

## 2024-12-10 PROCEDURE — 83605 ASSAY OF LACTIC ACID: CPT

## 2024-12-10 PROCEDURE — 80053 COMPREHEN METABOLIC PANEL: CPT

## 2024-12-10 PROCEDURE — 83735 ASSAY OF MAGNESIUM: CPT

## 2024-12-10 PROCEDURE — 2060000000 HC ICU INTERMEDIATE R&B

## 2024-12-10 PROCEDURE — 85025 COMPLETE CBC W/AUTO DIFF WBC: CPT

## 2024-12-10 PROCEDURE — 70496 CT ANGIOGRAPHY HEAD: CPT

## 2024-12-10 PROCEDURE — 87636 SARSCOV2 & INF A&B AMP PRB: CPT

## 2024-12-10 PROCEDURE — 70498 CT ANGIOGRAPHY NECK: CPT

## 2024-12-10 PROCEDURE — 84484 ASSAY OF TROPONIN QUANT: CPT

## 2024-12-10 RX ORDER — ASPIRIN 81 MG/1
81 TABLET ORAL DAILY
COMMUNITY

## 2024-12-10 RX ORDER — PALIPERIDONE 3 MG/1
3 TABLET, EXTENDED RELEASE ORAL EVERY MORNING
Status: CANCELLED | OUTPATIENT
Start: 2024-12-11

## 2024-12-10 RX ORDER — SENNA AND DOCUSATE SODIUM 50; 8.6 MG/1; MG/1
1 TABLET, FILM COATED ORAL 2 TIMES DAILY
Status: CANCELLED | OUTPATIENT
Start: 2024-12-10

## 2024-12-10 RX ORDER — ALPRAZOLAM 0.25 MG/1
0.5 TABLET ORAL
Status: COMPLETED | OUTPATIENT
Start: 2024-12-10 | End: 2024-12-10

## 2024-12-10 RX ORDER — ASPIRIN 81 MG/1
81 TABLET ORAL DAILY
Status: CANCELLED | OUTPATIENT
Start: 2024-12-10

## 2024-12-10 RX ORDER — SODIUM CHLORIDE 0.9 % (FLUSH) 0.9 %
5-40 SYRINGE (ML) INJECTION EVERY 12 HOURS SCHEDULED
Status: CANCELLED | OUTPATIENT
Start: 2024-12-10

## 2024-12-10 RX ORDER — ONDANSETRON 4 MG/1
4 TABLET, ORALLY DISINTEGRATING ORAL EVERY 8 HOURS PRN
Status: CANCELLED | OUTPATIENT
Start: 2024-12-10

## 2024-12-10 RX ORDER — ONDANSETRON 2 MG/ML
4 INJECTION INTRAMUSCULAR; INTRAVENOUS EVERY 6 HOURS PRN
Status: CANCELLED | OUTPATIENT
Start: 2024-12-10

## 2024-12-10 RX ORDER — SODIUM CHLORIDE 9 MG/ML
INJECTION, SOLUTION INTRAVENOUS CONTINUOUS
Status: DISCONTINUED | OUTPATIENT
Start: 2024-12-10 | End: 2024-12-10 | Stop reason: HOSPADM

## 2024-12-10 RX ORDER — SODIUM CHLORIDE 0.9 % (FLUSH) 0.9 %
10 SYRINGE (ML) INJECTION PRN
Status: CANCELLED | OUTPATIENT
Start: 2024-12-10

## 2024-12-10 RX ORDER — POLYETHYLENE GLYCOL 3350 17 G/17G
17 POWDER, FOR SOLUTION ORAL DAILY PRN
Status: CANCELLED | OUTPATIENT
Start: 2024-12-10

## 2024-12-10 RX ORDER — TRAMADOL HYDROCHLORIDE 50 MG/1
50 TABLET ORAL ONCE
Status: COMPLETED | OUTPATIENT
Start: 2024-12-10 | End: 2024-12-10

## 2024-12-10 RX ORDER — CHLORAL HYDRATE 500 MG
1 CAPSULE ORAL DAILY
Status: CANCELLED | OUTPATIENT
Start: 2024-12-10

## 2024-12-10 RX ORDER — ALPRAZOLAM 0.25 MG/1
0.5 TABLET ORAL 3 TIMES DAILY
Status: CANCELLED | OUTPATIENT
Start: 2024-12-10

## 2024-12-10 RX ORDER — 0.9 % SODIUM CHLORIDE 0.9 %
1000 INTRAVENOUS SOLUTION INTRAVENOUS ONCE
Status: COMPLETED | OUTPATIENT
Start: 2024-12-10 | End: 2024-12-10

## 2024-12-10 RX ORDER — UBIDECARENONE 75 MG
50 CAPSULE ORAL DAILY
Status: CANCELLED | OUTPATIENT
Start: 2024-12-10

## 2024-12-10 RX ORDER — ACETAMINOPHEN 650 MG/1
650 SUPPOSITORY RECTAL EVERY 6 HOURS PRN
Status: CANCELLED | OUTPATIENT
Start: 2024-12-10

## 2024-12-10 RX ORDER — SODIUM CHLORIDE 9 MG/ML
INJECTION, SOLUTION INTRAVENOUS PRN
Status: CANCELLED | OUTPATIENT
Start: 2024-12-10

## 2024-12-10 RX ORDER — ENOXAPARIN SODIUM 100 MG/ML
40 INJECTION SUBCUTANEOUS DAILY
Status: CANCELLED | OUTPATIENT
Start: 2024-12-10

## 2024-12-10 RX ORDER — MECLIZINE HCL 12.5 MG 12.5 MG/1
25 TABLET ORAL ONCE
Status: COMPLETED | OUTPATIENT
Start: 2024-12-10 | End: 2024-12-10

## 2024-12-10 RX ORDER — SODIUM CHLORIDE 9 MG/ML
INJECTION, SOLUTION INTRAVENOUS CONTINUOUS
Status: CANCELLED | OUTPATIENT
Start: 2024-12-10

## 2024-12-10 RX ORDER — ACETAMINOPHEN 325 MG/1
650 TABLET ORAL EVERY 6 HOURS PRN
Status: CANCELLED | OUTPATIENT
Start: 2024-12-10

## 2024-12-10 RX ORDER — IOPAMIDOL 755 MG/ML
100 INJECTION, SOLUTION INTRAVASCULAR
Status: COMPLETED | OUTPATIENT
Start: 2024-12-10 | End: 2024-12-10

## 2024-12-10 RX ADMIN — MECLIZINE 25 MG: 12.5 TABLET ORAL at 12:30

## 2024-12-10 RX ADMIN — ALPRAZOLAM 0.5 MG: 0.25 TABLET ORAL at 14:02

## 2024-12-10 RX ADMIN — SODIUM CHLORIDE 1000 ML: 9 INJECTION, SOLUTION INTRAVENOUS at 12:33

## 2024-12-10 RX ADMIN — IOPAMIDOL 100 ML: 755 INJECTION, SOLUTION INTRAVENOUS at 17:31

## 2024-12-10 RX ADMIN — TRAMADOL HYDROCHLORIDE 50 MG: 50 TABLET ORAL at 16:33

## 2024-12-10 RX ADMIN — SODIUM CHLORIDE: 9 INJECTION, SOLUTION INTRAVENOUS at 16:36

## 2024-12-10 ASSESSMENT — PAIN DESCRIPTION - DESCRIPTORS: DESCRIPTORS: ACHING;TENDER;DISCOMFORT

## 2024-12-10 ASSESSMENT — PAIN DESCRIPTION - LOCATION: LOCATION: NECK

## 2024-12-10 ASSESSMENT — PAIN - FUNCTIONAL ASSESSMENT
PAIN_FUNCTIONAL_ASSESSMENT: PREVENTS OR INTERFERES WITH MANY ACTIVE NOT PASSIVE ACTIVITIES
PAIN_FUNCTIONAL_ASSESSMENT: NONE - DENIES PAIN

## 2024-12-10 ASSESSMENT — PAIN DESCRIPTION - ORIENTATION: ORIENTATION: RIGHT;LEFT

## 2024-12-10 ASSESSMENT — PAIN SCALES - GENERAL: PAINLEVEL_OUTOF10: 7

## 2024-12-10 NOTE — ED PROVIDER NOTES
[facility] EMERGENCY DEPARTMENT  EMERGENCY DEPARTMENT ENCOUNTER        Pt Name: Annmarie Zhu  MRN: [id number]  Birthdate 1950  Date of evaluation: 12/10/2024  Provider: Lulu Rollins DO  PCP: Bartolome Orlando III, DO  Note Started: 12:01 PM EST 12/10/24    CHIEF COMPLAINT       Chief Complaint   Patient presents with    Dizziness     Started about 1-1.5 hrs ago while lying in bed.       HISTORY OF PRESENT ILLNESS: 1 or more Elements   History From: patient    Limitations to history : None    Annmarie Zhu is a 74 y.o. female who presents with dizziness, worse with standing up, beginning around 8am this morning.  She states she normally gets around okay at home, sometimes with a cane but was so dizzy today upon standing she could not get around.  The complaint has been persistent.  She reports it makes her unsteady and nauseated but did not vomit.  She denies any recent infectious symptoms including sinus congestion, cough, fever or chills.  She denies head injury, neck pain, focal paresthesias, focal weakness, slurred speech, difficulty word finding, difficulty swallowing.  Patient denies fever/chills, sore throat, cough, congestion, chest pain, shortness of breath, edema, headache, visual disturbances, focal paresthesias, focal weakness, abdominal pain, vomiting, diarrhea, constipation, dysuria, hematuria, trauma, neck or back pain, rash or other complaints.      Nursing Notes were all reviewed and agreed with or any disagreements were addressed in the HPI.    REVIEW OF SYSTEMS :           Positives and Pertinent negatives as per HPI.     SURGICAL HISTORY     Past Surgical History:   Procedure Laterality Date    BONE MARROW BIOPSY  09/12/2016    left buttocks area    CATARACT REMOVAL Bilateral     CHOLECYSTECTOMY  12/23/2013    COLONOSCOPY      HYSTERECTOMY (CERVIX STATUS UNKNOWN)      JOINT REPLACEMENT Right     total right hip    NECK SURGERY      x 3... Limited

## 2024-12-10 NOTE — ED NOTES
Attempted to ambulate patient, upon sitting up patient immediately got dizzy and was unsteady. Will attempt again at a later time.

## 2024-12-10 NOTE — ED NOTES
orthostatic blood pressure completed, patient stated she was very dizzy & she was very unstable requiring assistance to be held up. Patient states she usually gets around on her own, states when she moves her head dizziness gets worse.

## 2024-12-11 PROBLEM — R42 DIZZINESS: Status: ACTIVE | Noted: 2024-12-11

## 2024-12-11 PROBLEM — R79.89 ELEVATED TROPONIN: Status: ACTIVE | Noted: 2024-12-11

## 2024-12-11 LAB
ALBUMIN SERPL-MCNC: 3.1 G/DL (ref 3.5–5.2)
ALP SERPL-CCNC: 80 U/L (ref 35–104)
ALT SERPL-CCNC: 12 U/L (ref 0–32)
ANION GAP SERPL CALCULATED.3IONS-SCNC: 8 MMOL/L (ref 7–16)
AST SERPL-CCNC: 31 U/L (ref 0–31)
BASOPHILS # BLD: 0.02 K/UL (ref 0–0.2)
BASOPHILS NFR BLD: 1 % (ref 0–2)
BILIRUB SERPL-MCNC: 0.4 MG/DL (ref 0–1.2)
BNP SERPL-MCNC: 1982 PG/ML (ref 0–450)
BUN SERPL-MCNC: 16 MG/DL (ref 6–23)
CALCIUM SERPL-MCNC: 8.6 MG/DL (ref 8.6–10.2)
CHLORIDE SERPL-SCNC: 111 MMOL/L (ref 98–107)
CO2 SERPL-SCNC: 20 MMOL/L (ref 22–29)
CREAT SERPL-MCNC: 0.6 MG/DL (ref 0.5–1)
EOSINOPHIL # BLD: 0.05 K/UL (ref 0.05–0.5)
EOSINOPHILS RELATIVE PERCENT: 2 % (ref 0–6)
ERYTHROCYTE [DISTWIDTH] IN BLOOD BY AUTOMATED COUNT: 13.9 % (ref 11.5–15)
GFR, ESTIMATED: >90 ML/MIN/1.73M2
GLUCOSE SERPL-MCNC: 93 MG/DL (ref 74–99)
HCT VFR BLD AUTO: 28.1 % (ref 34–48)
HGB BLD-MCNC: 8.9 G/DL (ref 11.5–15.5)
IMM GRANULOCYTES # BLD AUTO: 0.03 K/UL (ref 0–0.58)
IMM GRANULOCYTES NFR BLD: 1 % (ref 0–5)
LYMPHOCYTES NFR BLD: 0.93 K/UL (ref 1.5–4)
LYMPHOCYTES RELATIVE PERCENT: 31 % (ref 20–42)
MCH RBC QN AUTO: 33.8 PG (ref 26–35)
MCHC RBC AUTO-ENTMCNC: 31.7 G/DL (ref 32–34.5)
MCV RBC AUTO: 106.8 FL (ref 80–99.9)
MICROORGANISM SPEC CULT: ABNORMAL
MONOCYTES NFR BLD: 0.38 K/UL (ref 0.1–0.95)
MONOCYTES NFR BLD: 13 % (ref 2–12)
NEUTROPHILS NFR BLD: 54 % (ref 43–80)
NEUTS SEG NFR BLD: 1.63 K/UL (ref 1.8–7.3)
PLATELET # BLD AUTO: 106 K/UL (ref 130–450)
PMV BLD AUTO: 9.1 FL (ref 7–12)
POTASSIUM SERPL-SCNC: 3.9 MMOL/L (ref 3.5–5)
PROT SERPL-MCNC: 5.4 G/DL (ref 6.4–8.3)
RBC # BLD AUTO: 2.63 M/UL (ref 3.5–5.5)
SERVICE CMNT-IMP: ABNORMAL
SODIUM SERPL-SCNC: 139 MMOL/L (ref 132–146)
SPECIMEN DESCRIPTION: ABNORMAL
TSH SERPL DL<=0.05 MIU/L-ACNC: 0.66 UIU/ML (ref 0.27–4.2)
WBC OTHER # BLD: 3 K/UL (ref 4.5–11.5)

## 2024-12-11 PROCEDURE — 99223 1ST HOSP IP/OBS HIGH 75: CPT | Performed by: INTERNAL MEDICINE

## 2024-12-11 PROCEDURE — 2060000000 HC ICU INTERMEDIATE R&B

## 2024-12-11 PROCEDURE — 83880 ASSAY OF NATRIURETIC PEPTIDE: CPT

## 2024-12-11 PROCEDURE — 2580000003 HC RX 258: Performed by: NURSE PRACTITIONER

## 2024-12-11 PROCEDURE — 6360000002 HC RX W HCPCS: Performed by: NURSE PRACTITIONER

## 2024-12-11 PROCEDURE — APPSS180 APP SPLIT SHARED TIME > 60 MINUTES: Performed by: NURSE PRACTITIONER

## 2024-12-11 PROCEDURE — 36415 COLL VENOUS BLD VENIPUNCTURE: CPT

## 2024-12-11 PROCEDURE — 97161 PT EVAL LOW COMPLEX 20 MIN: CPT

## 2024-12-11 PROCEDURE — 80053 COMPREHEN METABOLIC PANEL: CPT

## 2024-12-11 PROCEDURE — 84443 ASSAY THYROID STIM HORMONE: CPT

## 2024-12-11 PROCEDURE — 97165 OT EVAL LOW COMPLEX 30 MIN: CPT

## 2024-12-11 PROCEDURE — 6370000000 HC RX 637 (ALT 250 FOR IP): Performed by: NURSE PRACTITIONER

## 2024-12-11 PROCEDURE — 85025 COMPLETE CBC W/AUTO DIFF WBC: CPT

## 2024-12-11 RX ORDER — ASPIRIN 81 MG/1
81 TABLET ORAL DAILY
Status: DISCONTINUED | OUTPATIENT
Start: 2024-12-11 | End: 2024-12-14 | Stop reason: HOSPADM

## 2024-12-11 RX ORDER — SODIUM CHLORIDE 0.9 % (FLUSH) 0.9 %
10 SYRINGE (ML) INJECTION PRN
Status: DISCONTINUED | OUTPATIENT
Start: 2024-12-11 | End: 2024-12-14 | Stop reason: HOSPADM

## 2024-12-11 RX ORDER — TRAMADOL HYDROCHLORIDE 50 MG/1
50 TABLET ORAL EVERY 6 HOURS
COMMUNITY
Start: 2024-11-04

## 2024-12-11 RX ORDER — SODIUM CHLORIDE 9 MG/ML
INJECTION, SOLUTION INTRAVENOUS CONTINUOUS
Status: DISCONTINUED | OUTPATIENT
Start: 2024-12-11 | End: 2024-12-13

## 2024-12-11 RX ORDER — UBIDECARENONE 75 MG
50 CAPSULE ORAL DAILY
Status: DISCONTINUED | OUTPATIENT
Start: 2024-12-11 | End: 2024-12-14 | Stop reason: HOSPADM

## 2024-12-11 RX ORDER — ENOXAPARIN SODIUM 100 MG/ML
40 INJECTION SUBCUTANEOUS DAILY
Status: DISCONTINUED | OUTPATIENT
Start: 2024-12-11 | End: 2024-12-14 | Stop reason: HOSPADM

## 2024-12-11 RX ORDER — PALIPERIDONE 3 MG/1
3 TABLET, EXTENDED RELEASE ORAL EVERY MORNING
Status: DISCONTINUED | OUTPATIENT
Start: 2024-12-11 | End: 2024-12-14 | Stop reason: HOSPADM

## 2024-12-11 RX ORDER — ACETAMINOPHEN 325 MG/1
650 TABLET ORAL EVERY 6 HOURS PRN
Status: DISCONTINUED | OUTPATIENT
Start: 2024-12-11 | End: 2024-12-14 | Stop reason: HOSPADM

## 2024-12-11 RX ORDER — ACETAMINOPHEN 650 MG/1
650 SUPPOSITORY RECTAL EVERY 6 HOURS PRN
Status: DISCONTINUED | OUTPATIENT
Start: 2024-12-11 | End: 2024-12-14 | Stop reason: HOSPADM

## 2024-12-11 RX ORDER — SODIUM CHLORIDE 9 MG/ML
INJECTION, SOLUTION INTRAVENOUS PRN
Status: DISCONTINUED | OUTPATIENT
Start: 2024-12-11 | End: 2024-12-14 | Stop reason: HOSPADM

## 2024-12-11 RX ORDER — CHLORAL HYDRATE 500 MG
1 CAPSULE ORAL DAILY
Status: DISCONTINUED | OUTPATIENT
Start: 2024-12-11 | End: 2024-12-11 | Stop reason: CLARIF

## 2024-12-11 RX ORDER — POLYETHYLENE GLYCOL 3350 17 G/17G
17 POWDER, FOR SOLUTION ORAL DAILY PRN
Status: DISCONTINUED | OUTPATIENT
Start: 2024-12-11 | End: 2024-12-14 | Stop reason: HOSPADM

## 2024-12-11 RX ORDER — ONDANSETRON 4 MG/1
4 TABLET, ORALLY DISINTEGRATING ORAL EVERY 8 HOURS PRN
Status: DISCONTINUED | OUTPATIENT
Start: 2024-12-11 | End: 2024-12-14 | Stop reason: HOSPADM

## 2024-12-11 RX ORDER — TRAMADOL HYDROCHLORIDE 50 MG/1
50 TABLET ORAL EVERY 6 HOURS PRN
Status: DISCONTINUED | OUTPATIENT
Start: 2024-12-11 | End: 2024-12-14 | Stop reason: HOSPADM

## 2024-12-11 RX ORDER — VITAMIN B COMPLEX
5000 TABLET ORAL DAILY
Status: DISCONTINUED | OUTPATIENT
Start: 2024-12-11 | End: 2024-12-14 | Stop reason: HOSPADM

## 2024-12-11 RX ORDER — ALPRAZOLAM 0.25 MG/1
0.5 TABLET ORAL 3 TIMES DAILY
Status: DISCONTINUED | OUTPATIENT
Start: 2024-12-11 | End: 2024-12-14 | Stop reason: HOSPADM

## 2024-12-11 RX ORDER — ONDANSETRON 2 MG/ML
4 INJECTION INTRAMUSCULAR; INTRAVENOUS EVERY 6 HOURS PRN
Status: DISCONTINUED | OUTPATIENT
Start: 2024-12-11 | End: 2024-12-14 | Stop reason: HOSPADM

## 2024-12-11 RX ORDER — M-VIT,TX,IRON,MINS/CALC/FOLIC 27MG-0.4MG
1 TABLET ORAL DAILY
Status: DISCONTINUED | OUTPATIENT
Start: 2024-12-11 | End: 2024-12-14 | Stop reason: HOSPADM

## 2024-12-11 RX ORDER — SENNA AND DOCUSATE SODIUM 50; 8.6 MG/1; MG/1
1 TABLET, FILM COATED ORAL 2 TIMES DAILY
Status: DISCONTINUED | OUTPATIENT
Start: 2024-12-11 | End: 2024-12-14 | Stop reason: HOSPADM

## 2024-12-11 RX ORDER — SODIUM CHLORIDE 0.9 % (FLUSH) 0.9 %
5-40 SYRINGE (ML) INJECTION EVERY 12 HOURS SCHEDULED
Status: DISCONTINUED | OUTPATIENT
Start: 2024-12-11 | End: 2024-12-14 | Stop reason: HOSPADM

## 2024-12-11 RX ORDER — SODIUM CHLORIDE 9 MG/ML
INJECTION, SOLUTION INTRAVENOUS CONTINUOUS
Status: DISCONTINUED | OUTPATIENT
Start: 2024-12-11 | End: 2024-12-11 | Stop reason: SDUPTHER

## 2024-12-11 RX ADMIN — TRAMADOL HYDROCHLORIDE 50 MG: 50 TABLET, COATED ORAL at 08:46

## 2024-12-11 RX ADMIN — ALPRAZOLAM 0.5 MG: 0.25 TABLET ORAL at 12:54

## 2024-12-11 RX ADMIN — ALPRAZOLAM 0.5 MG: 0.25 TABLET ORAL at 08:38

## 2024-12-11 RX ADMIN — ASPIRIN 81 MG: 81 TABLET, COATED ORAL at 08:39

## 2024-12-11 RX ADMIN — Medication 50 MCG: at 08:39

## 2024-12-11 RX ADMIN — VORTIOXETINE 20 MG: 10 TABLET, FILM COATED ORAL at 08:39

## 2024-12-11 RX ADMIN — SODIUM CHLORIDE, PRESERVATIVE FREE 10 ML: 5 INJECTION INTRAVENOUS at 19:47

## 2024-12-11 RX ADMIN — Medication 5000 UNITS: at 08:39

## 2024-12-11 RX ADMIN — SODIUM CHLORIDE: 9 INJECTION, SOLUTION INTRAVENOUS at 13:43

## 2024-12-11 RX ADMIN — ENOXAPARIN SODIUM 40 MG: 100 INJECTION SUBCUTANEOUS at 08:39

## 2024-12-11 RX ADMIN — PALIPERIDONE 3 MG: 3 TABLET, EXTENDED RELEASE ORAL at 08:39

## 2024-12-11 RX ADMIN — TRAMADOL HYDROCHLORIDE 50 MG: 50 TABLET, COATED ORAL at 19:46

## 2024-12-11 RX ADMIN — SODIUM CHLORIDE, PRESERVATIVE FREE 10 ML: 5 INJECTION INTRAVENOUS at 08:40

## 2024-12-11 RX ADMIN — WATER 1000 MG: 1 INJECTION INTRAMUSCULAR; INTRAVENOUS; SUBCUTANEOUS at 08:38

## 2024-12-11 RX ADMIN — ALPRAZOLAM 0.5 MG: 0.25 TABLET ORAL at 23:33

## 2024-12-11 RX ADMIN — Medication 1 TABLET: at 08:38

## 2024-12-11 RX ADMIN — SODIUM CHLORIDE: 9 INJECTION, SOLUTION INTRAVENOUS at 01:52

## 2024-12-11 ASSESSMENT — PAIN SCALES - GENERAL
PAINLEVEL_OUTOF10: 7

## 2024-12-11 ASSESSMENT — PAIN DESCRIPTION - DESCRIPTORS
DESCRIPTORS: ACHING;DISCOMFORT
DESCRIPTORS: ACHING

## 2024-12-11 ASSESSMENT — PAIN DESCRIPTION - LOCATION
LOCATION: NECK
LOCATION: NECK

## 2024-12-11 NOTE — PROGRESS NOTES
Stair negotiation: ascended and descended NT    4-8  steps with  1  rail with  SBA    LE ROM  WFL      LE strength  4-/ 5      AM- PAC RAW score  17/ 24            Pt is alert and Oriented      Balance: SBA/CGA with gait using ww . Fall risk due to [x]Decreased strength, [x] Decreased balance, [] Decreased safety awareness, [] Decreased activity tolerance.  [x] Other: dizziness   Endurance: decreased   Bed/Chair alarm:  yes      ASSESSMENT  Pt displays functional ability as noted in the objective portion of this evaluation.        Conditions Requiring Skilled Therapeutic Intervention:    [x]Decreased strength     []Decreased ROM  [x]Decreased functional mobility  [x]Decreased balance   [x]Decreased endurance   []Decreased posture  []Decreased sensation  []Decreased coordination   []Decreased vision  []Decreased safety awareness   []Increased pain         Comments:   Pt  in bed  upon arrival ; agreeable to PT. Mobility as above.  Pt report mild dizziness during session, but reports this is improving .   Ortho BP :   Supine 157/70  Sit 147/67  Stand  140/68    Treatment:  Pt was instructed on the following :   -Bed mobility : including sequencing and technique  -Transfers: hand placement, controlled movement with stand to sit  - Gait: proper use of ww and posture            Pt educated on fall risk, safety with mobility        Patient response to education:   Pt verbalized understanding Pt demonstrated skill Pt requires further education in this area   x  With cues   x       Comments:  Pt left  in chair after session, with call light in reach.      Rehab potential is Good for reaching above PT goals.    Pt’s/ family goals   1. None stated     Patient and or family understand(s) diagnosis, prognosis, and plan of care. -  yes    PLAN  PT care will be provided in accordance with the objectives noted above.  Whenever appropriate, clear delegation orders will be provided for nursing staff.  Exercises and functional

## 2024-12-11 NOTE — CONSULTS
INPATIENT CARDIOLOGY CONSULT     Reason for Consult: Syncope, elevated troponin    Cardiologist: Dr. Sumner    Requesting Physician:  Dr. Neely     Date of Consultation: 12/11/2024    HISTORY OF PRESENT ILLNESS:   Annmarie Zhu is a 74-year-old obese female who is known to Dr. Chuckie KUNZ only during hospitalization 1/2/2017 --> evaluated for syncope --patient underwent a nonischemic Lexiscan MPS, orthostatic BPs were negative patient was ordered an event monitor on discharge which was not completed.  Patient has had no follow-up with cardiology since that time.  See past medical history below.    Patient reported that she has been in her normal state of health.  She denies any recent viral infection, fever, cough or chills.  She stated that 1 month ago she had a fall.  She stated that she was walking into the kitchen where her granddaughter was and suddenly she wound up on the ground.  She is unclear if she had a syncopal event.  She did not seek medical attention at that time.  Patient lives in an apartment alone and uses a cane occasionally for ambulation secondary to bilateral hip pain and back pain.  Patient states that she woke up at approximately 7:30 AM on 12/10/2024.  She stated that she took her medications, was sitting in the living room and suddenly felt a new onset of a \"cold sensation.\"  She went to lay back in the bed and suddenly while she was laying there the room was spinning around her and felt nauseous with no emesis.  She stated that she attempted to sit up in bed but the room continued to spin.  Her symptoms lasted for approximately 3 to 4 hours and EMS was summoned.  Her symptoms are resolved with Antivert.  She presented to [facility] on 12/10/2024.  Patient denies chest pain, shortness of breath, palpitations or feeling of her heart racing.  She denies any loss of consciousness or near syncope.  Blood pressure 190/75, heart rate 71, afebrile, 100% on room air.  Sodium 143,  shows no acute intracranial abnormality.  2.  CTA of the head shows no stenosis or occlusion of the proximal  intracranial arteries.  3. CTA of the neck shows no stenosis of the cervical carotid arteries using  NASCET criteria.  4. Mild, less than 50% stenosis of the origin of the dominant left vertebral  artery from calcified plaque. The rest of the left vertebral artery is widely  patent. The non dominant right vertebral artery is widely patent.     CT head without contrast: 12/10/2024:  No acute intracranial abnormality    Chest x-ray: 12/10/2024:  No acute process.    Prior cardiac testing:  TTE: 5/22/2014: Mild AR, stage I diastolic dysfunction.  TTE: 1/2/2017 (Dr. Cruz): LVEF 55-65%, no pericardial effusion and no significant wall motion abnormality with normal diastolic function.  TTE: 10/6/2021 (Dr. Cody) LVEF 65% with normal RV function, mild mitral annular calcification, no intracardiac shunt.  The left atrium was mildly dilated.  Borderline concentric LVH.    Lexiscan MPS: 5/22/2014: Normal perfusion with normal LVEF.  Lexiscan MPS: 1/3/2017: LVEF 73% with no stress-induced ischemia.      ASSESSMENT:  Dizziness -- improved after receiving Antivert.  No arrhythmias noted on telemetry. Denies syncope or near syncope.   Orthostatic hypotension this admission: On IV fluids.  History of syncope 1/2017:  Orthostatic BP at that time negative, nonischemic Lexiscan MPS at that time.   Hypertension: most recent 's   Elevated high-sensitivity troponin: 55, 52, 52: Pattern flat not consistent with ACS.  No acute EKG changes.  Nonischemic Lexiscan MPS 1/3/2017.  Normal LVEF on TTE 10/6/2021 with no significant valvular heart disease.  Low-grade MDS: Follows with hematology.    +UTI: On Rocephin.  Urine culture pending.  Obesity: BMI 34.5  HLD  Arthritis  History of cervical cancer status post radiation hysterectomy 1995.  History of COVID-19 10/2021.  Urinary incontinence  Lifelong non-smoker  Chronic

## 2024-12-11 NOTE — PROGRESS NOTES
4 Eyes Skin Assessment     NAME:  Annmarie Zhu  YOB: 1950  MEDICAL RECORD NUMBER:  [id number]    The patient is being assessed for  Admission    I agree that at least one RN has performed a thorough Head to Toe Skin Assessment on the patient. ALL assessment sites listed below have been assessed.      Areas assessed by both nurses:    Head, Face, Ears, Shoulders, Back, Chest, Arms, Elbows, Hands, Sacrum. Buttock, Coccyx, Ischium, Legs. Feet and Heels, and Under Medical Devices         Does the Patient have a Wound? No noted wound(s)patient has b/l blanchable reddened areas on Both bottocks       Robby Prevention initiated by RN: Yes  Wound Care Orders initiated by RN: No    Pressure Injury (Stage 3,4, Unstageable, DTI, NWPT, and Complex wounds) if present, place Wound referral order by RN under : No    New Ostomies, if present place, Ostomy referral order under : No     Nurse 1 eSignature: Electronically signed by Imer Batres RN on 12/11/24 at 2:30 AM EST    **SHARE this note so that the co-signing nurse can place an eSignature**    Nurse 2 eSignature: Electronically signed by Deanna Danielson RN on 12/11/24 at 2:58 AM EST

## 2024-12-11 NOTE — CARE COORDINATION
Social Work / Discharge Planning :SW met with patient and explained role as discharge planner/ transition of care. Patient admitted with orthostatic Hypotension. AWait Cardiology plan and recommendations. ECHO ordered.Patient verified plan at discharge is HOME but she will be staying with her daughter. [facility] discussed and she doesn't feel she currently needs  it. She did have left total hip arthoplasty back in May and has a hx with [facility]. Hx at [facility]. Patient states normally independent with no device but does use a cane on occasion. Dr Orlando is her PCP and she uses [facility] on [address]. Patient on RA. AWait plan. SW to follow. Electronically signed by BRIAN Rader on 12/11/24 at 10:28 AM EST

## 2024-12-11 NOTE — PROGRESS NOTES
Occupational Therapy    OCCUPATIONAL THERAPY INITIAL EVALUATION    [facility]   [address], [place]         Date:2024                                                  Patient Name: Annmarie Zhu    MRN: [id number]    : 1950    Room: [address] [place] [address]      Evaluating OT: Cierra Whaley OTR/L   [id number]      Referring Provider:Maliha Neely M     Specific Provider Orders/Date:OT eval and treat 2024      Diagnosis:  Orthostatic hypotension [I95.1]     Pertinent Medical History: anxiety,  CA, IBS, L ECHO 2024    Precautions:  Fall Risk,      Assessment of current deficits    [x] Functional mobility  [x]ADLs  [x] Strength               []Cognition    [x] Functional transfers   [x] IADLs         [x] Safety Awareness   [x]Endurance    [] Fine Coordination              [x] Balance      [] Vision/perception   []Sensation     []Gross Motor Coordination  [] ROM  [] Delirium                   [] Motor Control     OT PLAN OF CARE   OT POC based on physician orders, patient diagnosis and results of clinical assessment    Frequency/Duration  2-4 days/wk for 2 - 4weeks PRN   Specific OT Treatment Interventions to include:   ADL retraining/adapted techniques and AE recommendations to increase functional independence within precautions                    Energy conservation techniques to improve tolerance for selfcare routine   Functional transfer/mobility training/DME recommendations for increased independence, safety and fall prevention         Patient/family education to increase safety and functional independence             Environmental modifications for safe mobility and completion of ADLs                             Therapeutic activity to improve functional performance during ADLs.                                         Therapeutic exercise to improve tolerance and functional strength for ADLs    Balance retraining/tolerance tasks for facilitation  tubes intact.  *ALARM ON     Overall patient demonstrated  decreased independence and safety during completion of ADL/functional transfer/mobility tasks.  Pt would benefit from continued skilled OT to increase safety and independence with completion of ADL/IADL tasks for functional independence and quality of life.    Rehab Potential: good for established goals     Patient / Family Goal: return home      Patient and/or family were instructed on functional diagnosis, prognosis/goals and OT plan of care. Demonstrated good  understanding.     Eval Complexity: Low    Time In: 1005  Time Out: 1021      Min Units   OT Eval Low 97165 x  1   OT Eval Medium 40787      OT Eval High 75374      OT Re-Eval 86927       Therapeutic Ex 24824      Therapeutic Activities 89646      ADL/Self Care 53817       Orthotic Management 18358       Manual 34058     Neuro Re-Ed 42801       Non-Billable Time      OT Re eval 99553        Evaluation Time additionally includes thorough review of current medical information, gathering information on past medical history/social history and prior level of function, interpretation of standardized testing/informal observation of tasks, assessment of data and development of plan of care and goals.            Cierra Whaley  OTR/L  OT [id number]

## 2024-12-11 NOTE — PROGRESS NOTES
Spiritual Health History and Assessment/Progress Note  [facility]    Initial Encounter, Attempted Encounter,  ,  ,      Name: Annmarie Zhu MRN: [id number]    Age: 74 y.o.     Sex: female   Language: English   Confucianism: Latter-day   Orthostatic hypotension     Date: 12/11/2024                           Spiritual Assessment began in [facility]S INTERMEDIATE        Referral/Consult From: Rounding   Encounter Overview/Reason: Initial Encounter, Attempted Encounter  Service Provided For: Patient, Patient not available    Jigna, Belief, Meaning:   Patient unable to assess at this time  Family/Friends No family/friends present      Importance and Influence:  Patient unable to assess at this time  Family/Friends No family/friends present    Community:  Patient feels well-supported. Support system includes: Children and Extended family  Family/Friends No family/friends present    Assessment and Plan of Care:     Patient Interventions include: Other: Staff addressing the patient while rounding, silent prayer was said for the patient at the time  Family/Friends Interventions include: No family/friends present    Patient Plan of Care: Spiritual Care available upon further referral  Family/Friends Plan of Care: No family/friends present    Electronically signed by Chaplain Debbie on 12/11/2024 at 1:36 PM

## 2024-12-11 NOTE — H&P
LYMPHOPCT 22 12/10/2024 12:38 PM    PROMYELOPCT 0.9 10/19/2021 09:05 AM    MONOPCT 12 12/10/2024 12:38 PM    EOSPCT 2 12/10/2024 12:38 PM    BASOPCT 1 12/10/2024 12:38 PM    MONOSABS 0.38 12/10/2024 12:38 PM    LYMPHSABS 0.73 12/10/2024 12:38 PM    EOSABS 0.08 12/10/2024 12:38 PM    BASOSABS 0.03 12/10/2024 12:38 PM     CMP:    Lab Results   Component Value Date/Time     12/10/2024 12:38 PM    K 4.6 12/10/2024 12:38 PM    K 4.0 10/19/2021 09:05 AM     12/10/2024 12:38 PM    CO2 24 12/10/2024 12:38 PM    BUN 27 12/10/2024 12:38 PM    CREATININE 0.7 12/10/2024 12:38 PM    GFRAA >60 10/23/2021 12:00 PM    LABGLOM >90 12/10/2024 12:38 PM    LABGLOM >90 2024 11:03 AM    GLUCOSE 96 12/10/2024 12:38 PM    GLUCOSE 118 2010 11:44 AM    CALCIUM 9.7 12/10/2024 12:38 PM    BILITOT 0.4 12/10/2024 12:38 PM    ALKPHOS 93 12/10/2024 12:38 PM    AST 30 12/10/2024 12:38 PM    ALT 14 12/10/2024 12:38 PM       Imagin/10/2024 CXR:  No acute process    12/10/2024 CT Head WO contrast:  No acute intracranial abnormality.     12/10/2024 CTA Head:  1. CT of the head shows no acute intracranial abnormality.   2.  CTA of the head shows no stenosis or occlusion of the proximal   intracranial arteries.   3. CTA of the neck shows no stenosis of the cervical carotid arteries using   NASCET criteria.   4. Mild, less than 50% stenosis of the origin of the dominant left vertebral   artery from calcified plaque. The rest of the left vertebral artery is widely   patent. The non dominant right vertebral artery is widely patent.     12/10/2024 CTA Neck:  1. CT of the head shows no acute intracranial abnormality.   2.  CTA of the head shows no stenosis or occlusion of the proximal   intracranial arteries.   3. CTA of the neck shows no stenosis of the cervical carotid arteries using   NASCET criteria.   4. Mild, less than 50% stenosis of the origin of the dominant left vertebral   artery from calcified plaque. The rest of the  left vertebral artery is widely   patent. The non dominant right vertebral artery is widely patent.     EKG:        Telemetry:  I've personally reviewed the patient's telemetry:  Sinus    ASSESSMENT:  Dizziness with nausea. Known Hx Syncope in 01/20217 with unremarkable TTE and Lexiscan at that time  Orthostatic Hypotension  Orthostatic Tachycardia  Troponin elevation without chest pain or acute ischemic EKG changes  Reported Hx Dysrhythmia (specifics unclear)  Known Hx HLD  Obesity  Anxiety with panic attacks  Depression  Arthritis  Cervical CA with radiation implant and hysterectomy in 1995   Known Hx IBS   Leukopenia  Anemia with H&H 10.4/31.8(12/10)  +UA, urine culture pending     PLAN:  Continue IV fluid hydration for initial orthostasis which has since resolved  Dizziness likely secondary to elevated blood pressures at home-monitor blood pressures, advised that patient do the same at home and record them for further adjustment of BP meds by PCP  Elevated BNP around 2000-she does not appear to be dyspneic  Monitor VS  Monitor Orthostatic BP  Continue telemetry monitoring  Cardiology consulted  Monitor BMP, CBC  Urine culture pending, continue Rocephin        Code status: FULL  Requires Inpatient level of care    Maliha Neely MD  12/11/2024

## 2024-12-11 NOTE — PROGRESS NOTES
[facility] Quality Flow/Interdisciplinary Rounds Progress Note        Quality Flow Rounds held on December 11, 2024    Disciplines Attending:  Bedside Nurse, , , and Nursing Unit Leadership    Annmarie Zhu was admitted on 12/10/2024 11:59 PM    Anticipated Discharge Date:       Disposition:    Robby Score:  Robby Scale Score: 19    Readmission Risk              Risk of Unplanned Readmission:  13           Discussed patient goal for the day, patient clinical progression, and barriers to discharge.  The following Goal(s) of the Day/Commitment(s) have been identified:   iv fluids, orthos, cardio eval, echo, iv abx pt ot eval      Arianne Harley RN  December 11, 2024

## 2024-12-11 NOTE — ACP (ADVANCE CARE PLANNING)
Advance Care Planning   Healthcare Decision Maker:    Primary Decision Maker: Jacy Zhu - Child - [phone]    Secondary Decision Maker: Austen Bauman - Brother/Sister - [phone]    Click here to complete Healthcare Decision Makers including selection of the Healthcare Decision Maker Relationship (ie \"Primary\").  Today we documented Decision Maker(s) consistent with Legal Next of Kin hierarchy.

## 2024-12-12 ENCOUNTER — APPOINTMENT (OUTPATIENT)
Age: 74
End: 2024-12-12
Attending: INTERNAL MEDICINE
Payer: MEDICARE

## 2024-12-12 LAB
ALBUMIN SERPL-MCNC: 3.7 G/DL (ref 3.5–5.2)
ALP SERPL-CCNC: 87 U/L (ref 35–104)
ALT SERPL-CCNC: 16 U/L (ref 0–32)
ANION GAP SERPL CALCULATED.3IONS-SCNC: 9 MMOL/L (ref 7–16)
AST SERPL-CCNC: 43 U/L (ref 0–31)
BASOPHILS # BLD: 0.02 K/UL (ref 0–0.2)
BASOPHILS NFR BLD: 1 % (ref 0–2)
BILIRUB SERPL-MCNC: 0.3 MG/DL (ref 0–1.2)
BUN SERPL-MCNC: 13 MG/DL (ref 6–23)
CALCIUM SERPL-MCNC: 9 MG/DL (ref 8.6–10.2)
CHLORIDE SERPL-SCNC: 109 MMOL/L (ref 98–107)
CHOLEST SERPL-MCNC: 190 MG/DL
CO2 SERPL-SCNC: 24 MMOL/L (ref 22–29)
CREAT SERPL-MCNC: 0.6 MG/DL (ref 0.5–1)
ECHO AO ASC DIAM: 2.6 CM
ECHO AO ASCENDING AORTA INDEX: 1.47 CM/M2
ECHO AR MAX VEL PISA: 5.4 M/S
ECHO AV AREA PEAK VELOCITY: 2.3 CM2
ECHO AV AREA VTI: 2.5 CM2
ECHO AV AREA/BSA PEAK VELOCITY: 1.3 CM2/M2
ECHO AV AREA/BSA VTI: 1.4 CM2/M2
ECHO AV CUSP MM: 1.8 CM
ECHO AV MEAN GRADIENT: 5 MMHG
ECHO AV MEAN VELOCITY: 1 M/S
ECHO AV PEAK GRADIENT: 8 MMHG
ECHO AV PEAK VELOCITY: 1.4 M/S
ECHO AV REGURGITANT PHT: 368.1 MILLISECOND
ECHO AV VELOCITY RATIO: 0.57
ECHO AV VTI: 32.6 CM
ECHO BSA: 1.84 M2
ECHO EST RA PRESSURE: 3 MMHG
ECHO LA VOL A-L A2C: 51 ML (ref 22–52)
ECHO LA VOL A-L A4C: 34 ML (ref 22–52)
ECHO LA VOL MOD A2C: 50 ML (ref 22–52)
ECHO LA VOL MOD A4C: 33 ML (ref 22–52)
ECHO LA VOLUME AREA LENGTH: 43 ML
ECHO LA VOLUME INDEX A-L A2C: 29 ML/M2 (ref 16–34)
ECHO LA VOLUME INDEX A-L A4C: 19 ML/M2 (ref 16–34)
ECHO LA VOLUME INDEX AREA LENGTH: 24 ML/M2 (ref 16–34)
ECHO LA VOLUME INDEX MOD A2C: 28 ML/M2 (ref 16–34)
ECHO LA VOLUME INDEX MOD A4C: 19 ML/M2 (ref 16–34)
ECHO LV EDV A2C: 50 ML
ECHO LV EDV A4C: 73 ML
ECHO LV EDV BP: 63 ML (ref 56–104)
ECHO LV EDV INDEX A4C: 41 ML/M2
ECHO LV EDV INDEX BP: 36 ML/M2
ECHO LV EDV NDEX A2C: 28 ML/M2
ECHO LV EF PHYSICIAN: 55 %
ECHO LV EJECTION FRACTION A2C: 52 %
ECHO LV EJECTION FRACTION A4C: 58 %
ECHO LV EJECTION FRACTION BIPLANE: 55 % (ref 55–100)
ECHO LV ESV A2C: 24 ML
ECHO LV ESV A4C: 30 ML
ECHO LV ESV BP: 29 ML (ref 19–49)
ECHO LV ESV INDEX A2C: 14 ML/M2
ECHO LV ESV INDEX A4C: 17 ML/M2
ECHO LV ESV INDEX BP: 16 ML/M2
ECHO LV FRACTIONAL SHORTENING: 26 % (ref 28–44)
ECHO LV INTERNAL DIMENSION DIASTOLE INDEX: 2.37 CM/M2
ECHO LV INTERNAL DIMENSION DIASTOLIC: 4.2 CM (ref 3.9–5.3)
ECHO LV INTERNAL DIMENSION SYSTOLIC INDEX: 1.75 CM/M2
ECHO LV INTERNAL DIMENSION SYSTOLIC: 3.1 CM
ECHO LV ISOVOLUMETRIC RELAXATION TIME (IVRT): 96.9 MS
ECHO LV IVSD: 1.2 CM (ref 0.6–0.9)
ECHO LV IVSS: 1.7 CM
ECHO LV MASS 2D: 200.6 G (ref 67–162)
ECHO LV MASS INDEX 2D: 113.3 G/M2 (ref 43–95)
ECHO LV POSTERIOR WALL DIASTOLIC: 1.4 CM (ref 0.6–0.9)
ECHO LV POSTERIOR WALL SYSTOLIC: 1.7 CM
ECHO LV RELATIVE WALL THICKNESS RATIO: 0.67
ECHO LVOT AREA: 3.8 CM2
ECHO LVOT AV VTI INDEX: 0.63
ECHO LVOT DIAM: 2.2 CM
ECHO LVOT MEAN GRADIENT: 2 MMHG
ECHO LVOT PEAK GRADIENT: 3 MMHG
ECHO LVOT PEAK VELOCITY: 0.8 M/S
ECHO LVOT STROKE VOLUME INDEX: 44 ML/M2
ECHO LVOT SV: 77.9 ML
ECHO LVOT VTI: 20.5 CM
ECHO MV "A" WAVE DURATION: 124.6 MSEC
ECHO MV A VELOCITY: 1.21 M/S
ECHO MV AREA PHT: 2.6 CM2
ECHO MV AREA VTI: 2.4 CM2
ECHO MV E DECELERATION TIME (DT): 203.5 MS
ECHO MV E VELOCITY: 1.01 M/S
ECHO MV E/A RATIO: 0.83
ECHO MV LVOT VTI INDEX: 1.6
ECHO MV MAX VELOCITY: 1.3 M/S
ECHO MV MEAN GRADIENT: 3 MMHG
ECHO MV MEAN VELOCITY: 0.8 M/S
ECHO MV PEAK GRADIENT: 7 MMHG
ECHO MV PRESSURE HALF TIME (PHT): 86 MS
ECHO MV VTI: 32.8 CM
ECHO PVEIN A DURATION: 124.6 MS
ECHO PVEIN A VELOCITY: 0.4 M/S
ECHO PVEIN PEAK D VELOCITY: 0.5 M/S
ECHO PVEIN PEAK S VELOCITY: 0.9 M/S
ECHO PVEIN S/D RATIO: 1.8
ECHO RIGHT VENTRICULAR SYSTOLIC PRESSURE (RVSP): 41 MMHG
ECHO RV FREE WALL PEAK S': 18 CM/S
ECHO RV INTERNAL DIMENSION: 2.8 CM
ECHO RV TAPSE: 3.1 CM (ref 1.7–?)
ECHO TV REGURGITANT MAX VELOCITY: 3.1 M/S
ECHO TV REGURGITANT PEAK GRADIENT: 38 MMHG
EOSINOPHIL # BLD: 0.1 K/UL (ref 0.05–0.5)
EOSINOPHILS RELATIVE PERCENT: 4 % (ref 0–6)
ERYTHROCYTE [DISTWIDTH] IN BLOOD BY AUTOMATED COUNT: 14.1 % (ref 11.5–15)
GFR, ESTIMATED: >90 ML/MIN/1.73M2
GLUCOSE SERPL-MCNC: 90 MG/DL (ref 74–99)
HCT VFR BLD AUTO: 32.2 % (ref 34–48)
HDLC SERPL-MCNC: 79 MG/DL
HGB BLD-MCNC: 10.2 G/DL (ref 11.5–15.5)
IMM GRANULOCYTES # BLD AUTO: <0.03 K/UL (ref 0–0.58)
IMM GRANULOCYTES NFR BLD: 1 % (ref 0–5)
LDLC SERPL CALC-MCNC: 99 MG/DL
LYMPHOCYTES NFR BLD: 0.77 K/UL (ref 1.5–4)
LYMPHOCYTES RELATIVE PERCENT: 32 % (ref 20–42)
MCH RBC QN AUTO: 33.7 PG (ref 26–35)
MCHC RBC AUTO-ENTMCNC: 31.7 G/DL (ref 32–34.5)
MCV RBC AUTO: 106.3 FL (ref 80–99.9)
MONOCYTES NFR BLD: 0.33 K/UL (ref 0.1–0.95)
MONOCYTES NFR BLD: 14 % (ref 2–12)
NEUTROPHILS NFR BLD: 49 % (ref 43–80)
NEUTS SEG NFR BLD: 1.17 K/UL (ref 1.8–7.3)
PLATELET # BLD AUTO: 107 K/UL (ref 130–450)
PMV BLD AUTO: 9.1 FL (ref 7–12)
POTASSIUM SERPL-SCNC: 3.8 MMOL/L (ref 3.5–5)
PROT SERPL-MCNC: 6.3 G/DL (ref 6.4–8.3)
RBC # BLD AUTO: 3.03 M/UL (ref 3.5–5.5)
SODIUM SERPL-SCNC: 142 MMOL/L (ref 132–146)
TRIGL SERPL-MCNC: 59 MG/DL
VLDLC SERPL CALC-MCNC: 12 MG/DL
WBC OTHER # BLD: 2.4 K/UL (ref 4.5–11.5)

## 2024-12-12 PROCEDURE — 6370000000 HC RX 637 (ALT 250 FOR IP): Performed by: NURSE PRACTITIONER

## 2024-12-12 PROCEDURE — 2060000000 HC ICU INTERMEDIATE R&B

## 2024-12-12 PROCEDURE — 93306 TTE W/DOPPLER COMPLETE: CPT | Performed by: INTERNAL MEDICINE

## 2024-12-12 PROCEDURE — 99233 SBSQ HOSP IP/OBS HIGH 50: CPT | Performed by: INTERNAL MEDICINE

## 2024-12-12 PROCEDURE — 80061 LIPID PANEL: CPT

## 2024-12-12 PROCEDURE — 80053 COMPREHEN METABOLIC PANEL: CPT

## 2024-12-12 PROCEDURE — 85025 COMPLETE CBC W/AUTO DIFF WBC: CPT

## 2024-12-12 PROCEDURE — 93306 TTE W/DOPPLER COMPLETE: CPT

## 2024-12-12 PROCEDURE — 2580000003 HC RX 258: Performed by: NURSE PRACTITIONER

## 2024-12-12 PROCEDURE — 6360000002 HC RX W HCPCS: Performed by: NURSE PRACTITIONER

## 2024-12-12 RX ADMIN — Medication 50 MCG: at 07:54

## 2024-12-12 RX ADMIN — SODIUM CHLORIDE: 9 INJECTION, SOLUTION INTRAVENOUS at 13:44

## 2024-12-12 RX ADMIN — ALPRAZOLAM 0.5 MG: 0.25 TABLET ORAL at 07:54

## 2024-12-12 RX ADMIN — ALPRAZOLAM 0.5 MG: 0.25 TABLET ORAL at 15:58

## 2024-12-12 RX ADMIN — DOCUSATE SODIUM 50 MG AND SENNOSIDES 8.6 MG 1 TABLET: 8.6; 5 TABLET, FILM COATED ORAL at 07:55

## 2024-12-12 RX ADMIN — ASPIRIN 81 MG: 81 TABLET, COATED ORAL at 07:54

## 2024-12-12 RX ADMIN — TRAMADOL HYDROCHLORIDE 50 MG: 50 TABLET, COATED ORAL at 06:13

## 2024-12-12 RX ADMIN — PALIPERIDONE 3 MG: 3 TABLET, EXTENDED RELEASE ORAL at 07:54

## 2024-12-12 RX ADMIN — TRAMADOL HYDROCHLORIDE 50 MG: 50 TABLET, COATED ORAL at 15:58

## 2024-12-12 RX ADMIN — ENOXAPARIN SODIUM 40 MG: 100 INJECTION SUBCUTANEOUS at 07:54

## 2024-12-12 RX ADMIN — Medication 5000 UNITS: at 07:54

## 2024-12-12 RX ADMIN — ALPRAZOLAM 0.5 MG: 0.25 TABLET ORAL at 22:38

## 2024-12-12 RX ADMIN — WATER 1000 MG: 1 INJECTION INTRAMUSCULAR; INTRAVENOUS; SUBCUTANEOUS at 06:14

## 2024-12-12 RX ADMIN — Medication 1 TABLET: at 07:55

## 2024-12-12 RX ADMIN — VORTIOXETINE 20 MG: 10 TABLET, FILM COATED ORAL at 07:54

## 2024-12-12 RX ADMIN — SODIUM CHLORIDE, PRESERVATIVE FREE 10 ML: 5 INJECTION INTRAVENOUS at 07:53

## 2024-12-12 RX ADMIN — SODIUM CHLORIDE, PRESERVATIVE FREE 10 ML: 5 INJECTION INTRAVENOUS at 22:38

## 2024-12-12 ASSESSMENT — PAIN - FUNCTIONAL ASSESSMENT: PAIN_FUNCTIONAL_ASSESSMENT: PREVENTS OR INTERFERES SOME ACTIVE ACTIVITIES AND ADLS

## 2024-12-12 ASSESSMENT — PAIN SCALES - GENERAL
PAINLEVEL_OUTOF10: 7
PAINLEVEL_OUTOF10: 7
PAINLEVEL_OUTOF10: 0

## 2024-12-12 ASSESSMENT — PAIN DESCRIPTION - DESCRIPTORS
DESCRIPTORS: ACHING
DESCRIPTORS: ACHING

## 2024-12-12 ASSESSMENT — PAIN DESCRIPTION - LOCATION
LOCATION: NECK
LOCATION: NECK

## 2024-12-12 ASSESSMENT — PAIN DESCRIPTION - ORIENTATION: ORIENTATION: MID

## 2024-12-12 ASSESSMENT — PAIN DESCRIPTION - ONSET: ONSET: GRADUAL

## 2024-12-12 NOTE — PROGRESS NOTES
INPATIENT CARDIOLOGY FOLLOW-UP    Name: Annmarie Zhu    Age: 74 y.o.    Date of Admission: 12/10/2024 11:59 PM    Date of Service: 12/12/2024    Chief Complaint: Follow-up for dizziness, orthostatic hypotension    Interim History:  Admitted for further evaluation of dizziness. Currently with no chest pain, respiratory distress, or palpitations. SR on EKG and telemetry. No new overnight cardiac complaints.    Review of Systems:   Cardiac: As per HPI  General: No fever, chills  Pulmonary: As per HPI  HEENT: No visual disturbances, difficult swallowing  GI: No nausea, vomiting  : No dysuria, hematuria  Endocrine: No thyroid disease or DM  Musculoskeletal: LOWRY x 4, no focal motor deficits  Skin: Intact, no rashes  Neuro: No headache, seizures  Psych: Currently with no depression, anxiety    Problem List:  Patient Active Problem List   Diagnosis    Fibromyalgia    IBS (irritable bowel syndrome)    Major depression    Anxiety and depression    Syncope    Orthostatic hypotension    Mixed hyperlipidemia    Anemia of chronic illness    Syncope and collapse    Anemia requiring transfusions    Pneumonia due to COVID-19 virus    Polypharmacy    Primary osteoarthritis of left hip    Dizziness    Elevated troponin     Reported history of Hypotension: PCP recently prescribed midodrine but she did not  the prescription due to cost.  Obesity: BMI 34.47 kg/m2  Reported history of dyslipidemia.  Patient denies.  History of syncope: Admitted 1/2017 --patient was having dizzy spells.  Orthostatic BP was negative.  Patient underwent a nonischemic Lexiscan MPS.  Patient was ordered an outpatient event monitor (not completed).  Patient has had no further episodes of syncope since 2017.  TTE: 5/22/2014: Mild AR, stage I diastolic dysfunction.  TTE: 1/2/2017 (Dr. Cruz): LVEF 55-65%, no pericardial effusion and no significant wall motion abnormality with normal diastolic function.  TTE: 10/6/2021 (Dr. Cody) LVEF 65% with  pharyngeal erythema, MMM, no rhinorrhea  Neck: Supple, no elevated JVP, no carotid bruits  Lungs: Clear to auscultation bilaterally. No wheezes, rales, or rhonchi.  Cardiac: Regular rate and rhythm, +S1S2, no murmurs apparent  Abdomen: Soft, nontender, +bowel sounds  Extremities: Moves all extremities x 4, no lower extremity edema  Neurologic: No focal motor deficits apparent, normal mood and affect  Peripheral Pulses: Intact posterior tibial pulses bilaterally    Intake/Output:    Intake/Output Summary (Last 24 hours) at 12/12/2024 1706  Last data filed at 12/12/2024 1223  Gross per 24 hour   Intake 485 ml   Output 1000 ml   Net -515 ml     I/O this shift:  In: 245 [P.O.:240; I.V.:5]  Out: -     Laboratory Tests:  Recent Labs     12/10/24  1238 12/11/24  0450 12/12/24  0630    139 142   K 4.6 3.9 3.8   * 111* 109*   CO2 24 20* 24   BUN 27* 16 13   CREATININE 0.7 0.6 0.6   GLUCOSE 96 93 90   CALCIUM 9.7 8.6 9.0     Lab Results   Component Value Date/Time    MG 2.1 12/10/2024 12:38 PM     Recent Labs     12/10/24  1238 12/11/24  0450 12/12/24  0630   ALKPHOS 93 80 87   ALT 14 12 16   AST 30 31 43*   BILITOT 0.4 0.4 0.3     Recent Labs     12/10/24  1238 12/11/24  0450 12/12/24  0630   WBC 3.3* 3.0* 2.4*   RBC 3.11* 2.63* 3.03*   HGB 10.4* 8.9* 10.2*   HCT 31.8* 28.1* 32.2*   .3* 106.8* 106.3*   MCH 33.4 33.8 33.7   MCHC 32.7 31.7* 31.7*   RDW 14.0 13.9 14.1   * 106* 107*   MPV 8.9 9.1 9.1     Lab Results   Component Value Date    TROPONINI <0.01 01/01/2017    TROPONINI <0.01 03/15/2014     Recent Labs     12/10/24  1238 12/10/24  1423 12/10/24  1600   TROPHS 55* 52* 52*     Lab Results   Component Value Date    INR 1.1 01/01/2017    PROTIME 11.9 01/01/2017     Lab Results   Component Value Date    TSH 0.66 12/11/2024    T4FREE 1.11 03/28/2014      No results found for: \"LABA1C\"  No results found for: \"EAG\"  Lab Results   Component Value Date    CHOL 190 12/12/2024    CHOL 205 (H) 11/12/2010

## 2024-12-12 NOTE — FLOWSHEET NOTE
12/12/24 0745   Vital Signs   Blood Pressure Lying 177/80   Pulse Lying 70 PER MINUTE   Blood Pressure Sitting 181/80   Pulse Sitting 79 PER MINUTE   Blood Pressure Standing 139/71   Pulse Standing 91 PER MINUTE     Alda Orozco CNP notified via [employer] regarding positive orthostats and increase in SBP.

## 2024-12-12 NOTE — PROGRESS NOTES
[facility] Quality Flow/Interdisciplinary Rounds Progress Note        Quality Flow Rounds held on December 12, 2024    Disciplines Attending:  Bedside Nurse, , , and Nursing Unit Leadership    Annmarie Zhu was admitted on 12/10/2024 11:59 PM    Anticipated Discharge Date:       Disposition:    Robby Score:  Robby Scale Score: 19    Readmission Risk              [facility] RISK OF UNPLANNED READMISSION:  0           Discussed patient goal for the day, patient clinical progression, and barriers to discharge.  The following Goal(s) of the Day/Commitment(s) have been identified:   discharge planning, PT/OT, IV fluids, orthos +, IV ABX, echo, cardio plan      Landon Chen RN  December 12, 2024

## 2024-12-12 NOTE — PROGRESS NOTES
[facility]                                Progress note    Subjective:  Denies chest pain and dyspnea.   Denies dizziness with change of position and with turning her head from side to side  States that when she lays flat, she feels dizzy and attributes this to prior neck surgery    Objective:    BP (!) 170/74   Pulse 68   Temp 97.3 °F (36.3 °C) (Oral)   Resp 18   Ht 1.524 m (5')   Wt 80.1 kg (176 lb 8 oz)   SpO2 97%   BMI 34.47 kg/m²     In: 245 [P.O.:240; I.V.:5]  Out: 1400   In: 245   Out: 1400 [Urine:1400]    General appearance: NAD, conversant  HEENT: AT/NC, MMM  Neck: FROM, supple  Lungs: Clear to auscultation  CV: RRR, no MRGs  Vasc: Radial pulses 2+  Abdomen: Soft, non-tender; no masses or HSM  Extremities: No peripheral edema or digital cyanosis  Skin: no rash, lesions or ulcers  Psych: Alert and oriented to person, place and time  Neuro: Alert and interactive     Recent Labs     12/10/24  1238 12/11/24  0450 12/12/24  0630   WBC 3.3* 3.0* 2.4*   HGB 10.4* 8.9* 10.2*   HCT 31.8* 28.1* 32.2*   * 106* 107*       Recent Labs     12/10/24  1238 12/11/24  0450    139   K 4.6 3.9   * 111*   CO2 24 20*   BUN 27* 16   CREATININE 0.7 0.6   CALCIUM 9.7 8.6     12/10/2024 CXR:  No acute process     12/10/2024 CT Head WO contrast:  No acute intracranial abnormality.     12/10/2024 CTA Head:  1. CT of the head shows no acute intracranial abnormality.   2.  CTA of the head shows no stenosis or occlusion of the proximal   intracranial arteries.   3. CTA of the neck shows no stenosis of the cervical carotid arteries using   NASCET criteria.   4. Mild, less than 50% stenosis of the origin of the dominant left vertebral   artery from calcified plaque. The rest of the left vertebral artery is widely   patent. The non dominant right vertebral artery is widely patent.      12/10/2024 CTA Neck:  1. CT of the head shows no acute intracranial abnormality.   2.  CTA of the head shows no  AM  12/12/2024

## 2024-12-13 LAB
ALBUMIN SERPL-MCNC: 3 G/DL (ref 3.5–5.2)
ALBUMIN SERPL-MCNC: 3.6 G/DL (ref 3.5–5.2)
ALP SERPL-CCNC: 80 U/L (ref 35–104)
ALP SERPL-CCNC: 91 U/L (ref 35–104)
ALT SERPL-CCNC: 17 U/L (ref 0–32)
ALT SERPL-CCNC: 18 U/L (ref 0–32)
ANION GAP SERPL CALCULATED.3IONS-SCNC: 11 MMOL/L (ref 7–16)
ANION GAP SERPL CALCULATED.3IONS-SCNC: 9 MMOL/L (ref 7–16)
AST SERPL-CCNC: 41 U/L (ref 0–31)
AST SERPL-CCNC: 51 U/L (ref 0–31)
BASOPHILS # BLD: 0.03 K/UL (ref 0–0.2)
BASOPHILS NFR BLD: 1 % (ref 0–2)
BILIRUB SERPL-MCNC: 0.3 MG/DL (ref 0–1.2)
BILIRUB SERPL-MCNC: 0.3 MG/DL (ref 0–1.2)
BUN SERPL-MCNC: 10 MG/DL (ref 6–23)
BUN SERPL-MCNC: 11 MG/DL (ref 6–23)
CALCIUM SERPL-MCNC: 8.8 MG/DL (ref 8.6–10.2)
CALCIUM SERPL-MCNC: 9 MG/DL (ref 8.6–10.2)
CHLORIDE SERPL-SCNC: 110 MMOL/L (ref 98–107)
CHLORIDE SERPL-SCNC: 110 MMOL/L (ref 98–107)
CO2 SERPL-SCNC: 19 MMOL/L (ref 22–29)
CO2 SERPL-SCNC: 22 MMOL/L (ref 22–29)
CREAT SERPL-MCNC: 0.5 MG/DL (ref 0.5–1)
CREAT SERPL-MCNC: 0.6 MG/DL (ref 0.5–1)
EOSINOPHIL # BLD: 0.08 K/UL (ref 0.05–0.5)
EOSINOPHILS RELATIVE PERCENT: 3 % (ref 0–6)
ERYTHROCYTE [DISTWIDTH] IN BLOOD BY AUTOMATED COUNT: 13.7 % (ref 11.5–15)
GFR, ESTIMATED: >90 ML/MIN/1.73M2
GFR, ESTIMATED: >90 ML/MIN/1.73M2
GLUCOSE SERPL-MCNC: 146 MG/DL (ref 74–99)
GLUCOSE SERPL-MCNC: 93 MG/DL (ref 74–99)
HCT VFR BLD AUTO: 31.7 % (ref 34–48)
HGB BLD-MCNC: 10.3 G/DL (ref 11.5–15.5)
IMM GRANULOCYTES # BLD AUTO: <0.03 K/UL (ref 0–0.58)
IMM GRANULOCYTES NFR BLD: 1 % (ref 0–5)
LYMPHOCYTES NFR BLD: 0.98 K/UL (ref 1.5–4)
LYMPHOCYTES RELATIVE PERCENT: 31 % (ref 20–42)
MCH RBC QN AUTO: 33.8 PG (ref 26–35)
MCHC RBC AUTO-ENTMCNC: 32.5 G/DL (ref 32–34.5)
MCV RBC AUTO: 103.9 FL (ref 80–99.9)
MONOCYTES NFR BLD: 0.29 K/UL (ref 0.1–0.95)
MONOCYTES NFR BLD: 9 % (ref 2–12)
NEUTROPHILS NFR BLD: 55 % (ref 43–80)
NEUTS SEG NFR BLD: 1.72 K/UL (ref 1.8–7.3)
PLATELET # BLD AUTO: 114 K/UL (ref 130–450)
PMV BLD AUTO: 9.1 FL (ref 7–12)
POTASSIUM SERPL-SCNC: 4.1 MMOL/L (ref 3.5–5)
POTASSIUM SERPL-SCNC: 4.7 MMOL/L (ref 3.5–5)
PROT SERPL-MCNC: 5.5 G/DL (ref 6.4–8.3)
PROT SERPL-MCNC: 6.2 G/DL (ref 6.4–8.3)
RBC # BLD AUTO: 3.05 M/UL (ref 3.5–5.5)
SODIUM SERPL-SCNC: 140 MMOL/L (ref 132–146)
SODIUM SERPL-SCNC: 141 MMOL/L (ref 132–146)
WBC OTHER # BLD: 3.1 K/UL (ref 4.5–11.5)

## 2024-12-13 PROCEDURE — 6370000000 HC RX 637 (ALT 250 FOR IP): Performed by: NURSE PRACTITIONER

## 2024-12-13 PROCEDURE — 36415 COLL VENOUS BLD VENIPUNCTURE: CPT

## 2024-12-13 PROCEDURE — 2580000003 HC RX 258: Performed by: NURSE PRACTITIONER

## 2024-12-13 PROCEDURE — 85025 COMPLETE CBC W/AUTO DIFF WBC: CPT

## 2024-12-13 PROCEDURE — 80053 COMPREHEN METABOLIC PANEL: CPT

## 2024-12-13 PROCEDURE — 2060000000 HC ICU INTERMEDIATE R&B

## 2024-12-13 PROCEDURE — 6360000002 HC RX W HCPCS: Performed by: NURSE PRACTITIONER

## 2024-12-13 RX ORDER — MECLIZINE HCL 12.5 MG 12.5 MG/1
12.5 TABLET ORAL 3 TIMES DAILY PRN
Status: DISCONTINUED | OUTPATIENT
Start: 2024-12-13 | End: 2024-12-14 | Stop reason: HOSPADM

## 2024-12-13 RX ADMIN — TRAMADOL HYDROCHLORIDE 50 MG: 50 TABLET, COATED ORAL at 21:12

## 2024-12-13 RX ADMIN — WATER 1000 MG: 1 INJECTION INTRAMUSCULAR; INTRAVENOUS; SUBCUTANEOUS at 06:20

## 2024-12-13 RX ADMIN — Medication 5000 UNITS: at 09:02

## 2024-12-13 RX ADMIN — Medication 50 MCG: at 09:02

## 2024-12-13 RX ADMIN — TRAMADOL HYDROCHLORIDE 50 MG: 50 TABLET, COATED ORAL at 09:09

## 2024-12-13 RX ADMIN — Medication 1 TABLET: at 09:02

## 2024-12-13 RX ADMIN — ALPRAZOLAM 0.5 MG: 0.25 TABLET ORAL at 14:14

## 2024-12-13 RX ADMIN — SODIUM CHLORIDE: 9 INJECTION, SOLUTION INTRAVENOUS at 14:18

## 2024-12-13 RX ADMIN — VORTIOXETINE 20 MG: 10 TABLET, FILM COATED ORAL at 09:02

## 2024-12-13 RX ADMIN — ENOXAPARIN SODIUM 40 MG: 100 INJECTION SUBCUTANEOUS at 09:01

## 2024-12-13 RX ADMIN — ALPRAZOLAM 0.5 MG: 0.25 TABLET ORAL at 23:17

## 2024-12-13 RX ADMIN — PALIPERIDONE 3 MG: 3 TABLET, EXTENDED RELEASE ORAL at 09:02

## 2024-12-13 RX ADMIN — DOCUSATE SODIUM 50 MG AND SENNOSIDES 8.6 MG 1 TABLET: 8.6; 5 TABLET, FILM COATED ORAL at 09:02

## 2024-12-13 RX ADMIN — ASPIRIN 81 MG: 81 TABLET, COATED ORAL at 09:02

## 2024-12-13 RX ADMIN — ALPRAZOLAM 0.5 MG: 0.25 TABLET ORAL at 09:01

## 2024-12-13 RX ADMIN — SODIUM CHLORIDE, PRESERVATIVE FREE 10 ML: 5 INJECTION INTRAVENOUS at 09:10

## 2024-12-13 ASSESSMENT — PAIN - FUNCTIONAL ASSESSMENT: PAIN_FUNCTIONAL_ASSESSMENT: ACTIVITIES ARE NOT PREVENTED

## 2024-12-13 ASSESSMENT — PAIN DESCRIPTION - DESCRIPTORS: DESCRIPTORS: ACHING

## 2024-12-13 ASSESSMENT — PAIN DESCRIPTION - LOCATION: LOCATION: NECK

## 2024-12-13 ASSESSMENT — PAIN SCALES - GENERAL
PAINLEVEL_OUTOF10: 0
PAINLEVEL_OUTOF10: 7
PAINLEVEL_OUTOF10: 0

## 2024-12-13 ASSESSMENT — PAIN DESCRIPTION - ONSET: ONSET: GRADUAL

## 2024-12-13 ASSESSMENT — PAIN DESCRIPTION - FREQUENCY: FREQUENCY: INTERMITTENT

## 2024-12-13 ASSESSMENT — PAIN DESCRIPTION - PAIN TYPE: TYPE: ACUTE PAIN;CHRONIC PAIN

## 2024-12-13 ASSESSMENT — PAIN DESCRIPTION - ORIENTATION: ORIENTATION: MID

## 2024-12-13 NOTE — PROGRESS NOTES
[facility] Quality Flow/Interdisciplinary Rounds Progress Note        Quality Flow Rounds held on December 13, 2024    Disciplines Attending:  Bedside Nurse, , , and Nursing Unit Leadership    Annmarie Zhu was admitted on 12/10/2024 11:59 PM    Anticipated Discharge Date:       Disposition:    Robby Score:  Robby Scale Score: 20    [facility] RISK OF UNPLANNED READMISSION 2.0             12.4 Total Score        Discussed patient goal for the day, patient clinical progression, and barriers to discharge.  The following Goal(s) of the Day/Commitment(s) have been identified:   discharge planning, IV fluids, echo       Landon Chen RN  December 13, 2024

## 2024-12-13 NOTE — DISCHARGE SUMMARY
[facility]                                Progress note    Subjective:  Denies chest pain and dyspnea.   Denies dizziness with change of position and with turning her head from side to side  States that when she lays flat, she feels dizzy and attributes this to prior neck surgery    Objective:    BP (!) 148/75   Pulse 76   Temp 97.5 °F (36.4 °C) (Infrared)   Resp 18   Ht 1.524 m (5')   Wt 84.4 kg (186 lb 1.1 oz)   SpO2 97%   BMI 36.34 kg/m²     In: 240 [P.O.:240]  Out: -   In: 240   Out: -     General appearance: NAD, conversant  HEENT: AT/NC, MMM  Neck: FROM, supple  Lungs: Clear to auscultation  CV: RRR, no MRGs  Vasc: Radial pulses 2+  Abdomen: Soft, non-tender; no masses or HSM  Extremities: No peripheral edema or digital cyanosis  Skin: no rash, lesions or ulcers  Psych: Alert and oriented to person, place and time  Neuro: Alert and interactive     Recent Labs     12/11/24  0450 12/12/24  0630 12/13/24  0858   WBC 3.0* 2.4* 3.1*   HGB 8.9* 10.2* 10.3*   HCT 28.1* 32.2* 31.7*   * 107* 114*       Recent Labs     12/12/24  0630 12/13/24  0314 12/13/24  0858    140 141   K 3.8 4.7 4.1   * 110* 110*   CO2 24 19* 22   BUN 13 11 10   CREATININE 0.6 0.6 0.5   CALCIUM 9.0 8.8 9.0     12/10/2024 CXR:  No acute process     12/10/2024 CT Head WO contrast:  No acute intracranial abnormality.     12/10/2024 CTA Head:  1. CT of the head shows no acute intracranial abnormality.   2.  CTA of the head shows no stenosis or occlusion of the proximal   intracranial arteries.   3. CTA of the neck shows no stenosis of the cervical carotid arteries using   NASCET criteria.   4. Mild, less than 50% stenosis of the origin of the dominant left vertebral   artery from calcified plaque. The rest of the left vertebral artery is widely   patent. The non dominant right vertebral artery is widely patent.      12/10/2024 CTA Neck:  1. CT of the head shows no acute intracranial abnormality.   2.  CTA

## 2024-12-13 NOTE — PROGRESS NOTES
Per nursing communication order, Joselyn Salcedo CNP notified via [employer] regarding discharge status and instrucitons.

## 2024-12-14 VITALS
HEART RATE: 72 BPM | WEIGHT: 186.07 LBS | RESPIRATION RATE: 18 BRPM | TEMPERATURE: 98 F | HEIGHT: 60 IN | OXYGEN SATURATION: 97 % | SYSTOLIC BLOOD PRESSURE: 141 MMHG | BODY MASS INDEX: 36.53 KG/M2 | DIASTOLIC BLOOD PRESSURE: 67 MMHG

## 2024-12-14 LAB
ALBUMIN SERPL-MCNC: 3.2 G/DL (ref 3.5–5.2)
ALP SERPL-CCNC: 75 U/L (ref 35–104)
ALT SERPL-CCNC: 15 U/L (ref 0–32)
ANION GAP SERPL CALCULATED.3IONS-SCNC: 9 MMOL/L (ref 7–16)
AST SERPL-CCNC: 30 U/L (ref 0–31)
BASOPHILS # BLD: 0.02 K/UL (ref 0–0.2)
BASOPHILS NFR BLD: 1 % (ref 0–2)
BILIRUB SERPL-MCNC: 0.3 MG/DL (ref 0–1.2)
BUN SERPL-MCNC: 15 MG/DL (ref 6–23)
CALCIUM SERPL-MCNC: 8.9 MG/DL (ref 8.6–10.2)
CHLORIDE SERPL-SCNC: 111 MMOL/L (ref 98–107)
CO2 SERPL-SCNC: 23 MMOL/L (ref 22–29)
CREAT SERPL-MCNC: 0.6 MG/DL (ref 0.5–1)
EOSINOPHIL # BLD: 0.14 K/UL (ref 0.05–0.5)
EOSINOPHILS RELATIVE PERCENT: 5 % (ref 0–6)
ERYTHROCYTE [DISTWIDTH] IN BLOOD BY AUTOMATED COUNT: 13.9 % (ref 11.5–15)
GFR, ESTIMATED: >90 ML/MIN/1.73M2
GLUCOSE SERPL-MCNC: 91 MG/DL (ref 74–99)
HCT VFR BLD AUTO: 27.1 % (ref 34–48)
HGB BLD-MCNC: 8.8 G/DL (ref 11.5–15.5)
IMM GRANULOCYTES # BLD AUTO: 0.03 K/UL (ref 0–0.58)
IMM GRANULOCYTES NFR BLD: 1 % (ref 0–5)
LYMPHOCYTES NFR BLD: 0.97 K/UL (ref 1.5–4)
LYMPHOCYTES RELATIVE PERCENT: 33 % (ref 20–42)
MCH RBC QN AUTO: 33.7 PG (ref 26–35)
MCHC RBC AUTO-ENTMCNC: 32.5 G/DL (ref 32–34.5)
MCV RBC AUTO: 103.8 FL (ref 80–99.9)
MONOCYTES NFR BLD: 0.43 K/UL (ref 0.1–0.95)
MONOCYTES NFR BLD: 15 % (ref 2–12)
NEUTROPHILS NFR BLD: 46 % (ref 43–80)
NEUTS SEG NFR BLD: 1.33 K/UL (ref 1.8–7.3)
PLATELET # BLD AUTO: 92 K/UL (ref 130–450)
PLATELET CONFIRMATION: NORMAL
PMV BLD AUTO: 8.9 FL (ref 7–12)
POTASSIUM SERPL-SCNC: 4.1 MMOL/L (ref 3.5–5)
PROT SERPL-MCNC: 5.1 G/DL (ref 6.4–8.3)
RBC # BLD AUTO: 2.61 M/UL (ref 3.5–5.5)
SODIUM SERPL-SCNC: 143 MMOL/L (ref 132–146)
WBC OTHER # BLD: 2.9 K/UL (ref 4.5–11.5)

## 2024-12-14 PROCEDURE — 6360000002 HC RX W HCPCS: Performed by: NURSE PRACTITIONER

## 2024-12-14 PROCEDURE — 6370000000 HC RX 637 (ALT 250 FOR IP): Performed by: NURSE PRACTITIONER

## 2024-12-14 PROCEDURE — 80053 COMPREHEN METABOLIC PANEL: CPT

## 2024-12-14 PROCEDURE — 85025 COMPLETE CBC W/AUTO DIFF WBC: CPT

## 2024-12-14 PROCEDURE — 2580000003 HC RX 258: Performed by: NURSE PRACTITIONER

## 2024-12-14 RX ORDER — MECLIZINE HCL 12.5 MG 12.5 MG/1
12.5 TABLET ORAL 3 TIMES DAILY
Qty: 30 TABLET | Refills: 0 | Status: SHIPPED | OUTPATIENT
Start: 2024-12-14 | End: 2024-12-24

## 2024-12-14 RX ADMIN — SODIUM CHLORIDE, PRESERVATIVE FREE 10 ML: 5 INJECTION INTRAVENOUS at 09:04

## 2024-12-14 RX ADMIN — Medication 1 TABLET: at 09:01

## 2024-12-14 RX ADMIN — Medication 50 MCG: at 09:01

## 2024-12-14 RX ADMIN — ENOXAPARIN SODIUM 40 MG: 100 INJECTION SUBCUTANEOUS at 09:05

## 2024-12-14 RX ADMIN — ALPRAZOLAM 0.5 MG: 0.25 TABLET ORAL at 09:00

## 2024-12-14 RX ADMIN — TRAMADOL HYDROCHLORIDE 50 MG: 50 TABLET, COATED ORAL at 09:04

## 2024-12-14 RX ADMIN — ASPIRIN 81 MG: 81 TABLET, COATED ORAL at 09:00

## 2024-12-14 RX ADMIN — ALPRAZOLAM 0.5 MG: 0.25 TABLET ORAL at 13:52

## 2024-12-14 RX ADMIN — Medication 5000 UNITS: at 08:59

## 2024-12-14 RX ADMIN — DOCUSATE SODIUM 50 MG AND SENNOSIDES 8.6 MG 1 TABLET: 8.6; 5 TABLET, FILM COATED ORAL at 09:01

## 2024-12-14 RX ADMIN — PALIPERIDONE 3 MG: 3 TABLET, EXTENDED RELEASE ORAL at 09:01

## 2024-12-14 RX ADMIN — VORTIOXETINE 20 MG: 10 TABLET, FILM COATED ORAL at 09:02

## 2024-12-14 ASSESSMENT — PAIN DESCRIPTION - ORIENTATION: ORIENTATION: MID

## 2024-12-14 ASSESSMENT — PAIN - FUNCTIONAL ASSESSMENT: PAIN_FUNCTIONAL_ASSESSMENT: PREVENTS OR INTERFERES SOME ACTIVE ACTIVITIES AND ADLS

## 2024-12-14 ASSESSMENT — PAIN SCALES - GENERAL: PAINLEVEL_OUTOF10: 7

## 2024-12-14 ASSESSMENT — PAIN DESCRIPTION - FREQUENCY: FREQUENCY: CONTINUOUS

## 2024-12-14 ASSESSMENT — PAIN DESCRIPTION - DESCRIPTORS: DESCRIPTORS: ACHING

## 2024-12-14 ASSESSMENT — PAIN DESCRIPTION - ONSET: ONSET: ON-GOING

## 2024-12-14 ASSESSMENT — PAIN DESCRIPTION - PAIN TYPE: TYPE: CHRONIC PAIN

## 2024-12-14 ASSESSMENT — PAIN DESCRIPTION - LOCATION: LOCATION: NECK

## 2024-12-14 NOTE — DISCHARGE SUMMARY
[facility]   Discharge summary   Patient ID:  Annmarie Zhu  [id number]  74 y.o.  1950    Admit date: 12/10/2024    Discharge date and time: 12/14/2024    Admission Diagnoses:   Patient Active Problem List   Diagnosis    Fibromyalgia    IBS (irritable bowel syndrome)    Major depression    Anxiety and depression    Syncope    Orthostatic hypotension    Mixed hyperlipidemia    Anemia of chronic illness    Syncope and collapse    Anemia requiring transfusions    Pneumonia due to COVID-19 virus    Polypharmacy    Primary osteoarthritis of left hip    Dizziness    Elevated troponin       Discharge Diagnoses: Orthostatic hypotension    Consults: cardiology    Procedures:     Hospital Course: The patient is a 74 y.o. female of Bartolome Orlando III, DO     Ms. Zhu is a 74 year old female with a medical history of HLD, Obesity, Anxiety with panic attacks, Depression, Arthritis, Cervical CA with radiation implant and hysterectomy in 1995, Dysrhythmia (specifics unclear), IBS, Kidney stones, and Syncope in 01/2017 with unremarkable Lexiscan MPS and TTE at that time.   Ms. Zhu presented to [place] ED on 12/10/2024 with complaints of dizziness. She states that prior to presentation, she was having worsening dizziness with change of position. The dizziness persisted to the point she could not ambulate. She became extremely nauseated and did not vomit. She denies accompanying fever, chills, chest pain, and dyspnea.   Upon arrival to the ED her VS were 190/75-97.9-71-%RA. Rapid Covid and Influenza screening was negative. UA+. WBC 3.3. H&H 10.4/31.8. Na 143. K 4.6. BUN/SCr 27/0.7. Troponin 55. EKG SR with LVH. CT of the head and CXR were unremarkable. She received Ultram, Antivert, NS 1 L infusion, and Xanax. During her ED course she became tachycardic with HR reportedly increased from 70s to 130s with standing despite hydration. She was symptomatic. She was transferred and admitted to [facility]  smaller in caliber. The dominant left vertebral artery has mild, less than 50% stenosis of its origin from calcified plaque.  The rest of the left vertebral artery is widely patent. The non dominant right vertebral artery is widely patent. SOFT TISSUES: The lung apices are clear.  No cervical or superior mediastinal lymphadenopathy.  The larynx and pharynx are unremarkable.  No acute abnormality of the salivary and thyroid glands. BONES: No acute osseous abnormality. CTA HEAD: ANTERIOR CIRCULATION: No significant stenosis of the intracranial internal carotid, anterior cerebral, or middle cerebral arteries. No aneurysm. The anterior communicating artery is patent. POSTERIOR CIRCULATION: No significant stenosis of the basilar or posterior cerebral arteries. No aneurysm. The left vertebral artery is dominant. The right vertebral artery is much smaller in caliber.  I cannot identify either of the posterior communicating arteries, a variant of normal. OTHER: No dural venous sinus thrombosis on this non-dedicated study. BRAIN: No mass effect or midline shift. No extra-axial fluid collection. The gray-white differentiation is maintained.     1. CT of the head shows no acute intracranial abnormality. 2.  CTA of the head shows no stenosis or occlusion of the proximal intracranial arteries. 3. CTA of the neck shows no stenosis of the cervical carotid arteries using NASCET criteria. 4. Mild, less than 50% stenosis of the origin of the dominant left vertebral artery from calcified plaque. The rest of the left vertebral artery is widely patent. The non dominant right vertebral artery is widely patent.     CT HEAD WO CONTRAST    Result Date: 12/10/2024  EXAMINATION: CT OF THE HEAD WITHOUT CONTRAST  12/10/2024 1:35 pm TECHNIQUE: CT of the head was performed without the administration of intravenous contrast. Automated exposure control, iterative reconstruction, and/or weight based adjustment of the mA/kV was utilized to reduce the

## 2024-12-17 ENCOUNTER — TELEPHONE (OUTPATIENT)
Dept: CARDIOLOGY CLINIC | Age: 74
End: 2024-12-17

## 2024-12-17 NOTE — TELEPHONE ENCOUNTER
Please advise regarding hospital f/u.  Also daughter states that bp has been elevated:     AM   PM     159/77 standing 166/83 standing  159/72 sitting  164/80 sitting    145/79 standing 165/83 standing  182/83 sitting  191/82 sitting    144/78 standing  171/77 sitting

## 2024-12-18 NOTE — PROGRESS NOTES
has Orthostatic hypotension due to dehydration.    Query created by: Colton Menendez on 12/13/2024 1:03 PM      QUERY TEXT:    Pt admitted with dizziness. Pt noted to have NT ProBNP 1,982 and   echocardiogram 12/12 \"EF by visual approximation is > 55%.\". If possible,   please document in progress notes and discharge summary if you are evaluating   and/or treating any of the following:    The medical record reflects the following:  Risk Factors: HTN  Clinical Indicators: Echocardiogram 12/12 \"...Left Ventricle: Normal left   ventricular systolic function.EF by visual approximation is > 55%. Left   ventricle size is normal. Proximal septal thickening. Stage 1 diastolic   dysfunction. Right Ventricle: Right ventricle size is normal. Normal systolic   function...\", NT ProBNP 1,982  Treatment: Labs, Echocardiogram, Cardiology Consult    Thank you,  Colton Menendez, YOGI, RN, CRCR  Clinical Documentation Integrity  [phone]  Options provided:  -- Chronic Diastolic CHF/HFpEF  -- Other - I will add my own diagnosis  -- Disagree - Not applicable / Not valid  -- Disagree - Clinically unable to determine / Unknown  -- Refer to Clinical Documentation Reviewer    PROVIDER RESPONSE TEXT:    This patient has chronic diastolic CHF/HFpEF.    Query created by: Colton Menendez on 12/13/2024 1:04 PM      Electronically signed by:  Maliha Neely MD 12/17/2024 9:21 PM

## 2024-12-20 RX ORDER — AMLODIPINE BESYLATE 5 MG/1
5 TABLET ORAL DAILY
Qty: 30 TABLET | Refills: 5 | Status: SHIPPED | OUTPATIENT
Start: 2024-12-20

## 2024-12-20 RX ORDER — AMLODIPINE BESYLATE 5 MG/1
5 TABLET ORAL DAILY
COMMUNITY
End: 2024-12-20 | Stop reason: SDUPTHER

## 2024-12-23 RX ORDER — AMLODIPINE BESYLATE 5 MG/1
5 TABLET ORAL DAILY
Qty: 90 TABLET | Refills: 1 | Status: SHIPPED | OUTPATIENT
Start: 2024-12-23

## 2025-01-10 PROBLEM — R79.89 ELEVATED TROPONIN: Status: RESOLVED | Noted: 2024-12-11 | Resolved: 2025-01-10

## 2025-02-05 ENCOUNTER — APPOINTMENT (OUTPATIENT)
Dept: GENERAL RADIOLOGY | Age: 75
End: 2025-02-05
Payer: MEDICARE

## 2025-02-05 ENCOUNTER — HOSPITAL ENCOUNTER (EMERGENCY)
Age: 75
Discharge: LEFT BEFORE DISCHARGE | End: 2025-02-05
Payer: MEDICARE

## 2025-02-05 VITALS
HEIGHT: 60 IN | TEMPERATURE: 98.4 F | SYSTOLIC BLOOD PRESSURE: 117 MMHG | DIASTOLIC BLOOD PRESSURE: 47 MMHG | BODY MASS INDEX: 31.02 KG/M2 | OXYGEN SATURATION: 97 % | WEIGHT: 158 LBS | RESPIRATION RATE: 16 BRPM

## 2025-02-05 PROCEDURE — 71046 X-RAY EXAM CHEST 2 VIEWS: CPT

## 2025-02-05 PROCEDURE — 99283 EMERGENCY DEPT VISIT LOW MDM: CPT

## 2025-02-05 RX ORDER — MECLIZINE HYDROCHLORIDE 25 MG/1
25 TABLET ORAL 3 TIMES DAILY PRN
COMMUNITY

## 2025-02-05 RX ORDER — CELECOXIB 100 MG/1
100 CAPSULE ORAL DAILY
COMMUNITY

## 2025-02-05 ASSESSMENT — PAIN - FUNCTIONAL ASSESSMENT: PAIN_FUNCTIONAL_ASSESSMENT: NONE - DENIES PAIN

## 2025-02-06 NOTE — ED TRIAGE NOTES
Department of Emergency Medicine  FIRST PROVIDER TRIAGE NOTE             Independent MLP           2/5/25  8:42 PM EST    Date of Encounter: 2/5/25   MRN: [id number]      HPI: Annmarie Zhu is a 75 y.o. female who presents to the ED for Cough (Cough, dizziness, hypotension, weakness for the past few days)       ROS: Negative for abd pain or vomiting.    PE: Gen Appearance/Constitutional: alert  Musculoskeletal: moves all extremities x 4     Initial Plan of Care: All treatment areas with department are currently occupied. Plan to order/Initiate the following while awaiting opening in ED: labs, EKG, and imaging studies.  Initiate Treatment-Testing, Proceed toTreatment Area When Bed Available for ED Attending/MLP to Continue Care.      Provider-Patient relationship only established for Provider In Triage (PIT) secondary to high volume, low staffing, and/or boarding- patient to await bed availability.  Full assessment, HPI and examination not performed.  Discussed with patient that the provider in triage evaluation is not a comprehensive medical screening exam and this is not yet possible at the end of the provider in triage encounter, to state whether or not an emergency medical condition exist.    Electronically signed by BETTY Medellin CNP   DD: 2/5/25

## (undated) DEVICE — FIBER LASER HOLM DISP SU200RT] LEONI FIBER OPTICS INC]

## (undated) DEVICE — GOWN,SIRUS,FABRNF,XL,20/CS: Brand: MEDLINE

## (undated) DEVICE — GLOVE SURG SZ 6 THK91MIL LTX FREE SYN POLYISOPRENE ANTI

## (undated) DEVICE — Z DUP USE 2139333 GUIDEWIRE UROLOGICAL STR STD 0.035 IN X150 CM REG ZIPWIRE LF

## (undated) DEVICE — 4-PORT MANIFOLD: Brand: NEPTUNE 2

## (undated) DEVICE — MEDIA CONTRAST RX ISOVUE-300 61% 30ML VIALS

## (undated) DEVICE — GUIDEWIRE URO L150CM DIA0.035IN TAPR 3CM NIT HYDRPHLC ANG

## (undated) DEVICE — TUBING, SUCTION, 9/32" X 10', STRAIGHT: Brand: MEDLINE

## (undated) DEVICE — GUIDEWIRE ENDOSCP L150CM DIA0.035IN TIP 3CM PTFE NIT

## (undated) DEVICE — 3M™ STERI-DRAPE™ U-DRAPE 1015: Brand: STERI-DRAPE™

## (undated) DEVICE — 3M™ TEGADERM™ TRANSPARENT FILM DRESSING FRAME STYLE, 1626W, 4 IN X 4-3/4 IN (10 CM X 12 CM), 50/CT 4CT/CASE: Brand: 3M™ TEGADERM™

## (undated) DEVICE — GLOVE SURG SZ 65 L12IN FNGR THK79MIL GRN LTX FREE

## (undated) DEVICE — DRESSING HYDROFIBER AQUACEL AG ADVANTAGE 3.5X10 IN

## (undated) DEVICE — ELECTRODE PT RET AD L9FT HI MOIST COND ADH HYDRGEL CORDED

## (undated) DEVICE — DRESSING,GAUZE,XEROFORM,CURAD,1"X8",ST: Brand: CURAD

## (undated) DEVICE — GLOVE ORANGE PI 7 1/2   MSG9075

## (undated) DEVICE — 3M™ IOBAN™ 2 ANTIMICROBIAL INCISE DRAPE 6651EZ: Brand: IOBAN™ 2

## (undated) DEVICE — NEEDLE,22GX1.5",REG,BEVEL: Brand: MEDLINE

## (undated) DEVICE — HANDPIECE SET WITH COAXIAL HIGH FLOW TIP AND SUCTION TUBE: Brand: INTERPULSE

## (undated) DEVICE — CYSTO PACK: Brand: MEDLINE INDUSTRIES, INC.

## (undated) DEVICE — APPLICATOR MEDICATED 26 CC SOLUTION HI LT ORNG CHLORAPREP

## (undated) DEVICE — KIT TRK HIP PROC VIZADISC

## (undated) DEVICE — Z INACTIVE USE 2635503 SOLUTION IRRIG 3000ML ST H2O USP UROMATIC PLAS CONT

## (undated) DEVICE — STRIP,CLOSURE,WOUND,MEDI-STRIP,1/2X4: Brand: MEDLINE

## (undated) DEVICE — SOLUTION IRRIG 3000ML 0.9% SOD CHL USP UROMATIC PLAS CONT

## (undated) DEVICE — MARKER RAD 3.5MM HEX CKPT STEREOTAXIC IMAG LESION LOC FOR

## (undated) DEVICE — 1000 S-DRAPE TOWEL DRAPE 10/BX: Brand: STERI-DRAPE™

## (undated) DEVICE — BLADE ES L6IN ELASTOMERIC COAT EXT DURABLE BEND UPTO 90DEG

## (undated) DEVICE — BAG DRNGE COMB PK

## (undated) DEVICE — ADAPTER URO SCP UROLOK LL

## (undated) DEVICE — TOTAL HIP PK

## (undated) DEVICE — 3M™ STERI-STRIP™ COMPOUND BENZOIN TINCTURE 40 BAGS/CARTON 4 CARTONS/CASE C1544: Brand: 3M™ STERI-STRIP™

## (undated) DEVICE — K-WIRE
Type: IMPLANTABLE DEVICE | Site: HIP | Status: NON-FUNCTIONAL
Brand: PRO
Removed: 2024-05-01

## (undated) DEVICE — DRAPE,REIN 53X77,STERILE: Brand: MEDLINE

## (undated) DEVICE — SPONGE LAP W18XL18IN WHT COT 4 PLY FLD STRUNG RADPQ DISP ST 2 PER PACK

## (undated) DEVICE — KIT DRP FOR RIO ROBOTIC ARM ASST SYS

## (undated) DEVICE — GLOVE SURG SZ 65 THK91MIL LTX FREE SYN POLYISOPRENE

## (undated) DEVICE — DILATOR/SHEATH SET: Brand: 8/10 DILATOR/SHEATH SET

## (undated) DEVICE — GLOVE ORTHO 8   MSG9480

## (undated) DEVICE — PEEL-AWAY HOOD: Brand: FLYTE, SURGICOOL

## (undated) DEVICE — GARMENT COMPR STD FOR 17IN CALF UNIF THER FLOTRN

## (undated) DEVICE — TOWEL,OR,DSP,ST,BLUE,STD,6/PK,12PK/CS: Brand: MEDLINE

## (undated) DEVICE — LIQUIBAND RAPID ADHESIVE 36/CS 0.8ML: Brand: MEDLINE

## (undated) DEVICE — BAG DRAINAGE CONTAINER 15 LT FLUID COLLCTN

## (undated) DEVICE — PACK PROCEDURE SURG GEN CUST

## (undated) DEVICE — GAUZE,SPONGE,4"X4",8PLY,STRL,LF,10/TRAY: Brand: MEDLINE

## (undated) DEVICE — SPECIMEN CUP W/LID: Brand: DEROYAL

## (undated) DEVICE — DOUBLE BASIN SET: Brand: MEDLINE INDUSTRIES, INC.

## (undated) DEVICE — TAPE ADH W3INXL10YD WHT COT WVN BK POWERFUL RUB BASE HIGHLY

## (undated) DEVICE — SUTURE STRATAFIX SYMMETRIC PDS + SZ 1 L18IN ABSRB VLT OS-6 SXPP1A201

## (undated) DEVICE — COIL STONE RETRV L115CM DIA7MM SHTH 3FR NIT URO STONE CONE

## (undated) DEVICE — Z INACTIVE USE 2660664 SOLUTION IRRIG 3000ML 0.9% SOD CHL USP UROMATIC PLAS CONT

## (undated) DEVICE — DRAPE,TOP,102X53,STERILE: Brand: MEDLINE

## (undated) DEVICE — SYRINGE MED 30ML STD CLR PLAS LUERLOCK TIP N CTRL DISP

## (undated) DEVICE — READY WET SKIN SCRUB TRAY-LF: Brand: MEDLINE INDUSTRIES, INC.

## (undated) DEVICE — CATHETER URET OLV TIP 5FRX70CM FLEXIMA

## (undated) DEVICE — GLOVE SURG SZ 8 CRM LTX FREE POLYISOPRENE POLYMER BEAD ANTI

## (undated) DEVICE — SOLUTION IV IRRIG WATER 1000ML POUR BRL 2F7114

## (undated) DEVICE — KIT SURG W7XL11IN 2 PKT UNTREATED NA

## (undated) DEVICE — SKN PREP SPNG STKS PVP PNT STR: Brand: MEDLINE INDUSTRIES, INC.

## (undated) DEVICE — CAMERA STRYKER 1488 HD GEN

## (undated) DEVICE — PIN BNE FIX TEMP L140MM DIA4MM MAKO

## (undated) DEVICE — 2108 SERIES SAGITTAL BLADE, OFFSET (20.0 X 0.89 X 80.0MM)